# Patient Record
Sex: FEMALE | Race: BLACK OR AFRICAN AMERICAN | Employment: FULL TIME | URBAN - METROPOLITAN AREA
[De-identification: names, ages, dates, MRNs, and addresses within clinical notes are randomized per-mention and may not be internally consistent; named-entity substitution may affect disease eponyms.]

---

## 2017-03-27 ENCOUNTER — OFFICE VISIT (OUTPATIENT)
Dept: PRIMARY CARE CLINIC | Age: 57
End: 2017-03-27

## 2017-03-27 VITALS
SYSTOLIC BLOOD PRESSURE: 132 MMHG | TEMPERATURE: 97.4 F | HEIGHT: 65 IN | WEIGHT: 245 LBS | DIASTOLIC BLOOD PRESSURE: 76 MMHG | RESPIRATION RATE: 17 BRPM | HEART RATE: 78 BPM | BODY MASS INDEX: 40.82 KG/M2

## 2017-03-27 DIAGNOSIS — L30.9 DERMATITIS: ICD-10-CM

## 2017-03-27 DIAGNOSIS — Z01.818 PRE-OP EXAM: ICD-10-CM

## 2017-03-27 DIAGNOSIS — M25.561 ACUTE PAIN OF RIGHT KNEE: Primary | ICD-10-CM

## 2017-03-27 PROBLEM — S83.249A ACUTE MEDIAL MENISCAL TEAR: Status: ACTIVE | Noted: 2017-03-21

## 2017-03-27 PROCEDURE — 99244 OFF/OP CNSLTJ NEW/EST MOD 40: CPT | Performed by: FAMILY MEDICINE

## 2017-03-27 PROCEDURE — 93000 ELECTROCARDIOGRAM COMPLETE: CPT | Performed by: FAMILY MEDICINE

## 2017-03-27 RX ORDER — TRIAMCINOLONE ACETONIDE 0.25 MG/G
OINTMENT TOPICAL
Qty: 15 G | Refills: 1 | Status: SHIPPED | OUTPATIENT
Start: 2017-03-27 | End: 2017-04-03

## 2017-03-27 ASSESSMENT — PATIENT HEALTH QUESTIONNAIRE - PHQ9
2. FEELING DOWN, DEPRESSED OR HOPELESS: 0
SUM OF ALL RESPONSES TO PHQ QUESTIONS 1-9: 0
SUM OF ALL RESPONSES TO PHQ9 QUESTIONS 1 & 2: 0
1. LITTLE INTEREST OR PLEASURE IN DOING THINGS: 0

## 2017-05-04 ENCOUNTER — TELEPHONE (OUTPATIENT)
Dept: OTHER | Facility: CLINIC | Age: 57
End: 2017-05-04

## 2017-06-07 ENCOUNTER — OFFICE VISIT (OUTPATIENT)
Dept: PRIMARY CARE CLINIC | Age: 57
End: 2017-06-07

## 2017-06-07 VITALS
DIASTOLIC BLOOD PRESSURE: 75 MMHG | HEART RATE: 76 BPM | TEMPERATURE: 97.8 F | SYSTOLIC BLOOD PRESSURE: 118 MMHG | HEIGHT: 65 IN | RESPIRATION RATE: 16 BRPM | WEIGHT: 255 LBS | OXYGEN SATURATION: 97 % | BODY MASS INDEX: 42.49 KG/M2

## 2017-06-07 DIAGNOSIS — T30.0 BURN: ICD-10-CM

## 2017-06-07 DIAGNOSIS — Z79.899 ON POTASSIUM WASTING DIURETIC THERAPY: ICD-10-CM

## 2017-06-07 DIAGNOSIS — R25.2 MUSCLE CRAMPS: ICD-10-CM

## 2017-06-07 DIAGNOSIS — R25.2 MUSCLE CRAMPS: Primary | ICD-10-CM

## 2017-06-07 DIAGNOSIS — Z86.32 HISTORY OF GESTATIONAL DIABETES: ICD-10-CM

## 2017-06-07 DIAGNOSIS — E55.9 VITAMIN D DEFICIENCY: ICD-10-CM

## 2017-06-07 DIAGNOSIS — O24.419 GESTATIONAL DIABETES MELLITUS (GDM), ANTEPARTUM, GESTATIONAL DIABETES METHOD OF CONTROL UNSPECIFIED: ICD-10-CM

## 2017-06-07 PROCEDURE — 99214 OFFICE O/P EST MOD 30 MIN: CPT | Performed by: INTERNAL MEDICINE

## 2017-06-07 RX ORDER — BACLOFEN 10 MG/1
TABLET ORAL
Qty: 60 TABLET | Refills: 1 | Status: SHIPPED | OUTPATIENT
Start: 2017-06-07 | End: 2018-01-09

## 2017-06-07 ASSESSMENT — ENCOUNTER SYMPTOMS
GASTROINTESTINAL NEGATIVE: 1
RESPIRATORY NEGATIVE: 1
CHANGE IN BOWEL HABIT: 0
ABDOMINAL PAIN: 0
VOMITING: 0
SORE THROAT: 0
VISUAL CHANGE: 0
NAUSEA: 0
EYES NEGATIVE: 1

## 2017-06-08 LAB
A/G RATIO: 1.3 (ref 1.1–2.2)
ALBUMIN SERPL-MCNC: 4.4 G/DL (ref 3.4–5)
ALP BLD-CCNC: 91 U/L (ref 40–129)
ALT SERPL-CCNC: 22 U/L (ref 10–40)
ANION GAP SERPL CALCULATED.3IONS-SCNC: 19 MMOL/L (ref 3–16)
AST SERPL-CCNC: 23 U/L (ref 15–37)
BASOPHILS ABSOLUTE: 0.1 K/UL (ref 0–0.2)
BASOPHILS RELATIVE PERCENT: 0.8 %
BILIRUB SERPL-MCNC: 0.4 MG/DL (ref 0–1)
BUN BLDV-MCNC: 21 MG/DL (ref 7–20)
CALCIUM SERPL-MCNC: 9.5 MG/DL (ref 8.3–10.6)
CHLORIDE BLD-SCNC: 97 MMOL/L (ref 99–110)
CO2: 24 MMOL/L (ref 21–32)
CREAT SERPL-MCNC: 0.9 MG/DL (ref 0.6–1.1)
EOSINOPHILS ABSOLUTE: 0.2 K/UL (ref 0–0.6)
EOSINOPHILS RELATIVE PERCENT: 3.1 %
ESTIMATED AVERAGE GLUCOSE: 125.5 MG/DL
GFR AFRICAN AMERICAN: >60
GFR NON-AFRICAN AMERICAN: >60
GLOBULIN: 3.5 G/DL
GLUCOSE BLD-MCNC: 80 MG/DL (ref 70–99)
HBA1C MFR BLD: 6 %
HCT VFR BLD CALC: 42.5 % (ref 36–48)
HEMOGLOBIN: 13.6 G/DL (ref 12–16)
LYMPHOCYTES ABSOLUTE: 1.7 K/UL (ref 1–5.1)
LYMPHOCYTES RELATIVE PERCENT: 25.5 %
MAGNESIUM: 2 MG/DL (ref 1.8–2.4)
MCH RBC QN AUTO: 28.1 PG (ref 26–34)
MCHC RBC AUTO-ENTMCNC: 32 G/DL (ref 31–36)
MCV RBC AUTO: 87.8 FL (ref 80–100)
MONOCYTES ABSOLUTE: 0.7 K/UL (ref 0–1.3)
MONOCYTES RELATIVE PERCENT: 9.8 %
NEUTROPHILS ABSOLUTE: 4 K/UL (ref 1.7–7.7)
NEUTROPHILS RELATIVE PERCENT: 60.8 %
PDW BLD-RTO: 14.3 % (ref 12.4–15.4)
PLATELET # BLD: 230 K/UL (ref 135–450)
PMV BLD AUTO: 9.6 FL (ref 5–10.5)
POTASSIUM SERPL-SCNC: 4 MMOL/L (ref 3.5–5.1)
RBC # BLD: 4.84 M/UL (ref 4–5.2)
SODIUM BLD-SCNC: 140 MMOL/L (ref 136–145)
TOTAL PROTEIN: 7.9 G/DL (ref 6.4–8.2)
WBC # BLD: 6.6 K/UL (ref 4–11)

## 2017-06-10 LAB
VITAMIN D2 AND D3, TOTAL: 41.3 NG/ML (ref 30–80)
VITAMIN D2, 25 HYDROXY: <1 NG/ML
VITAMIN D3,25 HYDROXY: 41.3 NG/ML

## 2017-08-30 DIAGNOSIS — I10 ESSENTIAL HYPERTENSION: ICD-10-CM

## 2017-08-31 RX ORDER — LOSARTAN POTASSIUM AND HYDROCHLOROTHIAZIDE 25; 100 MG/1; MG/1
1 TABLET ORAL DAILY
Qty: 30 TABLET | Refills: 5 | OUTPATIENT
Start: 2017-08-31

## 2017-09-01 ENCOUNTER — OFFICE VISIT (OUTPATIENT)
Dept: PRIMARY CARE CLINIC | Age: 57
End: 2017-09-01

## 2017-09-01 VITALS
HEIGHT: 65 IN | RESPIRATION RATE: 18 BRPM | SYSTOLIC BLOOD PRESSURE: 135 MMHG | DIASTOLIC BLOOD PRESSURE: 81 MMHG | HEART RATE: 67 BPM | TEMPERATURE: 98.8 F | BODY MASS INDEX: 41.15 KG/M2 | WEIGHT: 247 LBS

## 2017-09-01 DIAGNOSIS — E55.9 VITAMIN D DEFICIENCY: ICD-10-CM

## 2017-09-01 DIAGNOSIS — Z12.11 SCREENING FOR COLON CANCER: ICD-10-CM

## 2017-09-01 DIAGNOSIS — R05.9 COUGH: ICD-10-CM

## 2017-09-01 DIAGNOSIS — K21.9 GASTROESOPHAGEAL REFLUX DISEASE WITHOUT ESOPHAGITIS: ICD-10-CM

## 2017-09-01 DIAGNOSIS — I10 ESSENTIAL HYPERTENSION: Primary | ICD-10-CM

## 2017-09-01 DIAGNOSIS — E66.01 MORBID OBESITY DUE TO EXCESS CALORIES (HCC): ICD-10-CM

## 2017-09-01 DIAGNOSIS — L85.3 XEROSIS CUTIS: ICD-10-CM

## 2017-09-01 PROCEDURE — 99214 OFFICE O/P EST MOD 30 MIN: CPT | Performed by: FAMILY MEDICINE

## 2017-09-01 RX ORDER — AMMONIUM LACTATE 12 G/100G
LOTION TOPICAL
Qty: 1 BOTTLE | Refills: 5 | Status: SHIPPED | OUTPATIENT
Start: 2017-09-01 | End: 2018-03-08 | Stop reason: SDUPTHER

## 2017-09-01 RX ORDER — LOSARTAN POTASSIUM AND HYDROCHLOROTHIAZIDE 25; 100 MG/1; MG/1
1 TABLET ORAL DAILY
Qty: 30 TABLET | Refills: 5 | Status: SHIPPED | OUTPATIENT
Start: 2017-09-01 | End: 2018-03-08 | Stop reason: SDUPTHER

## 2017-09-01 ASSESSMENT — ENCOUNTER SYMPTOMS: COUGH: 1

## 2017-10-31 ENCOUNTER — TELEPHONE (OUTPATIENT)
Dept: OTHER | Facility: CLINIC | Age: 57
End: 2017-10-31

## 2017-12-15 ENCOUNTER — TELEPHONE (OUTPATIENT)
Dept: PRIMARY CARE CLINIC | Age: 57
End: 2017-12-15

## 2017-12-18 ENCOUNTER — OFFICE VISIT (OUTPATIENT)
Dept: PRIMARY CARE CLINIC | Age: 57
End: 2017-12-18

## 2017-12-18 VITALS
HEIGHT: 63 IN | WEIGHT: 249 LBS | TEMPERATURE: 98.8 F | DIASTOLIC BLOOD PRESSURE: 94 MMHG | HEART RATE: 70 BPM | SYSTOLIC BLOOD PRESSURE: 166 MMHG | BODY MASS INDEX: 44.12 KG/M2 | OXYGEN SATURATION: 97 %

## 2017-12-18 DIAGNOSIS — Z23 NEEDS FLU SHOT: ICD-10-CM

## 2017-12-18 DIAGNOSIS — I10 ESSENTIAL HYPERTENSION: ICD-10-CM

## 2017-12-18 DIAGNOSIS — Z12.4 CERVICAL CANCER SCREENING: ICD-10-CM

## 2017-12-18 DIAGNOSIS — Z00.00 ANNUAL PHYSICAL EXAM: Primary | ICD-10-CM

## 2017-12-18 DIAGNOSIS — Z01.00 EYE EXAM, ROUTINE: ICD-10-CM

## 2017-12-18 DIAGNOSIS — Z12.39 BREAST CANCER SCREENING: ICD-10-CM

## 2017-12-18 DIAGNOSIS — Z13.220 LIPID SCREENING: ICD-10-CM

## 2017-12-18 DIAGNOSIS — Z12.11 COLON CANCER SCREENING: ICD-10-CM

## 2017-12-18 PROCEDURE — 99396 PREV VISIT EST AGE 40-64: CPT | Performed by: INTERNAL MEDICINE

## 2017-12-18 PROCEDURE — 90471 IMMUNIZATION ADMIN: CPT | Performed by: INTERNAL MEDICINE

## 2017-12-18 PROCEDURE — 90630 INFLUENZA, QUADV, 18-64 YRS, ID, PF, MICRO INJ, 0.1ML (FLUZONE QUADV, PF): CPT | Performed by: INTERNAL MEDICINE

## 2017-12-18 ASSESSMENT — ENCOUNTER SYMPTOMS
ABDOMINAL PAIN: 0
SHORTNESS OF BREATH: 0
COUGH: 0
VOMITING: 0
CONSTIPATION: 0
NAUSEA: 0
DIARRHEA: 0

## 2017-12-18 NOTE — PROGRESS NOTES
Chief Complaint:   Chief Complaint   Patient presents with    Annual Exam         HPI:  Janice Curtis is a 56 y.o. female, with a history of GERD, hypertension, and morbid obesity, who presents for a physical examination. Hypertension:  Pt's blood pressure is elevated today. Her high blood pressure appears to be inconsistent with past visits. The pt attributes her high blood pressures today to job related stress and other issues. She says she has been compliant with her medications. The pt says she avoids salty foods. She denies any current headaches or chest pain. Job Physical:  Ms. Celeste Uribe needs to have wellness paperwork completed for her job insurance plan. She needs cholesterol levels check for form completion. Preventative Health Screenings:   Colonoscopy:  Needs, has never completed   Cervical cancer screening: needs updated   Mammogram:  Needs updated   Eye examination: needs updated     Past Medical History:   Diagnosis Date    GERD (gastroesophageal reflux disease)     Gestational diabetes     Hypertension     Morbid obesity due to excess calories ([facility] [address] 2016    Vitamin D deficiency     Xerosis cutis        Past Surgical History:   Procedure Laterality Date     SECTION            No family history on file. Allergies   Allergen Reactions    Chloroquine     Lisinopril      Coughing and choking    Mefloquine     Quinolones        Social History     Social History    Marital status: Unknown     Spouse name: N/A    Number of children: N/A    Years of education: N/A     Occupational History    Not on file.      Social History Main Topics    Smoking status: Never Smoker    Smokeless tobacco: Never Used    Alcohol use No    Drug use: No    Sexual activity: Yes     Partners: Male     Other Topics Concern    Not on file     Social History Narrative    No narrative on file       Current Outpatient Prescriptions   Medication Sig Dispense Refill    ammonium lactate (LAC-HYDRIN) 12 % lotion Apply topically daily. 1 Bottle 5    losartan-hydrochlorothiazide (HYZAAR) 100-25 MG per tablet Take 1 tablet by mouth daily 30 tablet 5    silver sulfADIAZINE (SILVADENE) 1 % cream Apply topically daily. 100 g 1    baclofen (LIORESAL) 10 MG tablet One tablet twice a day as needed for muscle cramps. May cause drowsiness. 60 tablet 1    omeprazole 20 MG EC tablet Take 1 tablet by mouth daily 30 tablet 5    L-methylfolate-B6-B12 (METANX) 3-35-2 MG tablet Take 1 tablet by mouth daily 60 tablet 5    Cholecalciferol (MAXIMUM D3) 64905 UNITS CAPS Take  by mouth.  naproxen sodium (ANAPROX DS) 550 MG tablet Take 1 tablet by mouth 2 times daily (with meals) for 14 doses. 14 tablet 0     No current facility-administered medications for this visit. Review of Systems   Constitutional: Negative for chills, fatigue and fever. HENT: Negative for congestion. Eyes: Negative for visual disturbance. Respiratory: Negative for cough and shortness of breath. Cardiovascular: Negative for chest pain and leg swelling. Gastrointestinal: Negative for abdominal pain, constipation, diarrhea, nausea and vomiting. Musculoskeletal: Negative for gait problem. Neurological: Negative for dizziness, light-headedness and headaches. Psychiatric/Behavioral: Negative for decreased concentration and dysphoric mood. The patient is not nervous/anxious. BP (!) 166/94   Pulse 70   Temp 98.8 °F (37.1 °C)   Ht 5' 3\" (1.6 m)   Wt 249 lb (112.9 kg)   LMP 10/10/2014   SpO2 97%   BMI 44.11 kg/m²     Physical Exam   Constitutional: She is oriented to person, place, and time. She appears well-developed and well-nourished. No distress. HENT:   Head: Normocephalic and atraumatic. Nose: Nose normal.   Mouth/Throat: No oropharyngeal exudate. Eyes: Conjunctivae and EOM are normal. Pupils are equal, round, and reactive to light. Right eye exhibits no discharge.  Left eye exhibits no discharge. Neck: Normal range of motion. Neck supple. Cardiovascular: Normal rate, regular rhythm and normal heart sounds. Exam reveals no gallop and no friction rub. No murmur heard. Pulmonary/Chest: Effort normal and breath sounds normal. No respiratory distress. She has no wheezes. Abdominal: Soft. Bowel sounds are normal. She exhibits no distension. There is no tenderness. There is no rebound. Musculoskeletal: Normal range of motion. She exhibits no edema or tenderness. Lymphadenopathy:     She has no cervical adenopathy. Neurological: She is alert and oriented to person, place, and time. She has normal reflexes. No cranial nerve deficit. Skin: Skin is warm and dry. No rash noted. She is not diaphoretic. No erythema. Psychiatric: She has a normal mood and affect. Her behavior is normal.   Vitals reviewed. Assessment:  Benjamin Nuno is a 56 y.o. female, with a history of GERD, hypertension, and morbid obesity, who presents for a physical examination. Plan:       1. Annual physical exam  -will complete job paperwork after pt completes lipid panel testing     2. Essential hypertension:  Goal BP less than 140/90. Pt's blood pressure is high today. She attributes this to current stressors, but has been compliant with her medication. Based on past BP measurements, this does appear to be an outlier.    -continue current regimen:  HCTZ 25 mg Qday and Losartan 100 mg Qday  -return in 1 month for blood pressure recheck to see if additional antihypertensive medications are needed. -recommend low sodium diet and exercise changes     3. Lipid screening    - Lipid Panel; Future    4. Needs flu shot    -completed at today's visit:  Ramonita Rodriguez, 18-64 YRS, ID, PF, MICRO INJ, 0.1ML (FLUZONE QUADV, PF)    5. Eye exam, routine    - [facility] [place],  ([place])    6. Breast cancer screening    - Mammography Screening    7.  Colon cancer screening    - [facility] Karissa Mesa MD ([place])    8. Cervical cancer screening    - Payton Yost MD        Return in about 1 month (around 1/18/2018) for hypertension with Dr. Pasquale Glass .

## 2017-12-21 DIAGNOSIS — Z13.220 LIPID SCREENING: ICD-10-CM

## 2017-12-21 LAB
CHOLESTEROL, TOTAL: 180 MG/DL (ref 0–199)
HDLC SERPL-MCNC: 56 MG/DL (ref 40–60)
LDL CHOLESTEROL CALCULATED: 110 MG/DL
TRIGL SERPL-MCNC: 72 MG/DL (ref 0–150)
VLDLC SERPL CALC-MCNC: 14 MG/DL

## 2017-12-24 DIAGNOSIS — E78.00 PURE HYPERCHOLESTEROLEMIA: Primary | ICD-10-CM

## 2017-12-24 RX ORDER — ATORVASTATIN CALCIUM 20 MG/1
20 TABLET, FILM COATED ORAL DAILY
Qty: 30 TABLET | Refills: 3 | Status: SHIPPED | OUTPATIENT
Start: 2017-12-24 | End: 2018-03-08

## 2018-01-09 ENCOUNTER — OFFICE VISIT (OUTPATIENT)
Dept: PRIMARY CARE CLINIC | Age: 58
End: 2018-01-09

## 2018-01-09 VITALS
TEMPERATURE: 97.2 F | WEIGHT: 248 LBS | OXYGEN SATURATION: 98 % | BODY MASS INDEX: 43.94 KG/M2 | DIASTOLIC BLOOD PRESSURE: 82 MMHG | HEART RATE: 78 BPM | HEIGHT: 63 IN | SYSTOLIC BLOOD PRESSURE: 134 MMHG

## 2018-01-09 DIAGNOSIS — Z01.810 PREOPERATIVE CARDIOVASCULAR EXAMINATION: ICD-10-CM

## 2018-01-09 DIAGNOSIS — Z01.810 PREOPERATIVE CARDIOVASCULAR EXAMINATION: Primary | ICD-10-CM

## 2018-01-09 LAB
ALBUMIN SERPL-MCNC: 4.3 G/DL (ref 3.4–5)
ANION GAP SERPL CALCULATED.3IONS-SCNC: 15 MMOL/L (ref 3–16)
BASOPHILS ABSOLUTE: 0 K/UL (ref 0–0.2)
BASOPHILS RELATIVE PERCENT: 0.6 %
BUN BLDV-MCNC: 12 MG/DL (ref 7–20)
CALCIUM SERPL-MCNC: 9.3 MG/DL (ref 8.3–10.6)
CHLORIDE BLD-SCNC: 100 MMOL/L (ref 99–110)
CO2: 26 MMOL/L (ref 21–32)
CREAT SERPL-MCNC: 0.7 MG/DL (ref 0.6–1.1)
EOSINOPHILS ABSOLUTE: 0.1 K/UL (ref 0–0.6)
EOSINOPHILS RELATIVE PERCENT: 1.9 %
GFR AFRICAN AMERICAN: >60
GFR NON-AFRICAN AMERICAN: >60
GLUCOSE BLD-MCNC: 75 MG/DL (ref 70–99)
HCT VFR BLD CALC: 39.1 % (ref 36–48)
HEMOGLOBIN: 13 G/DL (ref 12–16)
LYMPHOCYTES ABSOLUTE: 2.1 K/UL (ref 1–5.1)
LYMPHOCYTES RELATIVE PERCENT: 32.1 %
MCH RBC QN AUTO: 28.7 PG (ref 26–34)
MCHC RBC AUTO-ENTMCNC: 33.1 G/DL (ref 31–36)
MCV RBC AUTO: 86.6 FL (ref 80–100)
MONOCYTES ABSOLUTE: 0.6 K/UL (ref 0–1.3)
MONOCYTES RELATIVE PERCENT: 9.5 %
NEUTROPHILS ABSOLUTE: 3.7 K/UL (ref 1.7–7.7)
NEUTROPHILS RELATIVE PERCENT: 55.9 %
PDW BLD-RTO: 13.6 % (ref 12.4–15.4)
PHOSPHORUS: 3.6 MG/DL (ref 2.5–4.9)
PLATELET # BLD: 261 K/UL (ref 135–450)
PMV BLD AUTO: 9.1 FL (ref 5–10.5)
POTASSIUM SERPL-SCNC: 4.2 MMOL/L (ref 3.5–5.1)
RBC # BLD: 4.52 M/UL (ref 4–5.2)
SODIUM BLD-SCNC: 141 MMOL/L (ref 136–145)
TROPONIN: <0.01 NG/ML
WBC # BLD: 6.6 K/UL (ref 4–11)

## 2018-01-09 PROCEDURE — 99243 OFF/OP CNSLTJ NEW/EST LOW 30: CPT | Performed by: INTERNAL MEDICINE

## 2018-01-09 PROCEDURE — 93000 ELECTROCARDIOGRAM COMPLETE: CPT | Performed by: INTERNAL MEDICINE

## 2018-01-09 NOTE — PROGRESS NOTES
person, place, and time. No cranial nerve deficit. Skin: Skin is warm and dry. No rash noted. She is not diaphoretic. No erythema. Psychiatric: She has a normal mood and affect. Her behavior is normal.   Vitals reviewed. EKG Interpretation:  Sinus rhythm, mild ST elevation in lateral leads I and AvL , there are no previous tracings available for comparison. Pt denies chest pain or shortness of breath. Lab Results   Component Value Date    WBC 6.6 06/07/2017    HGB 13.6 06/07/2017    HCT 42.5 06/07/2017    MCV 87.8 06/07/2017     06/07/2017     Lab Results   Component Value Date    CREATININE 0.9 06/07/2017    BUN 21 (H) 06/07/2017     06/07/2017    K 4.0 06/07/2017    CL 97 (L) 06/07/2017    CO2 24 06/07/2017     No results found for: INR, PROTIME         Assessment:       56 y.o. patient with planned surgery as above. Known risk factors for perioperative complications: None  Current medications which may produce withdrawal symptoms if withheld perioperatively: none      Plan:     1. Preoperative workup as follows: ECG, hemoglobin, hematocrit, electrolytes, creatinine, glucose  2. Change in medication regimen before surgery: Discontinue NSAIDs 5 days before surgery, hold losartan morning of surgery   3. Prophylaxis for cardiac events with perioperative beta-blockers: Not indicated  ACC/AHA indications for pre-operative beta-blocker use:    · Vascular surgery with history of postitive stress test  · Intermediate or high risk surgery with history of CAD   · Intermediate or high risk surgery with multiple clinical predictors of CAD- 2 of the following: history of compensated or prior heart failure, history of cerebrovascular disease, DM, or renal insufficiency    Routine administration of higher-dose, long-acting metoprolol in beta-blockernaïve patients on the day of surgery, and in the absence of dose titration is associated with an overall increase in mortality.   Beta-blockers should be started days to weeks prior to surgery and titrated to pulse < 70.  4. Deep vein thrombosis prophylaxis: regimen to be chosen by surgical team  5.  No contraindications to planned surgery

## 2018-01-10 ASSESSMENT — ENCOUNTER SYMPTOMS
COUGH: 0
SHORTNESS OF BREATH: 0
SORE THROAT: 0
ABDOMINAL PAIN: 0

## 2018-03-08 ENCOUNTER — OFFICE VISIT (OUTPATIENT)
Dept: PRIMARY CARE CLINIC | Age: 58
End: 2018-03-08

## 2018-03-08 VITALS
HEART RATE: 68 BPM | DIASTOLIC BLOOD PRESSURE: 85 MMHG | RESPIRATION RATE: 12 BRPM | HEIGHT: 63 IN | SYSTOLIC BLOOD PRESSURE: 130 MMHG | TEMPERATURE: 97.7 F | WEIGHT: 241 LBS | BODY MASS INDEX: 42.7 KG/M2 | OXYGEN SATURATION: 93 %

## 2018-03-08 DIAGNOSIS — I10 ESSENTIAL HYPERTENSION: Primary | ICD-10-CM

## 2018-03-08 DIAGNOSIS — E66.01 MORBID OBESITY DUE TO EXCESS CALORIES (HCC): ICD-10-CM

## 2018-03-08 DIAGNOSIS — S86.001S INJURY OF RIGHT ACHILLES TENDON, SEQUELA: ICD-10-CM

## 2018-03-08 DIAGNOSIS — L85.3 XEROSIS CUTIS: ICD-10-CM

## 2018-03-08 DIAGNOSIS — E55.9 VITAMIN D DEFICIENCY: ICD-10-CM

## 2018-03-08 DIAGNOSIS — K21.9 GASTROESOPHAGEAL REFLUX DISEASE WITHOUT ESOPHAGITIS: ICD-10-CM

## 2018-03-08 PROCEDURE — 99214 OFFICE O/P EST MOD 30 MIN: CPT | Performed by: FAMILY MEDICINE

## 2018-03-08 RX ORDER — LOSARTAN POTASSIUM AND HYDROCHLOROTHIAZIDE 25; 100 MG/1; MG/1
1 TABLET ORAL DAILY
Qty: 30 TABLET | Refills: 5 | Status: SHIPPED | OUTPATIENT
Start: 2018-03-08 | End: 2018-09-27 | Stop reason: SDUPTHER

## 2018-03-08 RX ORDER — DICLOFENAC SODIUM 75 MG/1
75 TABLET, DELAYED RELEASE ORAL
COMMUNITY
Start: 2018-02-07 | End: 2019-03-05

## 2018-03-08 RX ORDER — AMMONIUM LACTATE 12 G/100G
LOTION TOPICAL
Qty: 1 BOTTLE | Refills: 5 | Status: SHIPPED | OUTPATIENT
Start: 2018-03-08 | End: 2018-12-06

## 2018-04-19 ENCOUNTER — OFFICE VISIT (OUTPATIENT)
Dept: PRIMARY CARE CLINIC | Age: 58
End: 2018-04-19

## 2018-04-19 VITALS
OXYGEN SATURATION: 97 % | DIASTOLIC BLOOD PRESSURE: 81 MMHG | HEIGHT: 63 IN | TEMPERATURE: 98.3 F | SYSTOLIC BLOOD PRESSURE: 127 MMHG | BODY MASS INDEX: 42.1 KG/M2 | HEART RATE: 66 BPM | RESPIRATION RATE: 12 BRPM | WEIGHT: 237.6 LBS

## 2018-04-19 DIAGNOSIS — M25.561 CHRONIC PAIN OF RIGHT KNEE: Primary | ICD-10-CM

## 2018-04-19 DIAGNOSIS — G89.29 CHRONIC PAIN OF RIGHT KNEE: Primary | ICD-10-CM

## 2018-04-19 DIAGNOSIS — Z01.818 PRE-OPERATIVE GENERAL PHYSICAL EXAMINATION: ICD-10-CM

## 2018-04-19 PROCEDURE — 99243 OFF/OP CNSLTJ NEW/EST LOW 30: CPT | Performed by: FAMILY MEDICINE

## 2018-04-19 ASSESSMENT — PATIENT HEALTH QUESTIONNAIRE - PHQ9
SUM OF ALL RESPONSES TO PHQ QUESTIONS 1-9: 0
2. FEELING DOWN, DEPRESSED OR HOPELESS: 0
SUM OF ALL RESPONSES TO PHQ9 QUESTIONS 1 & 2: 0
1. LITTLE INTEREST OR PLEASURE IN DOING THINGS: 0

## 2018-09-27 ENCOUNTER — OFFICE VISIT (OUTPATIENT)
Dept: PRIMARY CARE CLINIC | Age: 58
End: 2018-09-27
Payer: COMMERCIAL

## 2018-09-27 VITALS
RESPIRATION RATE: 16 BRPM | BODY MASS INDEX: 42.56 KG/M2 | TEMPERATURE: 97.1 F | SYSTOLIC BLOOD PRESSURE: 152 MMHG | WEIGHT: 240.2 LBS | HEART RATE: 70 BPM | OXYGEN SATURATION: 100 % | DIASTOLIC BLOOD PRESSURE: 94 MMHG | HEIGHT: 63 IN

## 2018-09-27 DIAGNOSIS — K21.9 GASTROESOPHAGEAL REFLUX DISEASE WITHOUT ESOPHAGITIS: ICD-10-CM

## 2018-09-27 DIAGNOSIS — H92.02 OTALGIA OF LEFT EAR: Primary | ICD-10-CM

## 2018-09-27 DIAGNOSIS — I10 ESSENTIAL HYPERTENSION: ICD-10-CM

## 2018-09-27 PROCEDURE — 99213 OFFICE O/P EST LOW 20 MIN: CPT | Performed by: FAMILY MEDICINE

## 2018-09-27 RX ORDER — LOSARTAN POTASSIUM AND HYDROCHLOROTHIAZIDE 25; 100 MG/1; MG/1
1 TABLET ORAL DAILY
Qty: 30 TABLET | Refills: 0 | Status: SHIPPED | OUTPATIENT
Start: 2018-09-27 | End: 2018-10-25 | Stop reason: SDUPTHER

## 2018-09-27 RX ORDER — ESOMEPRAZOLE MAGNESIUM 40 MG/1
40 CAPSULE, DELAYED RELEASE ORAL DAILY
Qty: 30 CAPSULE | Refills: 2 | Status: SHIPPED | OUTPATIENT
Start: 2018-09-27 | End: 2018-10-25 | Stop reason: CLARIF

## 2018-09-27 RX ORDER — GUAIFENESIN 600 MG/1
600 TABLET, EXTENDED RELEASE ORAL 2 TIMES DAILY
Qty: 60 TABLET | Refills: 0 | Status: SHIPPED | OUTPATIENT
Start: 2018-09-27 | End: 2018-12-06 | Stop reason: SDUPTHER

## 2018-09-27 NOTE — PATIENT INSTRUCTIONS
Patient Education        Earache: Care Instructions  Your Care Instructions    Even though infection is a common cause of ear pain, not all ear pain means an infection. If you have ear pain and don't have an infection, it could be because of a jaw problem, such as temporomandibular joint (TMJ) pain. Or it could be because of a neck problem. When ear discomfort or pain is mild or comes and goes without other symptoms, home treatment may be all you need. Follow-up care is a key part of your treatment and safety. Be sure to make and go to all appointments, and call your doctor if you are having problems. It's also a good idea to know your test results and keep a list of the medicines you take. How can you care for yourself at home? · Apply heat on the ear to ease pain. To apply heat, put a warm water bottle, a heating pad set on low, or a warm cloth on your ear. Do not go to sleep with a heating pad on your skin. · Take an over-the-counter pain medicine, such as acetaminophen (Tylenol), ibuprofen (Advil, Motrin), or naproxen (Aleve). Be safe with medicines. Read and follow all instructions on the label. · Do not take two or more pain medicines at the same time unless the doctor told you to. Many pain medicines have acetaminophen, which is Tylenol. Too much acetaminophen (Tylenol) can be harmful. · Never insert anything, such as a cotton swab or a claudio pin, into the ear. When should you call for help? Call your doctor now or seek immediate medical care if:    · You have new or worse symptoms of infection, such as:  ¨ Increased pain, swelling, warmth, or redness. ¨ Red streaks leading from the area. ¨ Pus draining from the area. ¨ A fever.    Watch closely for changes in your health, and be sure to contact your doctor if:    · You have new or worse discharge coming from the ear.     · You do not get better as expected. Where can you learn more? Go to [email or web address]://dianaeb.[email or web address] org and sign in to your [employer] account. Enter T536 in the [facility] box to learn more about \"Earache: Care Instructions. \"     If you do not have an account, please click on the \"Sign Up Now\" link. Current as of: May 12, 2017  Content Version: 11.7  © 9085-6787 [employer]. Care instructions adapted under license by [facility] [place] ([facility]). If you have questions about a medical condition or this instruction, always ask your healthcare professional. [address] any warranty or liability for your use of this information.

## 2018-10-25 ENCOUNTER — OFFICE VISIT (OUTPATIENT)
Dept: PRIMARY CARE CLINIC | Age: 58
End: 2018-10-25
Payer: COMMERCIAL

## 2018-10-25 VITALS
WEIGHT: 243.8 LBS | TEMPERATURE: 97.5 F | HEART RATE: 66 BPM | RESPIRATION RATE: 12 BRPM | BODY MASS INDEX: 43.2 KG/M2 | SYSTOLIC BLOOD PRESSURE: 136 MMHG | OXYGEN SATURATION: 97 % | DIASTOLIC BLOOD PRESSURE: 88 MMHG | HEIGHT: 63 IN

## 2018-10-25 DIAGNOSIS — I10 ESSENTIAL HYPERTENSION: Primary | ICD-10-CM

## 2018-10-25 DIAGNOSIS — L85.3 XEROSIS CUTIS: ICD-10-CM

## 2018-10-25 DIAGNOSIS — E66.01 MORBID OBESITY DUE TO EXCESS CALORIES (HCC): ICD-10-CM

## 2018-10-25 DIAGNOSIS — Z12.11 COLON CANCER SCREENING: ICD-10-CM

## 2018-10-25 DIAGNOSIS — Z23 FLU VACCINE NEED: ICD-10-CM

## 2018-10-25 DIAGNOSIS — E55.9 VITAMIN D DEFICIENCY: ICD-10-CM

## 2018-10-25 PROCEDURE — 90688 IIV4 VACCINE SPLT 0.5 ML IM: CPT | Performed by: FAMILY MEDICINE

## 2018-10-25 PROCEDURE — 90471 IMMUNIZATION ADMIN: CPT | Performed by: FAMILY MEDICINE

## 2018-10-25 PROCEDURE — 99214 OFFICE O/P EST MOD 30 MIN: CPT | Performed by: FAMILY MEDICINE

## 2018-10-25 RX ORDER — LOSARTAN POTASSIUM AND HYDROCHLOROTHIAZIDE 25; 100 MG/1; MG/1
1 TABLET ORAL DAILY
Qty: 30 TABLET | Refills: 0 | Status: SHIPPED | OUTPATIENT
Start: 2018-10-25 | End: 2018-12-06 | Stop reason: SDUPTHER

## 2018-10-25 NOTE — PATIENT INSTRUCTIONS
are always changing. Each year a new flu vaccine is made to protect against three or four viruses that are likely to cause disease in the upcoming flu season. But even when the vaccine doesn't exactly match these viruses, it may still provide some protection. Flu vaccine cannot prevent:  · Flu that is caused by a virus not covered by the vaccine. · Illnesses that look like flu but are not. Some people should not get this vaccine  Tell the person who is giving you the vaccine:  · If you have any severe (life-threatening) allergies. If you ever had a life-threatening allergic reaction after a dose of flu vaccine, or have a severe allergy to any part of this vaccine, you may be advised not to get vaccinated. Most, but not all, types of flu vaccine contain a small amount of egg protein. · If you ever had Guillain-Barré syndrome (also called GBS) Some people with a history of GBS should not get this vaccine. This should be discussed with your doctor. · If you are not feeling well. It is usually okay to get flu vaccine when you have a mild illness, but you might be asked to come back when you feel better. Risks of a vaccine reaction  With any medicine, including vaccines, there is a chance of reactions. These are usually mild and go away on their own, but serious reactions are also possible. Most people who get a flu shot do not have any problems with it. Minor problems following a flu shot include:  · Soreness, redness, or swelling where the shot was given  · Hoarseness  · Sore, red or itchy eyes  · Cough  · Fever  · Aches  · Headache  · Itching  · Fatigue  If these problems occur, they usually begin soon after the shot and last 1 or 2 days. More serious problems following a flu shot can include the following:  · There may be a small increased risk of Guillain-Barré Syndrome (GBS) after inactivated flu vaccine. This risk has been estimated at 1 or 2 additional cases per million people vaccinated.  This is much Your doctor should file this report, or you can do it yourself through the VAERS website at www.vaers. [facility].gov, or by calling 8-961.770.3666. [facility] does not give medical advice. The National Vaccine Injury Compensation Program  The National Vaccine Injury Compensation Program (VICP) is a federal program that was created to compensate people who may have been injured by certain vaccines. Persons who believe they may have been injured by a vaccine can learn about the program and about filing a claim by calling 0-692.287.6037 or visiting the [employer] website at www.[facility].gov/vaccinecompensation. There is a time limit to file a claim for compensation. How can I learn more? · Ask your healthcare provider. He or she can give you the vaccine package insert or suggest other sources of information. · Call your local or state health department. · Contact the Centers for Disease Control and Prevention (CDC):  ¨ Call 8-756.215.8887 (1-800-CDC-INFO) or  ¨ Visit CDC's website at www.cdc.gov/flu  Vaccine Information Statement  Inactivated Influenza Vaccine  8/7/2015)  42 FLORENT[address]anda Bill Ray 278GU-55  Department of Health and Human Services  Centers for Disease Control and Prevention  Many Vaccine Information Statements are available in [place]ish and other languages. See www.immunize.org/vis. Muchas hojas de información sobre vacunas están disponibles en español y en otros idiomas. Visite www.immunize.org/vis. Care instructions adapted under license by [facility] ([facility]). If you have questions about a medical condition or this instruction, always ask your healthcare professional. [address] any warranty or liability for your use of this information.

## 2018-12-06 ENCOUNTER — OFFICE VISIT (OUTPATIENT)
Dept: PRIMARY CARE CLINIC | Age: 58
End: 2018-12-06
Payer: COMMERCIAL

## 2018-12-06 VITALS
OXYGEN SATURATION: 98 % | DIASTOLIC BLOOD PRESSURE: 78 MMHG | HEIGHT: 63 IN | TEMPERATURE: 97.7 F | SYSTOLIC BLOOD PRESSURE: 133 MMHG | RESPIRATION RATE: 12 BRPM | BODY MASS INDEX: 43.27 KG/M2 | HEART RATE: 64 BPM | WEIGHT: 244.2 LBS

## 2018-12-06 DIAGNOSIS — G89.29 CHRONIC PAIN OF RIGHT ANKLE: ICD-10-CM

## 2018-12-06 DIAGNOSIS — Z91.09 ENVIRONMENTAL ALLERGIES: ICD-10-CM

## 2018-12-06 DIAGNOSIS — M25.571 CHRONIC PAIN OF RIGHT ANKLE: ICD-10-CM

## 2018-12-06 DIAGNOSIS — I10 ESSENTIAL HYPERTENSION: Primary | ICD-10-CM

## 2018-12-06 DIAGNOSIS — L85.3 XEROSIS CUTIS: ICD-10-CM

## 2018-12-06 DIAGNOSIS — E55.9 VITAMIN D DEFICIENCY: ICD-10-CM

## 2018-12-06 DIAGNOSIS — E66.01 MORBID OBESITY DUE TO EXCESS CALORIES (HCC): ICD-10-CM

## 2018-12-06 DIAGNOSIS — Z01.419 GYNECOLOGIC EXAM NORMAL: ICD-10-CM

## 2018-12-06 DIAGNOSIS — K21.9 GASTROESOPHAGEAL REFLUX DISEASE WITHOUT ESOPHAGITIS: ICD-10-CM

## 2018-12-06 PROCEDURE — 99214 OFFICE O/P EST MOD 30 MIN: CPT | Performed by: FAMILY MEDICINE

## 2018-12-06 RX ORDER — LOSARTAN POTASSIUM AND HYDROCHLOROTHIAZIDE 25; 100 MG/1; MG/1
1 TABLET ORAL DAILY
Qty: 30 TABLET | Refills: 0 | Status: SHIPPED | OUTPATIENT
Start: 2018-12-06 | End: 2018-12-06 | Stop reason: SDUPTHER

## 2018-12-06 RX ORDER — GUAIFENESIN 600 MG/1
600 TABLET, EXTENDED RELEASE ORAL 2 TIMES DAILY
Qty: 60 TABLET | Refills: 3 | Status: SHIPPED | OUTPATIENT
Start: 2018-12-06 | End: 2019-03-05 | Stop reason: ALTCHOICE

## 2018-12-06 RX ORDER — GUAIFENESIN 600 MG/1
600 TABLET, EXTENDED RELEASE ORAL 2 TIMES DAILY
Qty: 60 TABLET | Refills: 0 | Status: SHIPPED | OUTPATIENT
Start: 2018-12-06 | End: 2018-12-06 | Stop reason: SDUPTHER

## 2018-12-06 RX ORDER — LOSARTAN POTASSIUM AND HYDROCHLOROTHIAZIDE 25; 100 MG/1; MG/1
1 TABLET ORAL DAILY
Qty: 30 TABLET | Refills: 3 | Status: SHIPPED | OUTPATIENT
Start: 2018-12-06 | End: 2019-03-05 | Stop reason: ALTCHOICE

## 2018-12-06 NOTE — PROGRESS NOTES
oriented to person, place, and time. Skin: Skin is warm and dry. Psychiatric: She has a normal mood and affect. Her behavior is normal.   Vitals reviewed. Assessment / Plan:      Юлия was seen today for gynecologic exam.    Diagnoses and all orders for this visit:    Essential hypertension-controlled  -     losartan-hydrochlorothiazide (HYZAAR) 100-25 MG per tablet; Take 1 tablet by mouth daily    Gynecologic exam normal  -     PAP SMEAR-pending    Vitamin D deficiency-controlled on supplement    Morbid obesity due to excess calories (HCC)-Encourage healthy diet, weight loss and exercise; discussed    Xerosis cutis-stopped lachydrin as it was not helping    Environmental allergies-controlled  -     guaiFENesin (MUCINEX) 600 MG extended release tablet;  Take 1 tablet by mouth 2 times daily    Chronic pain of right ankle  -     Chiquita Agarwal Chi, MD, Orthopedic Surgery (Foot, Ankle), [facility]

## 2018-12-06 NOTE — PATIENT INSTRUCTIONS
smoke or have diabetes to show what your risk for a heart attack or stroke is over the next 10 years. When should you call for help? Watch closely for changes in your health, and be sure to contact your doctor if you have any problems or symptoms that concern you. Where can you learn more? Go to [email or web address] and sign in to your [employer] account. Enter O938 in the [facility] box to learn more about \"Well Visit, Women 50 to 72: Care Instructions. \"     If you do not have an account, please click on the \"Sign Up Now\" link. Current as of: March 29, 2018  Content Version: 11.8  © 0697-2486 [employer]. Care instructions adapted under license by [facility] ([facility]). If you have questions about a medical condition or this instruction, always ask your healthcare professional. [place] [address] any warranty or liability for your use of this information. Patient Education        Learning About Cervical Cancer Screening  What is a cervical cancer screening test?    Cervical cancer screening tests check for cancer of the cervix. The cervix is the lower part of the uterus that opens into the vagina. The test can help your doctor find early changes in the cells that could lead to cancer. Two tests can be used to screen for cervical cancer. They may be used alone or together. A Pap test.   This test looks for changes in the cells of the cervix. Abnormal cells may be a sign of cancer. A human papillomavirus (HPV) test.   This test looks for the HPV virus. Some types of HPV can cause cancer. During either test, the doctor or nurse will insert a tool called a speculum into your vagina. The speculum gently spreads apart the vaginal walls. It allows your doctor to see inside the vagina and the cervix. He or she uses a small swab or brush to collect cell samples from your cervix. Try to schedule the test when you're not having your period.  To get ready for the test, avoid douches, tampons, vaginal medicines, sprays, and powders for at least a day before you have the test.  When should you have a screening test?  These guidelines apply to women who are at average risk of cervical cancer. If you don't know your risk, talk with your doctor. Women 21 to 34  · You can start having a Pap test at age 24. It is done every 3 years. · You can start having the primary HPV test at age 22. It is done every 3 years. Women 30 to 59  · If you had both a Pap test and an HPV test last time and both were normal, you can have a Pap plus an HPV test every 5 years. · If you had only a Pap test last time, as long as your results are normal, you can have a Pap test every 3 years. · If you had only an HPV test last time, as long as your results are normal, you can have an HPV test every 3 years. Women 72 and older  · If you are age 72 or older, talk with your doctor about what's right for you. Women ages 72 and older may no longer need to be screened for cervical cancer. When to stop having screening tests depends on your medical history, your overall health, and your risk of cervical cell changes or cervical cancer. What happens after the test?  The sample of cells taken during your test will be sent to a lab so that an expert can look at the cells. It usually takes a week or two to get the results back. Pap tests  · A normal result means that the test didn't find any abnormal cells in the sample. · An abnormal result can mean many things. Most of these aren't cancer. The results of your test may be abnormal because:  ? You have an infection of the vagina or cervix, such as a yeast infection. ? You have an IUD (intrauterine device for birth control). ? You have low estrogen levels after menopause that are causing the cells to change. ? You have cell changes that may be a sign of precancer or cancer. The results are ranked based on how serious the changes might be.   HPV tests  · A

## 2018-12-10 ENCOUNTER — TELEPHONE (OUTPATIENT)
Dept: PRIMARY CARE CLINIC | Age: 58
End: 2018-12-10

## 2018-12-11 ENCOUNTER — TELEPHONE (OUTPATIENT)
Dept: PRIMARY CARE CLINIC | Age: 58
End: 2018-12-11

## 2019-01-03 ENCOUNTER — TELEPHONE (OUTPATIENT)
Dept: PRIMARY CARE CLINIC | Age: 59
End: 2019-01-03

## 2019-01-04 DIAGNOSIS — I10 ESSENTIAL HYPERTENSION: Primary | ICD-10-CM

## 2019-01-04 RX ORDER — LOSARTAN POTASSIUM 100 MG/1
100 TABLET ORAL DAILY
Qty: 90 TABLET | Refills: 0 | Status: SHIPPED | OUTPATIENT
Start: 2019-01-04 | End: 2019-02-13

## 2019-01-04 RX ORDER — HYDROCHLOROTHIAZIDE 25 MG/1
25 TABLET ORAL EVERY MORNING
Qty: 90 TABLET | Refills: 0 | Status: SHIPPED | OUTPATIENT
Start: 2019-01-04 | End: 2019-03-05 | Stop reason: SDUPTHER

## 2019-01-18 ENCOUNTER — OFFICE VISIT (OUTPATIENT)
Dept: ORTHOPEDIC SURGERY | Age: 59
End: 2019-01-18
Payer: COMMERCIAL

## 2019-01-18 VITALS
HEIGHT: 63 IN | BODY MASS INDEX: 43.05 KG/M2 | HEART RATE: 75 BPM | SYSTOLIC BLOOD PRESSURE: 130 MMHG | WEIGHT: 243 LBS | DIASTOLIC BLOOD PRESSURE: 73 MMHG | RESPIRATION RATE: 16 BRPM

## 2019-01-18 DIAGNOSIS — M92.61 HAGLUND'S DEFORMITY OF RIGHT HEEL: ICD-10-CM

## 2019-01-18 DIAGNOSIS — M25.571 RIGHT ANKLE PAIN, UNSPECIFIED CHRONICITY: ICD-10-CM

## 2019-01-18 DIAGNOSIS — M65.28 CALCIFIC ACHILLES TENDINITIS OF RIGHT LOWER EXTREMITY: Primary | ICD-10-CM

## 2019-01-18 PROCEDURE — 99243 OFF/OP CNSLTJ NEW/EST LOW 30: CPT | Performed by: ORTHOPAEDIC SURGERY

## 2019-01-18 RX ORDER — DEXAMETHASONE SODIUM PHOSPHATE 4 MG/ML
INJECTION, SOLUTION INTRA-ARTICULAR; INTRALESIONAL; INTRAMUSCULAR; INTRAVENOUS; SOFT TISSUE
Qty: 30 ML | Refills: 0 | Status: SHIPPED | OUTPATIENT
Start: 2019-01-18 | End: 2019-03-05

## 2019-01-18 RX ORDER — NAPROXEN 500 MG/1
500 TABLET ORAL 2 TIMES DAILY WITH MEALS
Qty: 60 TABLET | Refills: 0 | Status: SHIPPED | OUTPATIENT
Start: 2019-01-18 | End: 2019-02-13 | Stop reason: SDUPTHER

## 2019-01-19 PROBLEM — M76.61 CALCIFIC ACHILLES TENDINITIS OF RIGHT LOWER EXTREMITY: Status: ACTIVE | Noted: 2019-01-19

## 2019-01-19 PROBLEM — M65.28 CALCIFIC ACHILLES TENDINITIS OF RIGHT LOWER EXTREMITY: Status: ACTIVE | Noted: 2019-01-19

## 2019-01-19 PROBLEM — M92.61 HAGLUND'S DEFORMITY OF RIGHT HEEL: Status: ACTIVE | Noted: 2019-01-19

## 2019-01-23 ENCOUNTER — HOSPITAL ENCOUNTER (EMERGENCY)
Age: 59
Discharge: HOME | End: 2019-01-23
Attending: EMERGENCY MEDICINE
Payer: COMMERCIAL

## 2019-01-23 ENCOUNTER — APPOINTMENT (OUTPATIENT)
Dept: GENERAL RADIOLOGY | Age: 59
End: 2019-01-23
Payer: COMMERCIAL

## 2019-01-23 VITALS
DIASTOLIC BLOOD PRESSURE: 60 MMHG | HEART RATE: 72 BPM | TEMPERATURE: 98.4 F | RESPIRATION RATE: 18 BRPM | WEIGHT: 242 LBS | OXYGEN SATURATION: 97 % | BODY MASS INDEX: 41.32 KG/M2 | SYSTOLIC BLOOD PRESSURE: 148 MMHG | HEIGHT: 64 IN

## 2019-01-23 DIAGNOSIS — S90.111A CONTUSION OF RIGHT GREAT TOE WITHOUT DAMAGE TO NAIL, INITIAL ENCOUNTER: Primary | ICD-10-CM

## 2019-01-23 DIAGNOSIS — S90.31XA CONTUSION OF RIGHT FOOT, INITIAL ENCOUNTER: ICD-10-CM

## 2019-01-23 PROCEDURE — 6370000000 HC RX 637 (ALT 250 FOR IP): Performed by: EMERGENCY MEDICINE

## 2019-01-23 PROCEDURE — 73630 X-RAY EXAM OF FOOT: CPT

## 2019-01-23 PROCEDURE — L4387 NON-PNEUM WALK BOOT PRE OTS: HCPCS

## 2019-01-23 PROCEDURE — 99283 EMERGENCY DEPT VISIT LOW MDM: CPT

## 2019-01-23 RX ORDER — IBUPROFEN 600 MG/1
600 TABLET ORAL EVERY 6 HOURS PRN
Qty: 30 TABLET | Refills: 0 | Status: SHIPPED | OUTPATIENT
Start: 2019-01-23 | End: 2019-03-05 | Stop reason: ALTCHOICE

## 2019-01-23 RX ORDER — IBUPROFEN 400 MG/1
800 TABLET ORAL ONCE
Status: COMPLETED | OUTPATIENT
Start: 2019-01-23 | End: 2019-01-23

## 2019-01-23 RX ADMIN — IBUPROFEN 800 MG: 400 TABLET ORAL at 17:47

## 2019-01-23 ASSESSMENT — PAIN DESCRIPTION - LOCATION
LOCATION: FOOT
LOCATION: FOOT

## 2019-01-23 ASSESSMENT — PAIN DESCRIPTION - ORIENTATION
ORIENTATION: RIGHT
ORIENTATION: RIGHT

## 2019-01-23 ASSESSMENT — PAIN DESCRIPTION - DESCRIPTORS: DESCRIPTORS: ACHING

## 2019-01-23 ASSESSMENT — PAIN SCALES - GENERAL
PAINLEVEL_OUTOF10: 4
PAINLEVEL_OUTOF10: 4
PAINLEVEL_OUTOF10: 5

## 2019-01-23 ASSESSMENT — PAIN DESCRIPTION - PAIN TYPE
TYPE: ACUTE PAIN
TYPE: ACUTE PAIN

## 2019-01-23 ASSESSMENT — PAIN - FUNCTIONAL ASSESSMENT: PAIN_FUNCTIONAL_ASSESSMENT: ADVANCED DEMENTIA

## 2019-02-13 ENCOUNTER — OFFICE VISIT (OUTPATIENT)
Dept: PRIMARY CARE CLINIC | Age: 59
End: 2019-02-13
Payer: COMMERCIAL

## 2019-02-13 VITALS
SYSTOLIC BLOOD PRESSURE: 134 MMHG | HEART RATE: 86 BPM | DIASTOLIC BLOOD PRESSURE: 76 MMHG | WEIGHT: 241 LBS | OXYGEN SATURATION: 98 % | RESPIRATION RATE: 18 BRPM | BODY MASS INDEX: 41.15 KG/M2 | HEIGHT: 64 IN

## 2019-02-13 DIAGNOSIS — M79.671 FOOT PAIN, RIGHT: Primary | ICD-10-CM

## 2019-02-13 DIAGNOSIS — I10 ESSENTIAL HYPERTENSION: ICD-10-CM

## 2019-02-13 DIAGNOSIS — G57.11 MERALGIA PARESTHETICA OF RIGHT SIDE: ICD-10-CM

## 2019-02-13 DIAGNOSIS — E66.01 MORBID OBESITY DUE TO EXCESS CALORIES (HCC): ICD-10-CM

## 2019-02-13 PROCEDURE — 99214 OFFICE O/P EST MOD 30 MIN: CPT | Performed by: INTERNAL MEDICINE

## 2019-02-13 RX ORDER — CANDESARTAN 32 MG/1
32 TABLET ORAL DAILY
Qty: 90 TABLET | Refills: 1 | Status: SHIPPED | OUTPATIENT
Start: 2019-02-13 | End: 2019-09-13 | Stop reason: SDUPTHER

## 2019-02-13 ASSESSMENT — ENCOUNTER SYMPTOMS
EYE DISCHARGE: 0
RESPIRATORY NEGATIVE: 1
EYES NEGATIVE: 1

## 2019-02-21 ENCOUNTER — HOSPITAL ENCOUNTER (OUTPATIENT)
Dept: MRI IMAGING | Age: 59
Discharge: HOME | End: 2019-02-21
Payer: COMMERCIAL

## 2019-02-21 DIAGNOSIS — G57.11 MERALGIA PARESTHETICA OF RIGHT SIDE: ICD-10-CM

## 2019-02-21 PROCEDURE — 72197 MRI PELVIS W/O & W/DYE: CPT

## 2019-02-21 PROCEDURE — 6360000004 HC RX CONTRAST MEDICATION: Performed by: INTERNAL MEDICINE

## 2019-02-21 PROCEDURE — A9579 GAD-BASE MR CONTRAST NOS,1ML: HCPCS | Performed by: INTERNAL MEDICINE

## 2019-02-21 RX ADMIN — GADOTERIDOL 20 ML: 279.3 INJECTION, SOLUTION INTRAVENOUS at 16:31

## 2019-03-05 ENCOUNTER — OFFICE VISIT (OUTPATIENT)
Dept: PRIMARY CARE CLINIC | Age: 59
End: 2019-03-05
Payer: COMMERCIAL

## 2019-03-05 VITALS
OXYGEN SATURATION: 97 % | DIASTOLIC BLOOD PRESSURE: 83 MMHG | HEART RATE: 69 BPM | HEIGHT: 64 IN | BODY MASS INDEX: 42.44 KG/M2 | SYSTOLIC BLOOD PRESSURE: 138 MMHG | WEIGHT: 248.6 LBS | RESPIRATION RATE: 18 BRPM

## 2019-03-05 DIAGNOSIS — M79.651 RIGHT THIGH PAIN: ICD-10-CM

## 2019-03-05 DIAGNOSIS — Z13.220 SCREENING, LIPID: ICD-10-CM

## 2019-03-05 DIAGNOSIS — I10 ESSENTIAL HYPERTENSION: ICD-10-CM

## 2019-03-05 DIAGNOSIS — M79.674 PAIN OF TOE OF RIGHT FOOT: ICD-10-CM

## 2019-03-05 DIAGNOSIS — E66.01 MORBID OBESITY DUE TO EXCESS CALORIES (HCC): ICD-10-CM

## 2019-03-05 DIAGNOSIS — L85.3 XEROSIS CUTIS: ICD-10-CM

## 2019-03-05 DIAGNOSIS — D64.9 ANEMIA, UNSPECIFIED TYPE: ICD-10-CM

## 2019-03-05 DIAGNOSIS — Z86.32 HISTORY OF GESTATIONAL DIABETES: ICD-10-CM

## 2019-03-05 DIAGNOSIS — M25.571 CHRONIC PAIN OF RIGHT ANKLE: ICD-10-CM

## 2019-03-05 DIAGNOSIS — G89.29 CHRONIC PAIN OF RIGHT ANKLE: ICD-10-CM

## 2019-03-05 DIAGNOSIS — Z91.09 ENVIRONMENTAL ALLERGIES: ICD-10-CM

## 2019-03-05 DIAGNOSIS — K21.9 GASTROESOPHAGEAL REFLUX DISEASE, ESOPHAGITIS PRESENCE NOT SPECIFIED: ICD-10-CM

## 2019-03-05 DIAGNOSIS — M17.11 ARTHRITIS OF KNEE, RIGHT: ICD-10-CM

## 2019-03-05 DIAGNOSIS — E55.9 VITAMIN D DEFICIENCY: Primary | ICD-10-CM

## 2019-03-05 PROCEDURE — 99215 OFFICE O/P EST HI 40 MIN: CPT | Performed by: FAMILY MEDICINE

## 2019-03-05 RX ORDER — FLUTICASONE PROPIONATE 50 MCG
2 SPRAY, SUSPENSION (ML) NASAL DAILY
Qty: 1 BOTTLE | Refills: 5 | Status: SHIPPED | OUTPATIENT
Start: 2019-03-05 | End: 2019-09-13 | Stop reason: SDUPTHER

## 2019-03-05 RX ORDER — HYDROCHLOROTHIAZIDE 25 MG/1
25 TABLET ORAL EVERY MORNING
Qty: 90 TABLET | Refills: 1 | Status: SHIPPED | OUTPATIENT
Start: 2019-03-05 | End: 2019-12-30 | Stop reason: SDUPTHER

## 2019-03-05 RX ORDER — OMEPRAZOLE 40 MG/1
40 CAPSULE, DELAYED RELEASE ORAL
Qty: 90 CAPSULE | Refills: 1 | Status: SHIPPED | OUTPATIENT
Start: 2019-03-05 | End: 2019-12-30

## 2019-03-05 RX ORDER — METHYLPREDNISOLONE 4 MG/1
TABLET ORAL
Qty: 1 KIT | Refills: 0 | Status: SHIPPED | OUTPATIENT
Start: 2019-03-05 | End: 2019-03-11

## 2019-04-02 ENCOUNTER — OFFICE VISIT (OUTPATIENT)
Dept: PRIMARY CARE CLINIC | Age: 59
End: 2019-04-02
Payer: COMMERCIAL

## 2019-04-02 VITALS
WEIGHT: 239 LBS | OXYGEN SATURATION: 96 % | SYSTOLIC BLOOD PRESSURE: 124 MMHG | HEIGHT: 64 IN | TEMPERATURE: 97.5 F | HEART RATE: 82 BPM | DIASTOLIC BLOOD PRESSURE: 81 MMHG | BODY MASS INDEX: 40.8 KG/M2

## 2019-04-02 DIAGNOSIS — T30.4 CHEMICAL BURN: ICD-10-CM

## 2019-04-02 DIAGNOSIS — H01.002 BLEPHARITIS OF RIGHT LOWER EYELID, UNSPECIFIED TYPE: ICD-10-CM

## 2019-04-02 DIAGNOSIS — M65.9 TENOSYNOVITIS OF FINGER: ICD-10-CM

## 2019-04-02 DIAGNOSIS — G89.29 CHRONIC RIGHT SHOULDER PAIN: ICD-10-CM

## 2019-04-02 DIAGNOSIS — L30.9 ECZEMA, UNSPECIFIED TYPE: Primary | ICD-10-CM

## 2019-04-02 DIAGNOSIS — M25.511 CHRONIC RIGHT SHOULDER PAIN: ICD-10-CM

## 2019-04-02 PROBLEM — M65.949 TENOSYNOVITIS OF FINGER: Status: ACTIVE | Noted: 2019-04-02

## 2019-04-02 PROCEDURE — 99214 OFFICE O/P EST MOD 30 MIN: CPT | Performed by: INTERNAL MEDICINE

## 2019-04-02 RX ORDER — CEPHALEXIN 500 MG/1
500 CAPSULE ORAL 2 TIMES DAILY
Qty: 14 CAPSULE | Refills: 0 | Status: SHIPPED | OUTPATIENT
Start: 2019-04-02 | End: 2019-04-09

## 2019-04-02 RX ORDER — ERYTHROMYCIN 5 MG/G
OINTMENT OPHTHALMIC
Qty: 1 TUBE | Refills: 0 | Status: SHIPPED | OUTPATIENT
Start: 2019-04-02 | End: 2019-04-12

## 2019-04-02 RX ORDER — BACITRACIN, NEOMYCIN, POLYMYXIN B 400; 3.5; 5 [USP'U]/G; MG/G; [USP'U]/G
OINTMENT TOPICAL
Qty: 60 G | Refills: 1 | Status: SHIPPED | OUTPATIENT
Start: 2019-04-02 | End: 2019-04-12

## 2019-04-02 ASSESSMENT — PATIENT HEALTH QUESTIONNAIRE - PHQ9
SUM OF ALL RESPONSES TO PHQ QUESTIONS 1-9: 0
SUM OF ALL RESPONSES TO PHQ9 QUESTIONS 1 & 2: 0
2. FEELING DOWN, DEPRESSED OR HOPELESS: 0
1. LITTLE INTEREST OR PLEASURE IN DOING THINGS: 0
SUM OF ALL RESPONSES TO PHQ QUESTIONS 1-9: 0

## 2019-04-02 ASSESSMENT — ENCOUNTER SYMPTOMS
RESPIRATORY NEGATIVE: 1
EYE DISCHARGE: 0
EYE PAIN: 1
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
## 2019-04-09 ENCOUNTER — TELEPHONE (OUTPATIENT)
Dept: ORTHOPEDIC SURGERY | Age: 59
End: 2019-04-09

## 2019-04-09 DIAGNOSIS — M65.28 CALCIFIC ACHILLES TENDINITIS OF RIGHT LOWER EXTREMITY: Primary | ICD-10-CM

## 2019-04-09 DIAGNOSIS — M25.571 RIGHT ANKLE PAIN, UNSPECIFIED CHRONICITY: ICD-10-CM

## 2019-04-09 DIAGNOSIS — M92.61 HAGLUND'S DEFORMITY OF RIGHT HEEL: ICD-10-CM

## 2019-04-09 NOTE — TELEPHONE ENCOUNTER
Pt was unable to do therapy for her ankle in January and is asking for a new order to be put in   pls call pt when order for therapy is done   Pt wants therapy at [facility]

## 2019-05-01 ENCOUNTER — OFFICE VISIT (OUTPATIENT)
Dept: ORTHOPEDIC SURGERY | Age: 59
End: 2019-05-01
Payer: COMMERCIAL

## 2019-05-01 VITALS
SYSTOLIC BLOOD PRESSURE: 139 MMHG | WEIGHT: 239 LBS | HEIGHT: 64 IN | BODY MASS INDEX: 40.8 KG/M2 | DIASTOLIC BLOOD PRESSURE: 82 MMHG | HEART RATE: 74 BPM

## 2019-05-01 DIAGNOSIS — M65.30 TRIGGER FINGER, ACQUIRED: Primary | ICD-10-CM

## 2019-05-01 PROCEDURE — 20550 NJX 1 TENDON SHEATH/LIGAMENT: CPT | Performed by: ORTHOPAEDIC SURGERY

## 2019-05-01 PROCEDURE — 99213 OFFICE O/P EST LOW 20 MIN: CPT | Performed by: ORTHOPAEDIC SURGERY

## 2019-05-01 NOTE — Clinical Note
Dear  Mike Atkins MD & Hussein Masterson MD Thank you very much for your referral or Ms. Юлия Barahona to me for evaluation and treatment of her Hand & Wrist condition. I appreciate your confidence in me and thank you for allowing me the opportunity to care for your patients. If I can be of any further assistance to you on this or any other patient, please do not hesitate to contact me. Sincerely,Curtis Iqbal MD

## 2019-05-01 NOTE — PATIENT INSTRUCTIONS
Information & Instructions   After Finger, Hand, Wrist, or Elbow Injection    Alta Ledezma MD    You have received an injection of local anesthetic (Bupivicaine without Epinephrine) for comfort & a steroid (Kenalog) for its strong anti-inflammatory effects. In order to give the medication a chance to reduce your inflammation and discomfort, it is recommended that you take it easy for a day or so. You may use your hand and arm as you feel comfortable, but you should avoid highly strenuous activity and heavy use for several days. Relief from the injection will often not begin for several days, and you may not feel full relief for up to one month. It is not uncommon to experience some local discomfort or pain at or around the injection site for a few days. To relieve these symptoms you may do the following if you feel necessary:       Apply ice to the affected area 20 minutes on and 20 minutes off. Do not apply ice directly to the skin. Use a thin layer (T-shirt, pillowcase, towel, etc.) to protect the skin. - If allowed by your other medical physicians, you may take -     2 Tylenol extra strength tablets every 4-6 hours       1-2 Aleve tablets twice a day     2-3 Advil tablets two to three times a day    If you are diabetic, the steroid medication may increase your blood sugar, so you are advised to monitor your sugar more closely so you can adjust it accordingly for a few days following your injection. If you need assistance with the control of you blood sugar, please contact you primary care physician for further advice. I will request that you please call the office one month after your injection at [phone] if you have not experienced relief of your symptoms (unless I have instructed you otherwise). If your injection has given you good relief of you symptoms as expected, then you only need to call the office if your symptoms return.

## 2019-05-01 NOTE — PROGRESS NOTES
Ms. Natanael Posey is a 66 y.o. right handed woman  who is seen today in Hand Surgical Consultation at the request of Jaiden Molina MD.    She presents today regarding left symptoms which have been present for approximately 2 months. A history of antecedent trauma or injury is Absent. She reports symptoms to include moderate pain and stiffness with occasional popping, catching or locking of the left Middle Finger. Finger symptoms are Frequently worse in the morning or overnight. She reports moderate pain located at the base of the symptomatic finger(s). Symptoms are worsening over time. Previous treatment has included none. She does not claim relation of her symptoms to her required work activities. She has not undergone any form of testing. The patient's , past medical history, medications, allergies,  family history, social history, and review of systems have been reviewed, and dated and are recorded in the chart. Physical Exam:  Ms. Washington Deluca most recent vitals:  Vitals  BP: 139/82  Pulse: 74  Height: 5' 4\" (162.6 cm)  Weight: 239 lb (108.4 kg)    She is well nourished, oriented to person, place & time. She demonstrates appropriate mood and affect as well as normal gait and station. Skin: Skin color, texture, turgor normal. No rashes or lesions bilaterally  Digital range of motion is limited by pain. Active triggering is noted upon flexion in the left Middle Finger. There is not an associated flexion contracture of the PIP joint. No other digit demonstrates evidence for stenosing tenosynovitis bilaterally. Wrist range of motion is equal bilaterally otherwise normal bilaterally. Sensation is normal in the Whole Hand bilaterally  Vascular examination reveals normal and good capillary refill bilaterally  Swelling is mild in the symptomatic digit, absent elsewhere bilaterally  There is no evidence of gross joint instability bilaterally.   Muscular strength is clinically appropriate conditions the flexor tendon sheath was injected with a combination of 1/2 ml of 0.25% Bupivacaine without Epinephrine and 20 mg of Triamcinolone (40 mg/ml). Good filling of the flexor sheath was noted. A dry sterile bandage was applied and the patient tolerated the injection without difficulty. I advised the patient of the expected response, possible reactions and the instructions for care of the hand. I have also discussed with Ms. Юлия Barahona the other treatment options available to her for this condition. We have today selected to proceed with treatment by injection with steroid medication. She and I have agreed that if our current course of Injection treatment does not prove to be effective over the short term future, that she will schedule a follow-up appointment to discuss and select an alternate course of therapy including possibly further conservative treatment or surgical treatment. Ms. Erlin Johnson has been given a full verbal list of instructions and precautions related to her present condition. I have asked her to followup with me in the office at the prescribed time. She is also specifically requested to call or return to the office sooner if her symptoms change or worsen prior to the next scheduled appointment.

## 2019-05-14 ENCOUNTER — TELEPHONE (OUTPATIENT)
Dept: ORTHOPEDIC SURGERY | Age: 59
End: 2019-05-14

## 2019-05-14 DIAGNOSIS — M65.28 CALCIFIC ACHILLES TENDINITIS OF RIGHT LOWER EXTREMITY: Primary | ICD-10-CM

## 2019-05-14 DIAGNOSIS — M92.61 HAGLUND'S DEFORMITY OF RIGHT HEEL: ICD-10-CM

## 2019-06-06 ENCOUNTER — HOSPITAL ENCOUNTER (OUTPATIENT)
Dept: PHYSICAL THERAPY | Age: 59
Setting detail: THERAPIES SERIES
Discharge: HOME | End: 2019-06-06
Payer: COMMERCIAL

## 2019-06-06 PROCEDURE — 97110 THERAPEUTIC EXERCISES: CPT

## 2019-06-06 PROCEDURE — 97033 APP MDLTY 1+IONTPHRSIS EA 15: CPT

## 2019-06-06 PROCEDURE — 97161 PT EVAL LOW COMPLEX 20 MIN: CPT

## 2019-06-06 NOTE — PLAN OF CARE
Outpatient Physical Therapy  Phone: [phone] Fax: [phone]    To: Referring Practitioner: Dr. 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 Thom Weber,   The following patient has been evaluated for physical therapy services and for therapy to continue, Medicare requires monthly physician review of the treatment plan. Please review the attached evaluation and/or summary of the patient's plan of care, and verify that you agree therapy should continue by signing the attached document and sending it back to our office.     Plan of Care/Treatment to date:  [x] Therapeutic Exercise (Review/Progress HEP and provide verbal/tactile cueing for activities related to strengthening, flexibility,  endurance, ROM.)       [x] Therapeutic Activity (Provide verbal/tactile cueing for dynamic activities to promote functional tasks.)          [x] Gait Training (Provide verbal/tactile/visual cueing for facilitation of normalized gait pattern without or with the least restrictive AD to decrease pain and/or risk for falling.)          [x] Neuromuscular Re-education (Review/Progress HEP and provide verbal/tactile cueing for activities related to improving balance, coordination, kinesthetic sense, posture, motor skill, proprioception.)         [x] Manual Therapy (Provide manual therapy to mobilize soft tissue/joints for the purpose of modulating pain, promoting relaxation, increasing ROM, reducing/eliminating soft tissue swelling/inflammation/tightness, improving soft tissue extensibility)   [x] Dry Needling    [] Aquatic Therapy (Facilitate muscle relaxation and increases peripheral circulation; stimulates body awareness, balance, and trunk stability.)                  [x] Modalities (For modulating pain/tenderness/paresthesias, reducing swelling/inflammation/tightness, improving soft tissue extensibility, and/or to increase muscle tone/strength):     [] Ultrasound  [] Electrical Stimulation        [] Cervical Traction [] Lumbar Traction    ? [] Cold/hotpack [] Iontophoresis   Other:      []          []      Assessment:  Conditions Requiring Skilled Therapeutic Intervention  Body structures, Functions, Activity limitations: Decreased functional mobility , Decreased ROM, Increased Pain  Assessment: Pt presents with pain, decreased ROM/flexibility limiting N gait, out of bed in AM, getting up after prolonged sitting, dancing  Treatment Diagnosis: R achilles pain  Prognosis: Good  Decision Making: Low Complexity  REQUIRES PT FOLLOW UP: Yes    Goals:  Short term goals  Time Frame for Short term goals: 2 weeks  Short term goal 1: Pt will demo good understanding and knowledge of initial HEP  Short term goal 2: Decreased pain 6/10 at worst to allow for improved tolerance to getting up after prolonged sitting  Short term goal 3: AROM R ankle DF 5 deg to allow for improved gait pattern  Long term goals  Time Frame for Long term goals : 4 weeks  Long term goal 1: Pt will demo good understanding and knowledge of HEP progressions  Long term goal 2: Decreased pain 3/10 at worst to allow for rare complaints getting up after prolonged sitting or getting up in AM  Long term goal 3: AROM R ankle WFLs, R knee 0-115, R HS 90-90 (-) 20 to facilitate N gait pattern with gait speed: 3.27 ft/sec  Long term goal 4: Decreased impairment per LEFS <5% impaired    Frequency/Duration:  # Days per week: [] 1 day # Weeks: [] 1 week [] 5 weeks     [x] 2 days?    [] 2 weeks [] 6 weeks     [] 3 days   [] 3 weeks [] 7 weeks     [] 4 days   [x] 4 weeks [] 8 weeks    Rehab Potential: [] Excellent [x] Good [] Fair  [] Poor       Electronically signed by: Sarah Cruz PT, DPT [id number]        If you have any questions or concerns, please don't hesitate to call.   Thank you for your referral.      Physician Signature:________________________________Date:__________________  By signing above, therapists plan is approved by physician
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
## 2019-06-06 NOTE — PROGRESS NOTES
Physical Therapy  Initial Assessment  Date: 2019  Patient Name: Adrienne Alexander  MRN: [id number]  : 1960     Treatment Diagnosis: R achilles pain    Restrictions  Restrictions/Precautions  Restrictions/Precautions: (HTN)    Subjective   General  Chart Reviewed: Yes  Patient assessed for rehabilitation services?: Yes  Additional Pertinent Hx: HTN  Referring Practitioner: Dr. 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  The remainder of the musculotendinous structures are normal.     Intact ligaments.  No marrow edema, occult fracture or osteochondral    lesion. PT Visit Information  Onset Date: (~ 2 years ago)  PT Insurance Information: [employer], $25 copay  Total # of Visits Approved: 8  Total # of Visits to Date: 1  Subjective  Subjective: Pt reports about 2 years ago had R distal achilles pain. Started when she was sitting in a love seat and when went to get up her R foot slipped back and hit the wood frame and since has had pain. The pain lingered. She had R knee scope May 2018 and after that the pain got more intense to the R back of heel area. Pain currently 5/10 comes and goes, at best 0/10 and at worst 10/10. Pain is described as aching without paresthesias but does report N/T to R thigh - unsure of cause, started well after R knee scope. Pain is worse with walking, running, dancing, getting up from prolonged sitting or getting out of bed and is better when sleeping. Has tried ice but was not effective. Pt works as a teacher. PLOF: no pain or limitations to R distal achilles prior to 2 years ago. Social/Functional History  Social/Functional History  Type of Home: House(bedroom upstairs)  Active : No    Objective     Observation/Palpation  Palpation: significant tenderness R distal achilles  Observation: increased swelling R distal achilles, ambulates with decreased push-off R vs L, decreased heelstrike R vs L, moderate antalgia with gait speed: 2.94 ft/sec    AROM RLE (degrees)  RLE General AROM: DF 0 deg, PF 45 deg, IV 16 deg, EV 4 deg, knee 0-108 deg  AROM LLE (degrees)  LLE General AROM: DF 10 deg, PF 40 deg, IV 18 deg, EV 4 deg, knee 0-115 deg    Strength RLE  Strength RLE: WFL  Comment: Ankle 5/5 in sitting  Strength LLE  Strength LLE: WFL  Comment: Ankle 5/5 in sitting     Additional Measures  Flexibility: HS 90-90 L (-) 20 deg, R (-) 35 limited by knee pain  Other: LEFS = 17.5% impaired    Assessment   Conditions Requiring Skilled Therapeutic Intervention  Body structures, Functions, Activity limitations: Decreased functional mobility ; Decreased ROM; Increased Pain  Assessment: Pt presents with pain, decreased ROM/flexibility limiting N gait, out of bed in AM, getting up after prolonged sitting, dancing  Treatment Diagnosis: R achilles pain  Prognosis: Good  Decision Making: Low Complexity  REQUIRES PT FOLLOW UP: Yes  Activity Tolerance  Activity Tolerance: Patient Tolerated treatment well         Plan   Plan  Times per week: 2  Plan weeks: 4  Plan Comment: Plan of care initiated    Goals  Short term goals  Time Frame for Short term goals: 2 weeks  Short term goal 1: Pt will demo good understanding and knowledge of initial HEP  Short term goal 2: Decreased pain 6/10 at worst to allow for improved tolerance to getting up after prolonged sitting  Short term goal 3: AROM R ankle DF 5 deg to allow for improved gait pattern  Long term goals  Time Frame for Long term goals : 4 weeks  Long term goal 1: Pt will demo good understanding and knowledge of HEP progressions  Long term goal 2: Decreased pain 3/10 at worst to allow for rare complaints getting up after prolonged sitting or getting up in AM  Long term goal 3: AROM R ankle WFLs, R knee 0-115, R HS 90-90 (-) 20 to facilitate N gait pattern with gait speed: 3.27 ft/sec  Long term goal 4: Decreased impairment per LEFS <5% impaired  Patient Goals   Patient goals : \"stop aches\"       Therapy Time   Individual Concurrent Group Co-treatment   Time In 0535         Time Out 0640         Minutes 65         Timed Code Treatment Minutes: [address], [place], DPT [id number]

## 2019-06-06 NOTE — PROGRESS NOTES
The Lower Extremity Functional Scale    Patient: Susie Landis  : 1960  MRN: [id number]  Date: 2019  Electronically Signed by: Jl Duran PT, DPT [id number]     We are interested in knowing whether you are having any difficulty at all with the activities listed below because of your lower limb problem for which you are currently seeking attention. Please provide an answer for each activity.          Today would you have difficulty at all with:    Activities Extreme Difficulty or Unable to Perform Activity Quite a Bit of Difficulty Moderate Difficulty A Little Bit of Difficulty No Difficulty   1 Any of your usual work, housework, or school activities []0 []1 []2  []3 [x]4   2 Your usual hobbies, recreational, or sporting activities []0 []1 [x]2 []3 []4   3 Getting into or out of the bath []0 []1 []2 []3 [x]4   4 Walking between rooms []0 []1 []2 []3 [x]4   5 Putting on your shoes or socks []0 []1 []2 []3 [x]4   6 Squatting []0 []1 []2 [x]3 []4   7 Lifting an object, like a bag of groceries from the floor []0 []1 []2 []3 [x]4   8 Performing light activities around your home []0 []1 []2 []3 [x]4   9 Performing heavy activities around your home []0 []1 []2 []3 [x]4   10 Getting into or out of a car []0 []1 []2 [x]3 []4   11 Walking 2 blocks []0 []1 []2 [x]3 []4   12 Walking a mile []0 []1 []2 [x]3 []4   13 Going up or down 10 stairs (about 1 flight of stairs) []0 []1 []2 [x]3 []4   14 Standing for 1 hour []0 []1 []2 [x]3 []4   15 Sitting for 1 hour []0 []1 []2 []3 [x]4   16 Running on even ground  []0 []1 []2 [x]3 []4   17 Running on uneven ground  []0 []1 []2 [x]3 []4   18 Making sharp turns while running fast  []0 []1 []2 [x]3 []4   19 Hopping []0 [x]1 []2 []3 []4   20 Rolling over in bed []0 []1 []2 []3 [x]4    Column Totals:          Patient Score from Above:  66/80    (Patient Score / 80) X 100:  82.5%                          Scoring Method for Lower Extremity Functional Scale    The Lower Extremity

## 2019-06-20 ENCOUNTER — APPOINTMENT (OUTPATIENT)
Dept: PHYSICAL THERAPY | Age: 59
End: 2019-06-20
Payer: COMMERCIAL

## 2019-06-22 ENCOUNTER — APPOINTMENT (OUTPATIENT)
Dept: GENERAL RADIOLOGY | Age: 59
End: 2019-06-22
Payer: COMMERCIAL

## 2019-06-22 ENCOUNTER — HOSPITAL ENCOUNTER (EMERGENCY)
Age: 59
Discharge: HOME | End: 2019-06-22
Attending: EMERGENCY MEDICINE
Payer: COMMERCIAL

## 2019-06-22 VITALS
RESPIRATION RATE: 16 BRPM | HEART RATE: 66 BPM | WEIGHT: 230.9 LBS | BODY MASS INDEX: 39.42 KG/M2 | TEMPERATURE: 98.3 F | OXYGEN SATURATION: 97 % | DIASTOLIC BLOOD PRESSURE: 75 MMHG | HEIGHT: 64 IN | SYSTOLIC BLOOD PRESSURE: 131 MMHG

## 2019-06-22 DIAGNOSIS — R03.0 ELEVATED BLOOD PRESSURE READING: ICD-10-CM

## 2019-06-22 DIAGNOSIS — R07.89 CHEST WALL PAIN: Primary | ICD-10-CM

## 2019-06-22 DIAGNOSIS — S90.32XA CONTUSION OF LEFT HEEL, INITIAL ENCOUNTER: ICD-10-CM

## 2019-06-22 LAB
ANION GAP SERPL CALCULATED.3IONS-SCNC: 10 MMOL/L (ref 3–16)
BASOPHILS ABSOLUTE: 0 K/UL (ref 0–0.2)
BASOPHILS RELATIVE PERCENT: 1 %
BUN BLDV-MCNC: 15 MG/DL (ref 7–20)
CALCIUM SERPL-MCNC: 9.7 MG/DL (ref 8.3–10.6)
CHLORIDE BLD-SCNC: 107 MMOL/L (ref 99–110)
CO2: 27 MMOL/L (ref 21–32)
CREAT SERPL-MCNC: 0.8 MG/DL (ref 0.6–1.1)
EOSINOPHILS ABSOLUTE: 0.2 K/UL (ref 0–0.6)
EOSINOPHILS RELATIVE PERCENT: 3.5 %
GFR AFRICAN AMERICAN: >60
GFR NON-AFRICAN AMERICAN: >60
GLUCOSE BLD-MCNC: 117 MG/DL (ref 70–99)
HCT VFR BLD CALC: 41.6 % (ref 36–48)
HEMOGLOBIN: 13.8 G/DL (ref 12–16)
LYMPHOCYTES ABSOLUTE: 1.4 K/UL (ref 1–5.1)
LYMPHOCYTES RELATIVE PERCENT: 27.8 %
MCH RBC QN AUTO: 28.6 PG (ref 26–34)
MCHC RBC AUTO-ENTMCNC: 33.3 G/DL (ref 31–36)
MCV RBC AUTO: 85.9 FL (ref 80–100)
MONOCYTES ABSOLUTE: 0.5 K/UL (ref 0–1.3)
MONOCYTES RELATIVE PERCENT: 8.8 %
NEUTROPHILS ABSOLUTE: 3 K/UL (ref 1.7–7.7)
NEUTROPHILS RELATIVE PERCENT: 58.9 %
PDW BLD-RTO: 14.3 % (ref 12.4–15.4)
PLATELET # BLD: 238 K/UL (ref 135–450)
PMV BLD AUTO: 8.9 FL (ref 5–10.5)
POTASSIUM REFLEX MAGNESIUM: 4.7 MMOL/L (ref 3.5–5.1)
RBC # BLD: 4.85 M/UL (ref 4–5.2)
SODIUM BLD-SCNC: 144 MMOL/L (ref 136–145)
TROPONIN: <0.01 NG/ML
WBC # BLD: 5.1 K/UL (ref 4–11)

## 2019-06-22 PROCEDURE — 73650 X-RAY EXAM OF HEEL: CPT

## 2019-06-22 PROCEDURE — 84484 ASSAY OF TROPONIN QUANT: CPT

## 2019-06-22 PROCEDURE — 80048 BASIC METABOLIC PNL TOTAL CA: CPT

## 2019-06-22 PROCEDURE — 99285 EMERGENCY DEPT VISIT HI MDM: CPT

## 2019-06-22 PROCEDURE — 85025 COMPLETE CBC W/AUTO DIFF WBC: CPT

## 2019-06-22 PROCEDURE — 93005 ELECTROCARDIOGRAM TRACING: CPT | Performed by: EMERGENCY MEDICINE

## 2019-06-22 PROCEDURE — 71046 X-RAY EXAM CHEST 2 VIEWS: CPT

## 2019-06-22 RX ORDER — MELOXICAM 15 MG/1
15 TABLET ORAL DAILY PRN
Qty: 30 TABLET | Refills: 1 | Status: SHIPPED | OUTPATIENT
Start: 2019-06-22 | End: 2019-12-17 | Stop reason: CLARIF

## 2019-06-22 ASSESSMENT — PAIN DESCRIPTION - PAIN TYPE: TYPE: ACUTE PAIN

## 2019-06-22 ASSESSMENT — PAIN SCALES - GENERAL: PAINLEVEL_OUTOF10: 1

## 2019-06-22 ASSESSMENT — PAIN DESCRIPTION - ONSET: ONSET: ON-GOING

## 2019-06-22 ASSESSMENT — PAIN DESCRIPTION - DESCRIPTORS: DESCRIPTORS: PRESSURE

## 2019-06-22 ASSESSMENT — HEART SCORE: ECG: 0

## 2019-06-22 NOTE — ED PROVIDER NOTES
TRIAGE CHIEF COMPLAINT:   Chief Complaint   Patient presents with    Chest Pain     r/t to shoulder     Foot Pain     heel        HPI: Sarabjit Rodas is a 66 y.o. female who presents to the emergency department with complaint of chest pain. 2 days ago she noted some nagging bilateral upper chest pain described as tightness. The pain has been improving but has not completely gone away. Pain is currently 1/10. It does not worsen with exertion or breathing. She states she has not actually short of breath but feels like she has to concentrate on breathing. Denies diaphoresis. She states she has had a few palpitations. She has had a recent nonproductive cough. Denies fever or chills. No leg pain or swelling. The pain does not radiate to the neck jaw or arms. She has a history of hypertension and obesity but denies any other risk factors for coronary disease. Denies any risk factors for PE. She also complains of some pain on the posterior aspect of her left heel which she states she injured 2 days ago and she started to fall on the steps and scraped the back of her heel against the edge of the step. Denies any calf pain or swelling. No knee pain or hip pain. REVIEW OF SYSTEMS:   10 systems reviewed. Pertinent positives per HPI. Otherwise noted to be negative. I have reviewed the triage/nursing documentation and agree unless otherwise noted below.     PAST MEDICAL HISTORY:   Past Medical History:   Diagnosis Date    GERD (gastroesophageal reflux disease)     Gestational diabetes     Hypertension     Morbid obesity due to excess calories ([facility] [address] 12/29/2016    Vitamin D deficiency     Xerosis cutis         CURRENT MEDICATIONS:   Patient's Medications   New Prescriptions    No medications on file   Previous Medications    CANDESARTAN (ATACAND) 32 MG TABLET    Take 1 tablet by mouth daily    FLUTICASONE (FLONASE) 50 MCG/ACT NASAL SPRAY    2 sprays by Each Nare route daily    HYDROCHLOROTHIAZIDE redness or abrasion. Mild tenderness noted on the posterior aspect of the left heel. Achilles tendon is intact. No ankle or foot tenderness. Neurologic:  Alert & oriented x 3, Speech is clear and appropriate, No upper extremity drift or lower extremity weakness,  Normal sensory function, No facial asymmetry, no truncal or extremity ataxia. Normal gait. Skin:  Warm, Dry, No erythema, No rash  Psychiatric:  Affect normal, Mood normal      EKG:    EKG interpreted by myself. Sinus rhythm at a rate of 84. Axis is 0. Possible LVH. There is no ectopy. No ischemia seen. QTC is 434. Radiology:  XR CHEST STANDARD (2 VW)   Final Result      No acute findings      Left calcaneus      2 views      No fracture      Large calcaneal spur and small plantar fascial spur      Moderate degenerative change tibiotalar joint, talonavicular joint and navicular cuneiform articulation. IMPRESSION:      Large calcaneal spur. Degenerative change. No fracture. If concern for acute injury splinting with follow-up recommended. XR CALCANEUS LEFT (MIN 2 VIEWS)   Final Result      No acute findings      Left calcaneus      2 views      No fracture      Large calcaneal spur and small plantar fascial spur      Moderate degenerative change tibiotalar joint, talonavicular joint and navicular cuneiform articulation. IMPRESSION:      Large calcaneal spur. Degenerative change. No fracture. If concern for acute injury splinting with follow-up recommended.           LAB  Labs Reviewed   BASIC METABOLIC PANEL W/ REFLEX TO MG FOR LOW K - Abnormal; Notable for the following components:       Result Value    Glucose 117 (*)     All other components within normal limits    Narrative:     Performed at:  [facility]) - [facility] [place]  [address],  [address]   Phone [phone]   CBC WITH AUTO DIFFERENTIAL    Narrative:     Performed at:  [facility]  [address]   Phone [phone]   TROPONIN    Narrative:     Performed at:  [facility]  [address]   Phone [phone]       ED COURSE & MEDICAL DECISION MAKING:  Pertinent Labs & Imaging studies reviewed. (See chart for details)  56-year-old female with complaints of chest pain that started 2 days ago. The pain is in the bilateral upper parasternal area. It does not worsen with exertion or breathing. No radiation. She does have some tenderness to palpation in this area seems to mimic the pain. EKG shows no ischemia or arrhythmia. CBC, BMP and troponin were normal.  Chest x-ray was a negative study as read by the radiologist reviewed by myself. The patient's heart score is 2. I see no clinical evidence for coronary ischemia, dissection, pneumothorax, CHF, PE or pneumonia. Likely this is musculoskeletal.  She also has some pain in the left heel secondary to contusion. X-rays are negative. No clinical evidence of DVT or arterial insufficiency. I recommended anti-inflammatory medicine for the patient's discomfort. Advised follow-up with her primary care doctor and return here for any worse symptoms. I discussed with Юлия Barahona the exam results, diagnosis, care, prognosis and the importance of follow-up. The patient is stable for discharge. The patient and/or family are in agreement with the plan and all questions have been answered. Specific discharge instructions were explained, including reasons to return to the emergency department.       (Please note that portions of this note may have been completed with a voice recognition program.  Efforts were made to edit the dictation but occasionally words are mis-transcribed)      FINAL IMPRESSION:  1 -- chest wall pain  2 -- contusion of left heel  3 -- elevated blood pressure reading                  Jeannine Singh MD  06/23/19 [id number]

## 2019-06-23 LAB
EKG ATRIAL RATE: 84 BPM
EKG DIAGNOSIS: NORMAL
EKG P AXIS: 57 DEGREES
EKG P-R INTERVAL: 136 MS
EKG Q-T INTERVAL: 368 MS
EKG QRS DURATION: 84 MS
EKG QTC CALCULATION (BAZETT): 434 MS
EKG R AXIS: 0 DEGREES
EKG T AXIS: 28 DEGREES
EKG VENTRICULAR RATE: 84 BPM

## 2019-06-23 PROCEDURE — 93010 ELECTROCARDIOGRAM REPORT: CPT | Performed by: INTERNAL MEDICINE

## 2019-06-25 ENCOUNTER — APPOINTMENT (OUTPATIENT)
Dept: PHYSICAL THERAPY | Age: 59
End: 2019-06-25
Payer: COMMERCIAL

## 2019-06-27 ENCOUNTER — APPOINTMENT (OUTPATIENT)
Dept: PHYSICAL THERAPY | Age: 59
End: 2019-06-27
Payer: COMMERCIAL

## 2019-07-14 ENCOUNTER — APPOINTMENT (OUTPATIENT)
Dept: GENERAL RADIOLOGY | Age: 59
End: 2019-07-14
Payer: COMMERCIAL

## 2019-07-14 ENCOUNTER — HOSPITAL ENCOUNTER (EMERGENCY)
Age: 59
Discharge: HOME | End: 2019-07-14
Attending: EMERGENCY MEDICINE
Payer: COMMERCIAL

## 2019-07-14 VITALS
HEART RATE: 87 BPM | TEMPERATURE: 98.7 F | WEIGHT: 224.3 LBS | RESPIRATION RATE: 18 BRPM | OXYGEN SATURATION: 100 % | HEIGHT: 64 IN | BODY MASS INDEX: 38.29 KG/M2 | DIASTOLIC BLOOD PRESSURE: 94 MMHG | SYSTOLIC BLOOD PRESSURE: 158 MMHG

## 2019-07-14 DIAGNOSIS — J06.9 ACUTE UPPER RESPIRATORY INFECTION: Primary | ICD-10-CM

## 2019-07-14 PROCEDURE — 71046 X-RAY EXAM CHEST 2 VIEWS: CPT

## 2019-07-14 PROCEDURE — 70360 X-RAY EXAM OF NECK: CPT

## 2019-07-14 PROCEDURE — 99283 EMERGENCY DEPT VISIT LOW MDM: CPT

## 2019-07-14 RX ORDER — DOXYCYCLINE 100 MG/1
100 TABLET ORAL 2 TIMES DAILY
Qty: 20 TABLET | Refills: 0 | Status: SHIPPED | OUTPATIENT
Start: 2019-07-14 | End: 2019-07-24

## 2019-07-14 RX ORDER — BENZONATATE 100 MG/1
100 CAPSULE ORAL 3 TIMES DAILY PRN
Qty: 30 CAPSULE | Refills: 0 | Status: SHIPPED | OUTPATIENT
Start: 2019-07-14 | End: 2019-07-21

## 2019-07-14 ASSESSMENT — PAIN DESCRIPTION - LOCATION: LOCATION: CHEST

## 2019-07-14 ASSESSMENT — PAIN SCALES - GENERAL: PAINLEVEL_OUTOF10: 4

## 2019-07-14 ASSESSMENT — PAIN DESCRIPTION - PAIN TYPE: TYPE: ACUTE PAIN

## 2019-07-22 ENCOUNTER — HOSPITAL ENCOUNTER (OUTPATIENT)
Dept: PHYSICAL THERAPY | Age: 59
Setting detail: THERAPIES SERIES
Discharge: HOME | End: 2019-07-22
Payer: COMMERCIAL

## 2019-08-02 ENCOUNTER — OFFICE VISIT (OUTPATIENT)
Dept: PRIMARY CARE CLINIC | Age: 59
End: 2019-08-02
Payer: COMMERCIAL

## 2019-08-02 VITALS
TEMPERATURE: 97.9 F | DIASTOLIC BLOOD PRESSURE: 84 MMHG | HEART RATE: 73 BPM | HEIGHT: 64 IN | OXYGEN SATURATION: 97 % | SYSTOLIC BLOOD PRESSURE: 142 MMHG | BODY MASS INDEX: 39.78 KG/M2 | WEIGHT: 233 LBS

## 2019-08-02 DIAGNOSIS — J45.50 SEVERE PERSISTENT ASTHMA, UNSPECIFIED WHETHER COMPLICATED: Primary | ICD-10-CM

## 2019-08-02 DIAGNOSIS — J45.909 ENVIRONMENTAL ASTHMA: ICD-10-CM

## 2019-08-02 DIAGNOSIS — R05.9 COUGH: ICD-10-CM

## 2019-08-02 DIAGNOSIS — I10 ESSENTIAL HYPERTENSION: ICD-10-CM

## 2019-08-02 PROCEDURE — 99214 OFFICE O/P EST MOD 30 MIN: CPT | Performed by: INTERNAL MEDICINE

## 2019-08-02 RX ORDER — PREDNISONE 10 MG/1
TABLET ORAL
Qty: 53 TABLET | Refills: 0 | Status: SHIPPED | OUTPATIENT
Start: 2019-08-02 | End: 2019-08-12

## 2019-08-02 RX ORDER — ALBUTEROL SULFATE 90 UG/1
2 AEROSOL, METERED RESPIRATORY (INHALATION) EVERY 6 HOURS PRN
Qty: 1 INHALER | Refills: 6 | Status: SHIPPED | OUTPATIENT
Start: 2019-08-02 | End: 2019-12-30 | Stop reason: SDUPTHER

## 2019-08-02 RX ORDER — FLUTICASONE PROPIONATE 50 MCG
1 SPRAY, SUSPENSION (ML) NASAL DAILY
Qty: 1 BOTTLE | Refills: 10 | Status: SHIPPED | OUTPATIENT
Start: 2019-08-02 | End: 2019-12-30

## 2019-08-02 ASSESSMENT — ENCOUNTER SYMPTOMS
EYE DISCHARGE: 0
EYES NEGATIVE: 1
COUGH: 1
HEMOPTYSIS: 0
SHORTNESS OF BREATH: 0

## 2019-08-06 DIAGNOSIS — J45.909 UNCOMPLICATED ASTHMA, UNSPECIFIED ASTHMA SEVERITY, UNSPECIFIED WHETHER PERSISTENT: Primary | ICD-10-CM

## 2019-09-01 PROBLEM — R05.9 COUGH: Status: RESOLVED | Noted: 2019-08-02 | Resolved: 2019-09-01

## 2019-09-11 ENCOUNTER — HOSPITAL ENCOUNTER (EMERGENCY)
Age: 59
Discharge: HOME | End: 2019-09-11
Attending: EMERGENCY MEDICINE
Payer: COMMERCIAL

## 2019-09-11 ENCOUNTER — TELEPHONE (OUTPATIENT)
Dept: PRIMARY CARE CLINIC | Age: 59
End: 2019-09-11

## 2019-09-11 VITALS
HEART RATE: 61 BPM | OXYGEN SATURATION: 98 % | SYSTOLIC BLOOD PRESSURE: 157 MMHG | WEIGHT: 237.5 LBS | BODY MASS INDEX: 40.77 KG/M2 | TEMPERATURE: 98.5 F | DIASTOLIC BLOOD PRESSURE: 89 MMHG

## 2019-09-11 DIAGNOSIS — H57.89 EYE IRRITATION: Primary | ICD-10-CM

## 2019-09-11 PROCEDURE — 6370000000 HC RX 637 (ALT 250 FOR IP): Performed by: EMERGENCY MEDICINE

## 2019-09-11 PROCEDURE — 99283 EMERGENCY DEPT VISIT LOW MDM: CPT

## 2019-09-11 RX ORDER — TETRACAINE HYDROCHLORIDE 5 MG/ML
1 SOLUTION OPHTHALMIC ONCE
Status: COMPLETED | OUTPATIENT
Start: 2019-09-11 | End: 2019-09-11

## 2019-09-11 RX ORDER — TOBRAMYCIN 3 MG/ML
1 SOLUTION/ DROPS OPHTHALMIC EVERY 4 HOURS
Qty: 1 BOTTLE | Refills: 0 | Status: SHIPPED | OUTPATIENT
Start: 2019-09-11 | End: 2019-09-21

## 2019-09-11 RX ADMIN — TETRACAINE HYDROCHLORIDE 1 DROP: 5 SOLUTION OPHTHALMIC at 19:34

## 2019-09-11 RX ADMIN — FLUORESCEIN SODIUM 1 MG: 1 STRIP OPHTHALMIC at 19:35

## 2019-09-11 ASSESSMENT — PAIN DESCRIPTION - LOCATION: LOCATION: EYE

## 2019-09-11 ASSESSMENT — PAIN DESCRIPTION - PAIN TYPE: TYPE: ACUTE PAIN

## 2019-09-11 ASSESSMENT — PAIN DESCRIPTION - ORIENTATION: ORIENTATION: LEFT;RIGHT

## 2019-09-11 ASSESSMENT — PAIN SCALES - GENERAL: PAINLEVEL_OUTOF10: 4

## 2019-09-11 ASSESSMENT — PAIN DESCRIPTION - DESCRIPTORS: DESCRIPTORS: BURNING

## 2019-09-11 ASSESSMENT — PAIN DESCRIPTION - FREQUENCY: FREQUENCY: CONTINUOUS

## 2019-09-11 NOTE — ED PROVIDER NOTES
[address]   eMERGENCY dEPARTMENT eNCOUnter      Pt Name: Nicholas Stevenson  MRN: [id number]  [place] 1960  Date of evaluation: 2019  Provider: Janet Rodriguez MD  PCP: Kena Brothers MD      [address] [place] [address]       Chief Complaint   Patient presents with    Eye Problem     spray perfume in rt eye       HISTORY OFPRESENT ILLNESS   (Location/Symptom, Timing/Onset, Context/Setting, Quality, Duration, Modifying Factors,Severity)  Note limiting factors. Nicholas Stevenson is a 64 y.o. female presents with complaints that this morning she sprayed some perfume in her right eye she has no blurry vision she has a little bit of eye pain she rates it at a 4 out of 10    Nursing Notes were all reviewed and agreed with or any disagreements were addressed  in the HPI. REVIEW OF SYSTEMS    (2-9 systems for level 4, 10 or more for level 5)     Review of Systems    Positives and Pertinent negatives as per HPI. Except as noted above in the ROS, all other systems were reviewed andnegative.        PASTMEDICAL HISTORY     Past Medical History:   Diagnosis Date    GERD (gastroesophageal reflux disease)     Gestational diabetes     Hypertension     Morbid obesity due to excess calories ([facility] [address] 2016    Severe persistent asthma 2019    Vitamin D deficiency     Xerosis cutis          SURGICAL HISTORY       Past Surgical History:   Procedure Laterality Date     SECTION           CURRENT MEDICATIONS       Previous Medications    ALBUTEROL SULFATE  (90 BASE) MCG/ACT INHALER    Inhale 2 puffs into the lungs every 6 hours as needed for Wheezing    CANDESARTAN (ATACAND) 32 MG TABLET    Take 1 tablet by mouth daily    FLUTICASONE (FLONASE) 50 MCG/ACT NASAL SPRAY    2 sprays by Each Nare route daily    FLUTICASONE (FLONASE) 50 MCG/ACT NASAL SPRAY    1 spray by Nasal route daily    FLUTICASONE-SALMETEROL (ADVAIR DISKUS) 500-50 MCG/DOSE DISKUS INHALER    Inhale 1 puff into the lungs every 12 hours    HYDROCHLOROTHIAZIDE (HYDRODIURIL) 25 MG TABLET    Take 1 tablet by mouth every morning    HYDROCORTISONE 2.5 % CREAM    Apply to the back of the ears and to the edge of the scalp anteriorly 2-3 times a day. MELOXICAM (MOBIC) 15 MG TABLET    Take 1 tablet by mouth daily as needed for Pain With food    NAPROXEN (NAPROSYN) 500 MG TABLET    Take 1 tablet by mouth 2 times daily (with meals) DO NOT FILL UNTIL 02/18/2019    OMEPRAZOLE (PRILOSEC) 40 MG DELAYED RELEASE CAPSULE    Take 1 capsule by mouth every morning (before breakfast)       ALLERGIES     Chloroquine; Lisinopril; Mefloquine; and Quinolones    FAMILY HISTORY     History reviewed. No pertinent family history.        SOCIAL HISTORY       Social History     Socioeconomic History    Marital status:      Spouse name: None    Number of children: None    Years of education: None    Highest education level: None   Occupational History    None   Social Needs    Financial resource strain: None    Food insecurity:     Worry: None     Inability: None    Transportation needs:     Medical: None     Non-medical: None   Tobacco Use    Smoking status: Never Smoker    Smokeless tobacco: Never Used   Substance and Sexual Activity    Alcohol use: No     Alcohol/week: 0.0 standard drinks    Drug use: No    Sexual activity: Yes     Partners: Male   Lifestyle    Physical activity:     Days per week: None     Minutes per session: None    Stress: None   Relationships    Social connections:     Talks on phone: None     Gets together: None     Attends [employer] service: None     Active member of club or organization: None     Attends meetings of clubs or organizations: None     Relationship status: None    Intimate partner violence:     Fear of current or ex partner: None     Emotionally abused: None     Physically abused: None     Forced sexual activity: None   Other Topics Concern    None   Social History Narrative    None

## 2019-09-13 ENCOUNTER — OFFICE VISIT (OUTPATIENT)
Dept: PRIMARY CARE CLINIC | Age: 59
End: 2019-09-13
Payer: COMMERCIAL

## 2019-09-13 VITALS
DIASTOLIC BLOOD PRESSURE: 83 MMHG | SYSTOLIC BLOOD PRESSURE: 139 MMHG | HEART RATE: 76 BPM | WEIGHT: 238 LBS | BODY MASS INDEX: 40.63 KG/M2 | TEMPERATURE: 98.3 F | HEIGHT: 64 IN | OXYGEN SATURATION: 97 %

## 2019-09-13 DIAGNOSIS — R25.2 BILATERAL LEG CRAMPS: ICD-10-CM

## 2019-09-13 DIAGNOSIS — R05.9 COUGH: ICD-10-CM

## 2019-09-13 DIAGNOSIS — I10 ESSENTIAL HYPERTENSION: ICD-10-CM

## 2019-09-13 DIAGNOSIS — J45.50 SEVERE PERSISTENT ASTHMA, UNSPECIFIED WHETHER COMPLICATED: ICD-10-CM

## 2019-09-13 DIAGNOSIS — Z91.09 ENVIRONMENTAL ALLERGIES: ICD-10-CM

## 2019-09-13 DIAGNOSIS — R25.2 BILATERAL LEG CRAMPS: Primary | ICD-10-CM

## 2019-09-13 LAB
A/G RATIO: 1.4 (ref 1.1–2.2)
ALBUMIN SERPL-MCNC: 4.3 G/DL (ref 3.4–5)
ALP BLD-CCNC: 109 U/L (ref 40–129)
ALT SERPL-CCNC: 18 U/L (ref 10–40)
ANION GAP SERPL CALCULATED.3IONS-SCNC: 17 MMOL/L (ref 3–16)
AST SERPL-CCNC: 22 U/L (ref 15–37)
BILIRUB SERPL-MCNC: 0.3 MG/DL (ref 0–1)
BUN BLDV-MCNC: 16 MG/DL (ref 7–20)
CALCIUM SERPL-MCNC: 9.6 MG/DL (ref 8.3–10.6)
CHLORIDE BLD-SCNC: 101 MMOL/L (ref 99–110)
CO2: 23 MMOL/L (ref 21–32)
CREAT SERPL-MCNC: 0.8 MG/DL (ref 0.6–1.1)
GFR AFRICAN AMERICAN: >60
GFR NON-AFRICAN AMERICAN: >60
GLOBULIN: 3 G/DL
GLUCOSE BLD-MCNC: 125 MG/DL (ref 70–99)
MAGNESIUM: 1.9 MG/DL (ref 1.8–2.4)
POTASSIUM SERPL-SCNC: 4 MMOL/L (ref 3.5–5.1)
SODIUM BLD-SCNC: 141 MMOL/L (ref 136–145)
TOTAL PROTEIN: 7.3 G/DL (ref 6.4–8.2)
VITAMIN D 25-HYDROXY: 40 NG/ML

## 2019-09-13 PROCEDURE — 99213 OFFICE O/P EST LOW 20 MIN: CPT | Performed by: INTERNAL MEDICINE

## 2019-09-13 RX ORDER — FLUTICASONE PROPIONATE 50 MCG
SPRAY, SUSPENSION (ML) NASAL
Qty: 1 BOTTLE | Refills: 5 | Status: SHIPPED | OUTPATIENT
Start: 2019-09-13 | End: 2019-12-30

## 2019-09-13 RX ORDER — CANDESARTAN 32 MG/1
32 TABLET ORAL DAILY
Qty: 90 TABLET | Refills: 1 | Status: SHIPPED | OUTPATIENT
Start: 2019-09-13 | End: 2019-12-30 | Stop reason: SDUPTHER

## 2019-09-13 ASSESSMENT — ENCOUNTER SYMPTOMS: COUGH: 1

## 2019-09-14 NOTE — PROGRESS NOTES
Юлия Barahona  YOB: 1960    Date of Service:  9/13/2019    Chief Complaint   Patient presents with    Follow-Up from Hospital    Cough    Palpitations     Cough returned after resolving on the prednisone taper. Now has rhinitis and post nasal drip that may be precipitating the cough. Due to see pulmonary next week. Needs to follow up with Dr. Art Esqueda. Cough   This is a recurrent problem. The current episode started in the past 7 days. The problem has been waxing and waning. The problem occurs every few minutes. The cough is productive of sputum. Nothing aggravates the symptoms. She has tried oral steroids for the symptoms. The treatment provided significant relief. Her past medical history is significant for bronchitis. Palpitations    Chronicity: did not mention during the interview. Associated symptoms include coughing. Allergies   Allergen Reactions    Chloroquine     Lisinopril      Coughing and choking    Mefloquine     Quinolones      Outpatient Medications Marked as Taking for the 9/13/19 encounter (Office Visit) with Sharon Dumas MD   Medication Sig Dispense Refill    candesartan (ATACAND) 32 MG tablet Take 1 tablet by mouth daily 90 tablet 1    fluticasone (FLONASE) 50 MCG/ACT nasal spray 2 sprays in each nostril twice a day. 1 Bottle 5    tobramycin (TOBREX) 0.3 % ophthalmic solution Place 1 drop into the right eye every 4 hours for 10 days 1 Bottle 0    albuterol sulfate  (90 Base) MCG/ACT inhaler Inhale 2 puffs into the lungs every 6 hours as needed for Wheezing 1 Inhaler 6    fluticasone-salmeterol (ADVAIR DISKUS) 500-50 MCG/DOSE diskus inhaler Inhale 1 puff into the lungs every 12 hours 60 each 11    fluticasone (FLONASE) 50 MCG/ACT nasal spray 1 spray by Nasal route daily 1 Bottle 10    hydrocortisone 2.5 % cream Apply to the back of the ears and to the edge of the scalp anteriorly 2-3 times a day.  28 g 1    hydrochlorothiazide

## 2019-09-16 ENCOUNTER — HOSPITAL ENCOUNTER (OUTPATIENT)
Dept: PULMONOLOGY | Age: 59
Discharge: HOME | End: 2019-09-16
Payer: COMMERCIAL

## 2019-09-16 VITALS — OXYGEN SATURATION: 97 %

## 2019-09-16 PROCEDURE — 94070 EVALUATION OF WHEEZING: CPT

## 2019-09-16 PROCEDURE — 94726 PLETHYSMOGRAPHY LUNG VOLUMES: CPT

## 2019-09-16 PROCEDURE — 6360000002 HC RX W HCPCS: Performed by: INTERNAL MEDICINE

## 2019-09-16 PROCEDURE — 94664 DEMO&/EVAL PT USE INHALER: CPT

## 2019-09-16 PROCEDURE — 94729 DIFFUSING CAPACITY: CPT

## 2019-09-16 PROCEDURE — 94640 AIRWAY INHALATION TREATMENT: CPT

## 2019-09-16 PROCEDURE — 94010 BREATHING CAPACITY TEST: CPT

## 2019-09-16 PROCEDURE — 94760 N-INVAS EAR/PLS OXIMETRY 1: CPT

## 2019-09-16 RX ORDER — ALBUTEROL SULFATE 2.5 MG/3ML
2.5 SOLUTION RESPIRATORY (INHALATION) ONCE
Status: COMPLETED | OUTPATIENT
Start: 2019-09-16 | End: 2019-09-16

## 2019-09-16 RX ADMIN — ALBUTEROL SULFATE 2.5 MG: 2.5 SOLUTION RESPIRATORY (INHALATION) at 15:22

## 2019-09-16 RX ADMIN — METHACHOLINE CHLORIDE 100 MG: 100 POWDER, FOR SOLUTION RESPIRATORY (INHALATION) at 15:22

## 2019-09-17 ENCOUNTER — OFFICE VISIT (OUTPATIENT)
Dept: PULMONOLOGY | Age: 59
End: 2019-09-17
Payer: COMMERCIAL

## 2019-09-17 VITALS
HEIGHT: 64 IN | HEART RATE: 73 BPM | SYSTOLIC BLOOD PRESSURE: 128 MMHG | OXYGEN SATURATION: 98 % | BODY MASS INDEX: 40.8 KG/M2 | DIASTOLIC BLOOD PRESSURE: 80 MMHG | RESPIRATION RATE: 17 BRPM | WEIGHT: 239 LBS

## 2019-09-17 DIAGNOSIS — R05.9 COUGH: Primary | ICD-10-CM

## 2019-09-17 PROCEDURE — 99204 OFFICE O/P NEW MOD 45 MIN: CPT | Performed by: INTERNAL MEDICINE

## 2019-09-17 RX ORDER — MONTELUKAST SODIUM 10 MG/1
10 TABLET ORAL NIGHTLY
Qty: 30 TABLET | Refills: 3 | Status: SHIPPED | OUTPATIENT
Start: 2019-09-17 | End: 2019-12-30 | Stop reason: SDUPTHER

## 2019-09-17 NOTE — PROGRESS NOTES
obvious mass. No visible thyroid enlargement    Respiratory: Clear to auscultation bilaterally, no accessory muscle use  Cardiovascular: Regular rate and rhythm, no appreciable murmurs. No edema. Gastrointestinal: Soft, non-tender. No hernia  Skin: Warm and dry. No rashes or ulcers on visible areas. Normal texture and turgor  Lymphatic: No cervical LAD. No supraclavicular LAD. Musculoskeletal: No cyanosis, clubbing or joint deformity. Psychiatric: Normal mood and affect; exhibits normal insight and judgement     Data reviewed:  Pulmonary Function Testing (9/16/19)  FVC 1.98 L at 76% predicted ---> 1.9L at 74% predicted  FEV1 1.74 L at 85% predicted ----> 1.72L at 84% predicted  FEV1/FVC ratio at 88% ---->91% predicted  TLC 4.27 L at 88% predicted  DLCO 25.12 at 104% predicted    Methacholine challenge: Positive at 16mg/ml    Overall: Normal flow measurements without significant reversal; normal diffusing capacity; methacholine challenge shows borderline bronchial hyperreactivity    Images and reports of chest imaging were reviewed by me. My interpretation is:  CXR (7/14/19): Clear lung fields       Lab Results   Component Value Date    WBC 5.1 06/22/2019    HGB 13.8 06/22/2019    HCT 41.6 06/22/2019    MCV 85.9 06/22/2019     06/22/2019       No results found for: BNP    Lab Results   Component Value Date    CREATININE 0.8 09/13/2019    BUN 16 09/13/2019     09/13/2019    K 4.0 09/13/2019     09/13/2019    CO2 23 09/13/2019         Assessment/Plan:64y.o. year old female presents with chief complaint of chronic cough. Cough- PFTs are consistent with asthma. She will continue Advair 500/50mcg twice daily. We discussed that she may use albuterol inhaler as needed. Will begin Singulair nightly. She will return for follow-up in 2 months.       Anna Marie Raygoza MD  [facility] Pulmonology, Critical Care and Sleep

## 2019-10-13 PROBLEM — R05.9 COUGH: Status: RESOLVED | Noted: 2019-08-02 | Resolved: 2019-10-13

## 2019-10-18 ENCOUNTER — HOSPITAL ENCOUNTER (EMERGENCY)
Age: 59
Discharge: HOME | End: 2019-10-18
Attending: EMERGENCY MEDICINE
Payer: COMMERCIAL

## 2019-10-18 ENCOUNTER — APPOINTMENT (OUTPATIENT)
Dept: GENERAL RADIOLOGY | Age: 59
End: 2019-10-18
Payer: COMMERCIAL

## 2019-10-18 VITALS
OXYGEN SATURATION: 97 % | SYSTOLIC BLOOD PRESSURE: 141 MMHG | DIASTOLIC BLOOD PRESSURE: 78 MMHG | HEART RATE: 66 BPM | RESPIRATION RATE: 18 BRPM | BODY MASS INDEX: 40.9 KG/M2 | WEIGHT: 238.25 LBS | TEMPERATURE: 98.2 F

## 2019-10-18 DIAGNOSIS — R07.89 NON-CARDIAC CHEST PAIN: Primary | ICD-10-CM

## 2019-10-18 LAB
ANION GAP SERPL CALCULATED.3IONS-SCNC: 12 MMOL/L (ref 3–16)
BASOPHILS ABSOLUTE: 0.1 K/UL (ref 0–0.2)
BASOPHILS RELATIVE PERCENT: 1.2 %
BUN BLDV-MCNC: 18 MG/DL (ref 7–20)
CALCIUM SERPL-MCNC: 9 MG/DL (ref 8.3–10.6)
CHLORIDE BLD-SCNC: 106 MMOL/L (ref 99–110)
CO2: 24 MMOL/L (ref 21–32)
CREAT SERPL-MCNC: 0.7 MG/DL (ref 0.6–1.1)
EOSINOPHILS ABSOLUTE: 0.3 K/UL (ref 0–0.6)
EOSINOPHILS RELATIVE PERCENT: 5.8 %
GFR AFRICAN AMERICAN: >60
GFR NON-AFRICAN AMERICAN: >60
GLUCOSE BLD-MCNC: 106 MG/DL (ref 70–99)
HCT VFR BLD CALC: 39.9 % (ref 36–48)
HEMOGLOBIN: 13 G/DL (ref 12–16)
LYMPHOCYTES ABSOLUTE: 1.7 K/UL (ref 1–5.1)
LYMPHOCYTES RELATIVE PERCENT: 30 %
MCH RBC QN AUTO: 28.1 PG (ref 26–34)
MCHC RBC AUTO-ENTMCNC: 32.6 G/DL (ref 31–36)
MCV RBC AUTO: 86.1 FL (ref 80–100)
MONOCYTES ABSOLUTE: 0.6 K/UL (ref 0–1.3)
MONOCYTES RELATIVE PERCENT: 10.1 %
NEUTROPHILS ABSOLUTE: 3 K/UL (ref 1.7–7.7)
NEUTROPHILS RELATIVE PERCENT: 52.9 %
PDW BLD-RTO: 14.5 % (ref 12.4–15.4)
PLATELET # BLD: 217 K/UL (ref 135–450)
PMV BLD AUTO: 9.2 FL (ref 5–10.5)
POTASSIUM REFLEX MAGNESIUM: 3.9 MMOL/L (ref 3.5–5.1)
RBC # BLD: 4.64 M/UL (ref 4–5.2)
SODIUM BLD-SCNC: 142 MMOL/L (ref 136–145)
TROPONIN: <0.01 NG/ML
WBC # BLD: 5.7 K/UL (ref 4–11)

## 2019-10-18 PROCEDURE — 84484 ASSAY OF TROPONIN QUANT: CPT

## 2019-10-18 PROCEDURE — 99285 EMERGENCY DEPT VISIT HI MDM: CPT

## 2019-10-18 PROCEDURE — 71045 X-RAY EXAM CHEST 1 VIEW: CPT

## 2019-10-18 PROCEDURE — 80048 BASIC METABOLIC PNL TOTAL CA: CPT

## 2019-10-18 PROCEDURE — 85025 COMPLETE CBC W/AUTO DIFF WBC: CPT

## 2019-10-18 PROCEDURE — 93005 ELECTROCARDIOGRAM TRACING: CPT | Performed by: EMERGENCY MEDICINE

## 2019-10-18 RX ORDER — MELOXICAM 15 MG/1
15 TABLET ORAL DAILY PRN
Qty: 15 TABLET | Refills: 1 | Status: SHIPPED | OUTPATIENT
Start: 2019-10-18 | End: 2019-12-17 | Stop reason: CLARIF

## 2019-10-18 ASSESSMENT — PAIN DESCRIPTION - FREQUENCY: FREQUENCY: CONTINUOUS

## 2019-10-18 ASSESSMENT — PAIN DESCRIPTION - DESCRIPTORS: DESCRIPTORS: DISCOMFORT

## 2019-10-18 ASSESSMENT — PAIN SCALES - GENERAL
PAINLEVEL_OUTOF10: 1
PAINLEVEL_OUTOF10: 0

## 2019-10-18 ASSESSMENT — PAIN DESCRIPTION - ORIENTATION: ORIENTATION: RIGHT;LEFT;ANTERIOR

## 2019-10-18 ASSESSMENT — PAIN DESCRIPTION - LOCATION: LOCATION: CHEST

## 2019-10-18 ASSESSMENT — PAIN DESCRIPTION - ONSET: ONSET: ON-GOING

## 2019-10-21 LAB
EKG ATRIAL RATE: 78 BPM
EKG DIAGNOSIS: NORMAL
EKG P AXIS: 52 DEGREES
EKG P-R INTERVAL: 140 MS
EKG Q-T INTERVAL: 378 MS
EKG QRS DURATION: 80 MS
EKG QTC CALCULATION (BAZETT): 430 MS
EKG R AXIS: 8 DEGREES
EKG T AXIS: 18 DEGREES
EKG VENTRICULAR RATE: 78 BPM

## 2019-10-21 PROCEDURE — 93010 ELECTROCARDIOGRAM REPORT: CPT | Performed by: INTERNAL MEDICINE

## 2019-11-06 ENCOUNTER — APPOINTMENT (OUTPATIENT)
Dept: GENERAL RADIOLOGY | Age: 59
End: 2019-11-06
Payer: COMMERCIAL

## 2019-11-06 ENCOUNTER — HOSPITAL ENCOUNTER (EMERGENCY)
Age: 59
Discharge: HOME | End: 2019-11-06
Attending: EMERGENCY MEDICINE
Payer: COMMERCIAL

## 2019-11-06 VITALS
DIASTOLIC BLOOD PRESSURE: 93 MMHG | SYSTOLIC BLOOD PRESSURE: 159 MMHG | HEIGHT: 65 IN | BODY MASS INDEX: 39.65 KG/M2 | OXYGEN SATURATION: 99 % | RESPIRATION RATE: 18 BRPM | TEMPERATURE: 98.9 F | HEART RATE: 65 BPM | WEIGHT: 238 LBS

## 2019-11-06 DIAGNOSIS — S46.912A LEFT SHOULDER STRAIN, INITIAL ENCOUNTER: Primary | ICD-10-CM

## 2019-11-06 PROCEDURE — 6370000000 HC RX 637 (ALT 250 FOR IP): Performed by: EMERGENCY MEDICINE

## 2019-11-06 PROCEDURE — 73030 X-RAY EXAM OF SHOULDER: CPT

## 2019-11-06 PROCEDURE — 99283 EMERGENCY DEPT VISIT LOW MDM: CPT

## 2019-11-06 RX ORDER — CYCLOBENZAPRINE HCL 10 MG
10 TABLET ORAL ONCE
Status: COMPLETED | OUTPATIENT
Start: 2019-11-06 | End: 2019-11-06

## 2019-11-06 RX ORDER — CYCLOBENZAPRINE HCL 10 MG
10 TABLET ORAL 3 TIMES DAILY PRN
Qty: 15 TABLET | Refills: 0 | Status: SHIPPED | OUTPATIENT
Start: 2019-11-06 | End: 2019-11-11

## 2019-11-06 RX ORDER — LIDOCAINE 4 G/G
1 PATCH TOPICAL ONCE
Status: DISCONTINUED | OUTPATIENT
Start: 2019-11-06 | End: 2019-11-06 | Stop reason: HOSPADM

## 2019-11-06 RX ADMIN — CYCLOBENZAPRINE HYDROCHLORIDE 10 MG: 10 TABLET, FILM COATED ORAL at 20:21

## 2019-11-06 ASSESSMENT — PAIN SCALES - GENERAL: PAINLEVEL_OUTOF10: 8

## 2019-11-06 ASSESSMENT — PAIN DESCRIPTION - ORIENTATION: ORIENTATION: LEFT

## 2019-11-06 ASSESSMENT — PAIN DESCRIPTION - FREQUENCY: FREQUENCY: CONTINUOUS

## 2019-11-06 ASSESSMENT — PAIN DESCRIPTION - PAIN TYPE: TYPE: ACUTE PAIN

## 2019-11-06 ASSESSMENT — PAIN DESCRIPTION - LOCATION: LOCATION: SHOULDER

## 2019-11-06 ASSESSMENT — PAIN DESCRIPTION - DESCRIPTORS: DESCRIPTORS: CONSTANT

## 2019-12-17 ENCOUNTER — OFFICE VISIT (OUTPATIENT)
Dept: ORTHOPEDIC SURGERY | Age: 59
End: 2019-12-17
Payer: COMMERCIAL

## 2019-12-17 VITALS
DIASTOLIC BLOOD PRESSURE: 81 MMHG | SYSTOLIC BLOOD PRESSURE: 113 MMHG | HEIGHT: 65 IN | WEIGHT: 238.1 LBS | BODY MASS INDEX: 39.67 KG/M2 | HEART RATE: 69 BPM

## 2019-12-17 DIAGNOSIS — M25.561 RIGHT KNEE PAIN, UNSPECIFIED CHRONICITY: ICD-10-CM

## 2019-12-17 DIAGNOSIS — M79.672 HEEL PAIN, BILATERAL: ICD-10-CM

## 2019-12-17 DIAGNOSIS — M79.671 HEEL PAIN, BILATERAL: ICD-10-CM

## 2019-12-17 DIAGNOSIS — M17.11 PRIMARY OSTEOARTHRITIS OF RIGHT KNEE: Primary | ICD-10-CM

## 2019-12-17 PROCEDURE — 20610 DRAIN/INJ JOINT/BURSA W/O US: CPT | Performed by: ORTHOPAEDIC SURGERY

## 2019-12-17 PROCEDURE — 99203 OFFICE O/P NEW LOW 30 MIN: CPT | Performed by: ORTHOPAEDIC SURGERY

## 2019-12-17 RX ORDER — METHYLPREDNISOLONE ACETATE 40 MG/ML
40 INJECTION, SUSPENSION INTRA-ARTICULAR; INTRALESIONAL; INTRAMUSCULAR; SOFT TISSUE ONCE
Status: COMPLETED | OUTPATIENT
Start: 2019-12-17 | End: 2019-12-17

## 2019-12-17 RX ADMIN — METHYLPREDNISOLONE ACETATE 40 MG: 40 INJECTION, SUSPENSION INTRA-ARTICULAR; INTRALESIONAL; INTRAMUSCULAR; SOFT TISSUE at 18:01

## 2019-12-17 ASSESSMENT — ENCOUNTER SYMPTOMS
GASTROINTESTINAL NEGATIVE: 1
EYES NEGATIVE: 1
ALLERGIC/IMMUNOLOGIC NEGATIVE: 1

## 2019-12-30 ENCOUNTER — OFFICE VISIT (OUTPATIENT)
Dept: PRIMARY CARE CLINIC | Age: 59
End: 2019-12-30
Payer: COMMERCIAL

## 2019-12-30 VITALS
SYSTOLIC BLOOD PRESSURE: 160 MMHG | OXYGEN SATURATION: 98 % | TEMPERATURE: 98.7 F | HEART RATE: 78 BPM | DIASTOLIC BLOOD PRESSURE: 90 MMHG | HEIGHT: 65 IN | WEIGHT: 248 LBS | BODY MASS INDEX: 41.32 KG/M2

## 2019-12-30 DIAGNOSIS — Z23 NEED FOR PNEUMOCOCCAL VACCINATION: ICD-10-CM

## 2019-12-30 DIAGNOSIS — R63.8 WEIGHT DISORDER: ICD-10-CM

## 2019-12-30 DIAGNOSIS — Z23 NEED FOR SHINGLES VACCINE: ICD-10-CM

## 2019-12-30 DIAGNOSIS — Z00.00 PHYSICAL EXAM: ICD-10-CM

## 2019-12-30 DIAGNOSIS — J45.30 MILD PERSISTENT ASTHMA WITHOUT COMPLICATION: ICD-10-CM

## 2019-12-30 DIAGNOSIS — Z23 FLU VACCINE NEED: ICD-10-CM

## 2019-12-30 DIAGNOSIS — I10 ESSENTIAL HYPERTENSION: ICD-10-CM

## 2019-12-30 LAB
A/G RATIO: 1.4 (ref 1.1–2.2)
ALBUMIN SERPL-MCNC: 4.2 G/DL (ref 3.4–5)
ALP BLD-CCNC: 105 U/L (ref 40–129)
ALT SERPL-CCNC: 21 U/L (ref 10–40)
ANION GAP SERPL CALCULATED.3IONS-SCNC: 17 MMOL/L (ref 3–16)
AST SERPL-CCNC: 29 U/L (ref 15–37)
BASOPHILS ABSOLUTE: 0 K/UL (ref 0–0.2)
BASOPHILS RELATIVE PERCENT: 0.6 %
BILIRUB SERPL-MCNC: 0.3 MG/DL (ref 0–1)
BUN BLDV-MCNC: 22 MG/DL (ref 7–20)
CALCIUM SERPL-MCNC: 9.5 MG/DL (ref 8.3–10.6)
CHLORIDE BLD-SCNC: 102 MMOL/L (ref 99–110)
CHOLESTEROL, TOTAL: 201 MG/DL (ref 0–199)
CO2: 25 MMOL/L (ref 21–32)
CREAT SERPL-MCNC: 0.8 MG/DL (ref 0.6–1.1)
EOSINOPHILS ABSOLUTE: 0.1 K/UL (ref 0–0.6)
EOSINOPHILS RELATIVE PERCENT: 2.8 %
GFR AFRICAN AMERICAN: >60
GFR NON-AFRICAN AMERICAN: >60
GLOBULIN: 3.1 G/DL
GLUCOSE BLD-MCNC: 101 MG/DL (ref 70–99)
HCT VFR BLD CALC: 40.8 % (ref 36–48)
HDLC SERPL-MCNC: 64 MG/DL (ref 40–60)
HEMOGLOBIN: 13.6 G/DL (ref 12–16)
LDL CHOLESTEROL CALCULATED: 123 MG/DL
LYMPHOCYTES ABSOLUTE: 1.4 K/UL (ref 1–5.1)
LYMPHOCYTES RELATIVE PERCENT: 29.5 %
MCH RBC QN AUTO: 28.6 PG (ref 26–34)
MCHC RBC AUTO-ENTMCNC: 33.3 G/DL (ref 31–36)
MCV RBC AUTO: 85.8 FL (ref 80–100)
MONOCYTES ABSOLUTE: 0.4 K/UL (ref 0–1.3)
MONOCYTES RELATIVE PERCENT: 9 %
NEUTROPHILS ABSOLUTE: 2.8 K/UL (ref 1.7–7.7)
NEUTROPHILS RELATIVE PERCENT: 58.1 %
PDW BLD-RTO: 14.2 % (ref 12.4–15.4)
PLATELET # BLD: 192 K/UL (ref 135–450)
PMV BLD AUTO: 9.7 FL (ref 5–10.5)
POTASSIUM SERPL-SCNC: 4.4 MMOL/L (ref 3.5–5.1)
RBC # BLD: 4.76 M/UL (ref 4–5.2)
SODIUM BLD-SCNC: 144 MMOL/L (ref 136–145)
TOTAL PROTEIN: 7.3 G/DL (ref 6.4–8.2)
TRIGL SERPL-MCNC: 72 MG/DL (ref 0–150)
TSH SERPL DL<=0.05 MIU/L-ACNC: 1.52 UIU/ML (ref 0.27–4.2)
VLDLC SERPL CALC-MCNC: 14 MG/DL
WBC # BLD: 4.9 K/UL (ref 4–11)

## 2019-12-30 PROCEDURE — 90472 IMMUNIZATION ADMIN EACH ADD: CPT | Performed by: INTERNAL MEDICINE

## 2019-12-30 PROCEDURE — 99396 PREV VISIT EST AGE 40-64: CPT | Performed by: INTERNAL MEDICINE

## 2019-12-30 PROCEDURE — 90471 IMMUNIZATION ADMIN: CPT | Performed by: INTERNAL MEDICINE

## 2019-12-30 PROCEDURE — 90732 PPSV23 VACC 2 YRS+ SUBQ/IM: CPT | Performed by: INTERNAL MEDICINE

## 2019-12-30 PROCEDURE — 90686 IIV4 VACC NO PRSV 0.5 ML IM: CPT | Performed by: INTERNAL MEDICINE

## 2019-12-30 RX ORDER — MONTELUKAST SODIUM 10 MG/1
10 TABLET ORAL NIGHTLY
Qty: 90 TABLET | Refills: 3 | Status: SHIPPED | OUTPATIENT
Start: 2019-12-30 | End: 2020-12-17 | Stop reason: SDUPTHER

## 2019-12-30 RX ORDER — HYDROCHLOROTHIAZIDE 25 MG/1
25 TABLET ORAL EVERY MORNING
Qty: 90 TABLET | Refills: 5 | Status: SHIPPED | OUTPATIENT
Start: 2019-12-30 | End: 2020-11-10

## 2019-12-30 RX ORDER — CANDESARTAN 32 MG/1
32 TABLET ORAL DAILY
Qty: 90 TABLET | Refills: 5 | Status: SHIPPED | OUTPATIENT
Start: 2019-12-30 | End: 2020-12-31

## 2019-12-30 RX ORDER — ALBUTEROL SULFATE 90 UG/1
2 AEROSOL, METERED RESPIRATORY (INHALATION) EVERY 4 HOURS PRN
Qty: 1 INHALER | Refills: 6 | Status: SHIPPED | OUTPATIENT
Start: 2019-12-30 | End: 2020-12-31 | Stop reason: SDUPTHER

## 2019-12-30 ASSESSMENT — ENCOUNTER SYMPTOMS
SHORTNESS OF BREATH: 0
ABDOMINAL PAIN: 0
WHEEZING: 0
CONSTIPATION: 0
CHEST TIGHTNESS: 0
DIARRHEA: 0
COUGH: 0
ABDOMINAL DISTENTION: 0
BLOOD IN STOOL: 0
GASTROINTESTINAL NEGATIVE: 1
EYES NEGATIVE: 1

## 2019-12-31 LAB
ESTIMATED AVERAGE GLUCOSE: 111.2 MG/DL
HBA1C MFR BLD: 5.5 %

## 2020-01-14 ENCOUNTER — APPOINTMENT (OUTPATIENT)
Dept: GENERAL RADIOLOGY | Age: 60
End: 2020-01-14
Payer: COMMERCIAL

## 2020-01-14 ENCOUNTER — HOSPITAL ENCOUNTER (EMERGENCY)
Age: 60
Discharge: HOME | End: 2020-01-14
Attending: EMERGENCY MEDICINE
Payer: COMMERCIAL

## 2020-01-14 VITALS
RESPIRATION RATE: 16 BRPM | TEMPERATURE: 98.7 F | DIASTOLIC BLOOD PRESSURE: 71 MMHG | BODY MASS INDEX: 40.37 KG/M2 | WEIGHT: 242.6 LBS | OXYGEN SATURATION: 97 % | SYSTOLIC BLOOD PRESSURE: 128 MMHG | HEART RATE: 80 BPM

## 2020-01-14 PROCEDURE — 73600 X-RAY EXAM OF ANKLE: CPT

## 2020-01-14 PROCEDURE — 99283 EMERGENCY DEPT VISIT LOW MDM: CPT

## 2020-01-14 PROCEDURE — 73650 X-RAY EXAM OF HEEL: CPT

## 2020-01-14 PROCEDURE — 6370000000 HC RX 637 (ALT 250 FOR IP): Performed by: EMERGENCY MEDICINE

## 2020-01-14 RX ORDER — ACETAMINOPHEN 325 MG/1
650 TABLET ORAL ONCE
Status: COMPLETED | OUTPATIENT
Start: 2020-01-14 | End: 2020-01-14

## 2020-01-14 RX ADMIN — ACETAMINOPHEN 650 MG: 325 TABLET ORAL at 19:14

## 2020-01-14 ASSESSMENT — PAIN DESCRIPTION - PAIN TYPE: TYPE: ACUTE PAIN

## 2020-01-14 ASSESSMENT — PAIN SCALES - GENERAL
PAINLEVEL_OUTOF10: 8
PAINLEVEL_OUTOF10: 8
PAINLEVEL_OUTOF10: 6

## 2020-01-14 ASSESSMENT — PAIN DESCRIPTION - ORIENTATION: ORIENTATION: LEFT

## 2020-01-15 NOTE — ED PROVIDER NOTES
[id number]  [phone]    Schedule an appointment as soon as possible for a visit         DISCHARGE MEDICATIONS:  New Prescriptions    No medications on file     Controlled Substances Monitoring:     No flowsheet data found.     (Please note that portions of this note were completed with a voice recognition program.  Efforts were made to edit the dictations but occasionally words are mis-transcribed.)    Brandon Blackwood MD (electronically signed)  Attending Emergency Physician            Emory Pearce MD  01/14/20 [id number]

## 2020-02-05 ENCOUNTER — HOSPITAL ENCOUNTER (OUTPATIENT)
Dept: MAMMOGRAPHY | Age: 60
Discharge: HOME | End: 2020-02-05
Payer: COMMERCIAL

## 2020-02-05 ENCOUNTER — OFFICE VISIT (OUTPATIENT)
Dept: PULMONOLOGY | Age: 60
End: 2020-02-05
Payer: COMMERCIAL

## 2020-02-05 VITALS
OXYGEN SATURATION: 97 % | WEIGHT: 247 LBS | HEART RATE: 88 BPM | DIASTOLIC BLOOD PRESSURE: 82 MMHG | TEMPERATURE: 98.7 F | RESPIRATION RATE: 14 BRPM | HEIGHT: 65 IN | SYSTOLIC BLOOD PRESSURE: 120 MMHG | BODY MASS INDEX: 41.15 KG/M2

## 2020-02-05 DIAGNOSIS — J45.20 MILD INTERMITTENT ASTHMA WITHOUT COMPLICATION: ICD-10-CM

## 2020-02-05 PROCEDURE — 99214 OFFICE O/P EST MOD 30 MIN: CPT | Performed by: INTERNAL MEDICINE

## 2020-02-05 PROCEDURE — 77063 BREAST TOMOSYNTHESIS BI: CPT

## 2020-02-05 RX ORDER — IPRATROPIUM BROMIDE 21 UG/1
2 SPRAY, METERED NASAL 2 TIMES DAILY
Qty: 1 BOTTLE | Refills: 3 | Status: SHIPPED | OUTPATIENT
Start: 2020-02-05 | End: 2020-07-01

## 2020-02-05 RX ORDER — AZELASTINE HYDROCHLORIDE, FLUTICASONE PROPIONATE 137; 50 UG/1; UG/1
1 SPRAY, METERED NASAL 2 TIMES DAILY
Qty: 1 BOTTLE | Refills: 2 | Status: SHIPPED | OUTPATIENT
Start: 2020-02-05 | End: 2020-04-27 | Stop reason: SDUPTHER

## 2020-02-05 NOTE — PATIENT INSTRUCTIONS
Please obtain blood test  Please use Dymista twice daily instead of Flonase.  If not please use Ipratropium and Flonase together     Please come for a follow-up visit in 6 months

## 2020-02-05 NOTE — PROGRESS NOTES
BUN 22 (H) 12/30/2019     12/30/2019    K 4.4 12/30/2019     12/30/2019    CO2 25 12/30/2019         Assessment/Plan:64y.o. year old female presents for follow up. Asthma- Mild intermittent. We discussed that she may continue off Advair. Continue albuterol inhaler as needed. Will obtain allergy panel and IgE. Cough- Due to asthma. Continue Singulair nightly. Post-nasal drainage- Will try to get Dymista. If this is not covered by her insurance I have recommended Flonase and Ipratropium nasal sprays. She will return for follow-up in 6 months.       Mela Rios MD  [facility] Pulmonology, Critical Care and Sleep

## 2020-02-06 ENCOUNTER — OFFICE VISIT (OUTPATIENT)
Dept: ORTHOPEDIC SURGERY | Age: 60
End: 2020-02-06
Payer: COMMERCIAL

## 2020-02-06 VITALS
DIASTOLIC BLOOD PRESSURE: 86 MMHG | BODY MASS INDEX: 41.14 KG/M2 | HEIGHT: 65 IN | SYSTOLIC BLOOD PRESSURE: 126 MMHG | WEIGHT: 246.91 LBS | HEART RATE: 84 BPM

## 2020-02-06 PROCEDURE — 99213 OFFICE O/P EST LOW 20 MIN: CPT | Performed by: ORTHOPAEDIC SURGERY

## 2020-02-07 NOTE — PROGRESS NOTES
12 [facility]  History and Physical  Knee Pain    Date:  2020    Name:  Lawrence Watson  Address:  [address] [address]    :  1960      Age:   64 y.o.    SSN:  [id number]      Medical Record Number:  <[id number]>      Chief Complaint    Follow-up (right knee )      History of Present Illness:  Lawrence Watson is a 64 y.o. female who presents for evaluation of right knee pain. This patient is known to [employer] sports medicine and is being treated with conservative therapy for the osteoarthritis in her right knee. She was last seen on 2019 and had a corticosteroid injection administered in the right knee. Patient states this injection gave her approximately a month and 1/2 to 2 months of relief. She does notice that over the past 4 weeks she has had an insidious onset of pain within the popliteal fossa of her right knee. She states it has become swollen and gets worse with activity. She denies any injury that she can associate with the worsening of her right knee pain and does note that she had a similar incident in the past and had a negative DVT ultrasound but it was positive for a Baker's cyst.  She has not attempted any treatment for this condition other than a topical cooling spray. The patient notes clicking, popping, catching denies any giving way, joint locking, numbness, paresthesias, or weakness. Pain Assessment  Location of Pain: Knee  Location Modifiers: Right  Severity of Pain: 10  Quality of Pain: Sharp, Aching  Duration of Pain: A few days  Frequency of Pain: Constant  Aggravating Factors:  Other (Comment)(sitting)  Relieving Factors: Nsaids  Result of Injury: No  Work-Related Injury: No  Are there other pain locations you wish to document?: No    Past Medical History:   Diagnosis Date    GERD (gastroesophageal reflux disease)     Gestational diabetes     Hypertension     Morbid obesity due to excess calories ([address] 2016    Severe persistent asthma 2019    Vitamin D deficiency     Xerosis cutis         Past Surgical History:   Procedure Laterality Date     SECTION  2004       No family history on file.     Social History     Socioeconomic History    Marital status:      Spouse name: None    Number of children: None    Years of education: None    Highest education level: None   Occupational History    None   Social Needs    Financial resource strain: None    Food insecurity:     Worry: None     Inability: None    Transportation needs:     Medical: None     Non-medical: None   Tobacco Use    Smoking status: Never Smoker    Smokeless tobacco: Never Used   Substance and Sexual Activity    Alcohol use: No     Alcohol/week: 0.0 standard drinks    Drug use: No    Sexual activity: Yes     Partners: Male   Lifestyle    Physical activity:     Days per week: None     Minutes per session: None    Stress: None   Relationships    Social connections:     Talks on phone: None     Gets together: None     Attends [employer] service: None     Active member of club or organization: None     Attends meetings of clubs or organizations: None     Relationship status: None    Intimate partner violence:     Fear of current or ex partner: None     Emotionally abused: None     Physically abused: None     Forced sexual activity: None   Other Topics Concern    None   Social History Narrative    None       Current Outpatient Medications   Medication Sig Dispense Refill    Azelastine-Fluticasone (DYMISTA) 137-50 MCG/ACT SUSP 1 spray by Nasal route 2 times daily 1 Bottle 2    ipratropium (ATROVENT) 0.03 % nasal spray 2 sprays by Nasal route 2 times daily 1 Bottle 3    candesartan (ATACAND) 32 MG tablet Take 1 tablet by mouth daily 90 tablet 5    hydrochlorothiazide (HYDRODIURIL) 25 MG tablet Take 1 tablet by mouth every morning 90 tablet 5    montelukast (SINGULAIR) 10 MG tablet Take 1 tablet by mouth nightly 90 tablet 3    albuterol sulfate  (90 Base) MCG/ACT inhaler Inhale 2 puffs into the lungs every 4 hours as needed for Wheezing 1 Inhaler 6    fluticasone-salmeterol (ADVAIR DISKUS) 500-50 MCG/DOSE diskus inhaler Inhale 1 puff into the lungs every 12 hours 60 each 11     No current facility-administered medications for this visit. Allergies   Allergen Reactions    Chloroquine     Lisinopril      Coughing and choking    Mefloquine     Quinolones          Review of Systems:  A 14 point review of systems was completed by the patient and is available in the media section of the scanned medical record and was reviewed on 2/7/2020. The review is negative with the exception of those things mentioned in the HPI and Past Medical History   Review of Systems   Musculoskeletal: Positive for arthralgias and myalgias. Neurological: Negative for weakness and numbness. Vital Signs:   /86   Pulse 84   Ht 5' 5\" (1.651 m)   Wt 246 lb 14.6 oz (112 kg)   LMP 10/10/2014   BMI 41.09 kg/m²     General Exam:    General: Clemente Maldonado is a healthy and well appearing 64y.o. year old female who is sitting comfortably in our office in no acute distress. Neuro: Alert & Oriented x 3,  normal,  no focal deficits noted. Normal mood & affect. Eyes: Sclera clear  Ears: Normal external ear  Mouth:  No perioral lesions  Pulm: Respirations unlabored and regular   Skin: Warm, well perfused      Knee Examination: Right    Inspection:   No effusion, ecchymosis, discoloration, erythema, excessive warmth. no gross deformities, no signs of infection. Right calf measures 47 cm in diameter. Palpation: There is mild patellofemoral crepitus, there is medial joint line tenderness. Tenderness of the popliteal fossa and medial gastroc insertion. No other osseous or soft tissue tenderness around the knee. Range of Motion:  0-130 degrees.   No increased calf tenderness with dorsiflexion    Special Tests:  No ligament instability,  Negative Homans sign    Gait:  Steady, Non antalgic, without the use of assistive devices    Neuro: Sensation equal bilateral lower extremities    Vascular: 2+ posterior tibialis pulse      Contralateral Knee Comparison Examination: Left    Inspection:  No effusion, ecchymosis, discoloration, erythema, excessive warmth. no gross deformities, no signs of infection. Left calf measures 47 cm in diameter    Palpation: There is mild patellofemoral crepitus, there is no joint line tenderness. No other osseous or soft tissue tenderness around the knee. Negative calf tenderness    Range of Motion:   0-130 degrees    Special Tests: No ligament instability, Negative Homans sign    Gait:  Steady, Non antalgic, without the use of assistive devices    Neuro: Sensation equal bilateral lower extremities    Vascular: 2+ posterior tibialis pulse        Assessment: Patient is a 64 y.o. female presenting to the office for evaluation of right knee pain    Visit Diagnoses       Codes    Primary osteoarthritis of right knee    -  Primary M17.11    Right knee pain, unspecified chronicity     M25.561            Medical decision making:  Patient's workup and evaluation were reviewed with the patient in the office today. Given the patient's history and physical exam there is some concern for DVT versus Baker's cyst.  Patient denies past medical history of a DVT and has had similar symptoms in the past with a negative DVT ultrasound. She does note that she had a Baker's cyst in the past but exhibited similar symptoms. For this reason we will ordering a DVT ultrasound of the right lower extremity for DVT versus Baker's cyst.  In addition, the patient is exhibiting worsening symptoms in the medial compartment therefore we believe the patient warrants an MRI of the right knee to evaluate for chondral damage as well as a right medial meniscus tear. All the patient's questions were answered while in the clinic. and treatment options and have  reviewed and agree with the past medical, family and social history unless otherwise noted. All of the patient's questions were answered.       Board Certified Orthopaedic Surgeon  [facility] and [address] and [address] and [employer]  Professor of [address]

## 2020-02-10 ENCOUNTER — TELEPHONE (OUTPATIENT)
Dept: ORTHOPEDIC SURGERY | Age: 60
End: 2020-02-10

## 2020-02-10 LAB
ALLERGEN ASPERGILLUS FUMIGATUS IGE: <0.1 KU/L
ALLERGEN BERMUDA GRASS IGE: <0.1 KU/L
ALLERGEN BIRCH IGE: <0.1 KU/L
ALLERGEN CAT DANDER IGE: <0.1 KU/L
ALLERGEN COMMON SHORT RAGWEED IGE: <0.1 KU/L
ALLERGEN COTTONWOOD: <0.1 KU/L
ALLERGEN DOG DANDER IGE: <0.1 KU/L
ALLERGEN ELM IGE: <0.1 KU/L
ALLERGEN FUNGI/MOLD M.RACEMOSUS IGE: <0.1 KU/L
ALLERGEN GERMAN COCKROACH IGE: <0.1 KU/L
ALLERGEN HORMODENDRUM HORDEI IGE: <0.1 KU/L
ALLERGEN MAPLE/BOX ELDER IGE: <0.1 KU/L
ALLERGEN MITE DUST FARINAE IGE: <0.1 KU/L
ALLERGEN MITE DUST PTERONYSSINUS IGE: <0.1 KU/L
ALLERGEN MOUNTAIN CEDAR: <0.1 KU/L
ALLERGEN MOUSE EPITHELIA IGE: <0.1 KU/L
ALLERGEN OAK TREE IGE: <0.1 KU/L
ALLERGEN PECAN TREE IGE: <0.1 KU/L
ALLERGEN PENICILLIUM NOTATUM: <0.1 KU/L
ALLERGEN ROUGH PIGWEED (W14) IGE: <0.1 KU/L
ALLERGEN RUSSIAN THISTLE IGE: <0.1 KU/L
ALLERGEN SEE NOTE: NORMAL
ALLERGEN SHEEP SORREL (W18) IGE: <0.1 KU/L
ALLERGEN TIMOTHY GRASS: <0.1 KU/L
ALLERGEN TREE SYCAMORE: <0.1 KU/L
ALLERGEN WALNUT TREE IGE: <0.1 KU/L
ALLERGEN WHITE MULBERRY TREE, IGE: <0.1 KU/L
ALLERGEN, TREE, WHITE ASH IGE: <0.1 KU/L
IGE: 31 KU/L

## 2020-02-11 ENCOUNTER — TELEPHONE (OUTPATIENT)
Dept: ORTHOPEDIC SURGERY | Age: 60
End: 2020-02-11

## 2020-02-18 ENCOUNTER — OFFICE VISIT (OUTPATIENT)
Dept: ORTHOPEDIC SURGERY | Age: 60
End: 2020-02-18
Payer: COMMERCIAL

## 2020-02-18 VITALS
WEIGHT: 246.91 LBS | HEIGHT: 65 IN | HEART RATE: 74 BPM | SYSTOLIC BLOOD PRESSURE: 126 MMHG | BODY MASS INDEX: 41.14 KG/M2 | DIASTOLIC BLOOD PRESSURE: 84 MMHG

## 2020-02-18 PROCEDURE — 99214 OFFICE O/P EST MOD 30 MIN: CPT | Performed by: ORTHOPAEDIC SURGERY

## 2020-02-18 NOTE — PROGRESS NOTES
Chief Complaint    Follow-up (right knee, MRI results )      History of Present Illness:  Юлия Barahona is a very pleasant 64 y.o. female who presents for follow up regarding right knee pain with an MRI. She has been treated conservatively for the osteoarthritis in her right knee and was last seen on 2020 when she described 4 weeks of insidious onset of pain within the popliteal fossa of her right knee which becomes swollen and gets worse with activity. She continues to have the same symptoms described two weeks ago but has not yet gone for her Doppler ultrasound for evaluation of DVT yet, despite our referral during the last appointment. Her appointment for the Doppler is tomorrow. Regarding her right knee osteoarthritis, her only management at this point has been a topical over-the-counter cooling spray which improves her pain subtly. Aside from the patient's posteriorly located pain in the popliteal fossa, her knee pain is also localized to the medial aspect at the level of her joint line. Pain Assessment:  Location: right  Level: Moderate  Duration: Months  Description: Sharp and aching  Result of an injury: No  Work related injury: No  Aggravating actions: walking  Relieving Factors: rest     Past Medical History:   Diagnosis Date    GERD (gastroesophageal reflux disease)     Gestational diabetes     Hypertension     Morbid obesity due to excess calories ([address] 2016    Severe persistent asthma 2019    Vitamin D deficiency     Xerosis cutis         Past Surgical History:   Procedure Laterality Date     SECTION         No family history on file.     Social History     Socioeconomic History    Marital status:      Spouse name: None    Number of children: None    Years of education: None    Highest education level: None   Occupational History    None   Social Needs    Financial resource strain: None    Food insecurity:     Worry: None     Inability: None    and Medical Director  [facility]

## 2020-02-19 ENCOUNTER — HOSPITAL ENCOUNTER (OUTPATIENT)
Dept: VASCULAR LAB | Age: 60
Discharge: HOME | End: 2020-02-19
Payer: COMMERCIAL

## 2020-02-19 PROCEDURE — 93971 EXTREMITY STUDY: CPT

## 2020-02-24 ENCOUNTER — HOSPITAL ENCOUNTER (OUTPATIENT)
Dept: PHYSICAL THERAPY | Age: 60
Setting detail: THERAPIES SERIES
Discharge: HOME | End: 2020-02-24
Payer: COMMERCIAL

## 2020-03-02 ENCOUNTER — HOSPITAL ENCOUNTER (OUTPATIENT)
Dept: PHYSICAL THERAPY | Age: 60
Setting detail: THERAPIES SERIES
Discharge: HOME | End: 2020-03-02
Payer: COMMERCIAL

## 2020-03-02 PROCEDURE — 97162 PT EVAL MOD COMPLEX 30 MIN: CPT

## 2020-03-02 PROCEDURE — 97110 THERAPEUTIC EXERCISES: CPT

## 2020-03-02 PROCEDURE — 97112 NEUROMUSCULAR REEDUCATION: CPT

## 2020-03-02 NOTE — PLAN OF CARE
macerated meniscal body with pseudo-extrusion, diffuse grade 4 medial tibiofemoral chondromalacia with multifocal penetrating osteochondral erosions and stress/insufficiency related osteoedema, Kellgren Jose 3-4 osteoarthritis. 2) Kellgren Jose 1-2 lateral compartment osteoarthritis and diffuse grade II-III chondromalacia  3) mild patellofemoral osteoarthritis  4) partial prepatellar plate delamination with low-grade prepatellar bursitis, and superficial soft tissue inflammation. She reports also having a doppler performed that was negative for superficial or deep vein thrombosis. She states that she was told to take pain medication as needed but did not receive an injection or a brace. She states that she had an injection in the past that helped a little but has not lasted.       Relevant Medical History: None  Functional Disability Index: WOMAC: (66/96) 68.75%     Pain Scale: 2-5/10  Easing factors: Resting  Provocative factors: transitions; bending knee; ascending/descending stairs    Type: [x]Constant   []Intermittent  []Radiating []Localized []other:     Numbness/Tingling: Along R lateral thigh    Occupation/School: Teacher     Living Status/Prior Level of Function: Independent with ADLs and IADLs,    OBJECTIVE:      Flexibility  3/2 L R Comment   Hamstring  Mild restriction     Gastroc  Moderate restriction    ITB      Quad               ROM  3/2 PROM AROM Overpressure Comment    L R L R L R    Flexion   117 95 + pain present      Extension   0 0                              Strength  3/2 L R Comment   Quad 5 4+    Hamstring 5 4+ pain present    Gastroc      Hip  flexion 5 3+ pain present    Hip abd 3+ 3+ pain present                    Special Test  3/2 Results/Comment   Meniscal Click    Crepitus positive   Flexion Test    Valgus Laxity    Varus Laxity    Lachmans    Drop Back    Homans negative         Reflexes/Sensation:  3/2   [x]Dermatomes/Myotomes intact    [x]Reflexes equal and normal bilaterally   []Other:    Joint mobility: 3/2   [x]Normal    []Hypo   []Hyper    Palpation: TTP along medial and lateral joint line; TTP popliteal fossa region  3/2    Functional Mobility/Transfers: Independent with increased pain; Pain with transitioning to standing after sitting for greater than 40 minutes; unable to ascend/descend stairs in a normalized reciprocal gait pattern; Pain with ambulating over an hour   3/2    Posture: Forward head and rounded shoulders  3/2    Gait: (include devices/WB status) Slight antalgic present   3/2                       [x] Patient history, allergies, meds reviewed. Medical chart reviewed. See intake form. 3/2    Review Of Systems (ROS):  [x]Performed Review of systems (Integumentary, CardioPulmonary, Neurological) by intake and observation. Intake form has been scanned into medical record. Patient has been instructed to contact their primary care physician regarding ROS issues if not already being addressed at this time.   3/2    Co-morbidities/Complexities (which will affect course of rehabilitation):   []None           Arthritic conditions   []Rheumatoid arthritis (M05.9)  []Osteoarthritis (M19.91)   Cardiovascular conditions   [x]Hypertension (I10)  []Hyperlipidemia (E78.5)  []Angina pectoris (I20)  []Atherosclerosis (I70)   Musculoskeletal conditions   []Disc pathology   []Congenital spine pathologies   []Prior surgical intervention  []Osteoporosis (M81.8)  []Osteopenia (M85.8)   Endocrine conditions   []Hypothyroid (E03.9)  []Hyperthyroid Gastrointestinal conditions   []Constipation (X27.68)   Metabolic conditions   []Morbid obesity (E66.01)  []Diabetes type 1(E10.65) or 2 (E11.65)   []Neuropathy (G60.9)     Pulmonary conditions   []Asthma (J45)  []Coughing   []COPD (J44.9)   Psychological Disorders  []Anxiety (F41.9)  []Depression (F32.9)   []Other:   []Other:          Barriers to/and or personal factors that will affect rehab potential:              [x]Age  [x]Sex [x]Motivation/Lack of Motivation                        []Co-Morbidities              []Cognitive Function, education/learning barriers              []Environmental, home barriers              []profession/work barriers  [x]past PT/medical experience  []other:  Justification:    Falls Risk Assessment (30 days):   [x] Falls Risk assessed and no intervention required. [] Falls Risk assessed and Patient requires intervention due to being higher risk   TUG score (>12s at risk):     [] Falls education provided, including       G-Codes:   WOMAC: 68.75%     ASSESSMENT:   Functional Impairments:     []Noted lumbar/proximal hip/LE hypomobility   [x]Decreased LE functional ROM   [x]Decreased core/proximal hip strength and neuromuscular control   [x]Decreased LE functional strength   [x]Reduced balance/proprioceptive control   []other:      Functional Activity Limitations (from functional questionnaire and intake)   [x]Reduced ability to tolerate prolonged functional positions   [x]Reduced ability or difficulty with changes of positions or transfers between positions   []Reduced ability to maintain good posture and demonstrate good body mechanics with sitting, bending, and lifting   []Reduced ability to sleep   [] Reduced ability or tolerance with driving and/or computer work   [x]Reduced ability to perform lifting, carrying tasks   [x]Reduced ability to squat   []Reduced ability to forward bend   [x]Reduced ability to ambulate prolonged functional periods/distances/surfaces   [x]Reduced ability to ascend/descend stairs   [x]Reduced ability to run, hop or jump   []other:     Participation Restrictions   []Reduced participation in self care activities   [x]Reduced participation in home management activities   [x]Reduced participation in work activities   []Reduced participation in social activities. []Reduced participation in sport activities.     Classification :    []Signs/symptoms consistent with post-surgical Weeks:  Interventions:  [x]  Therapeutic exercise including: strength training, ROM, for Lower extremity and core   [x]  NMR activation and proprioception for LE, Glutes and Core   [x]  Manual therapy as indicated for LE, Hip and spine to include: Dry Needling/IASTM, STM, PROM, Gr I-IV mobilizations, manipulation. [x] Modalities as needed that may include: thermal agents, E-stim, Biofeedback, US, iontophoresis as indicated  [x] Patient education on joint protection, postural re-education, activity modification, progression of HEP. HEP instruction: Provided and reviewed with patient (see scanned forms)    GOALS:   Patient stated goal: Reduce Stiffness; Be able to walk without stiffness    [] Progressing: [] Met: [] Not Met: [] Adjusted    Therapist goals for Patient:   Short Term Goals: To be achieved in: 2 weeks  1. Independent in HEP and progression per patient tolerance, in order to prevent re-injury. [] Progressing: [] Met: [] Not Met: [] Adjusted   2. Patient will have a decrease in pain to facilitate improvement in movement, function, and ADLs as indicated by Functional Deficits. [] Progressing: [] Met: [] Not Met: [] Adjusted    Long Term Goals: To be achieved in: 8 weeks  1. Disability index score of 40% or less for the [facility] to assist with reaching prior level of function. [] Progressing: [] Met: [] Not Met: [] Adjusted  2. Patient will demonstrate increased [facility]OM to [facility] to allow for proper joint functioning as indicated by patients Functional Deficits. [] Progressing: [] Met: [] Not Met: [] Adjusted  3. Patient will demonstrate an increase in Strength to good proximal hip strength and control, within 5lb HHD in LE to allow for proper functional mobility as indicated by patients Functional Deficits. [] Progressing: [] Met: [] Not Met: [] Adjusted  4. Patient will return to Independent functional activities without increased symptoms or restriction.    [] Progressing: [] Met: [] Not Met: []

## 2020-03-02 NOTE — FLOWSHEET NOTE
equal and normal bilaterally   []Other:    Joint mobility: 3/2   [x]Normal    []Hypo   []Hyper    Palpation: TTP along medial and lateral joint line; TTP popliteal fossa region  3/2    Functional Mobility/Transfers: Independent with increased pain; Pain with transitioning to standing after sitting for greater than 40 minutes; unable to ascend/descend stairs in a normalized reciprocal gait pattern; Pain with ambulating over an hour   3/2    Posture: Forward head and rounded shoulders  3/2    Gait: (include devices/WB status) Slight antalgic present   3/2                       [x] Patient history, allergies, meds reviewed. Medical chart reviewed. See intake form. 3/2    Review Of Systems (ROS):  [x]Performed Review of systems (Integumentary, CardioPulmonary, Neurological) by intake and observation. Intake form has been scanned into medical record. Patient has been instructed to contact their primary care physician regarding ROS issues if not already being addressed at this time.   3/2    RESTRICTIONS/PRECAUTIONS: R Knee OA    Exercises/Interventions:   Exercise/Equipment Resistance/Repetitions Other comments   Stretching     Hamstring (EOB) 30 sec x 5 Start 3/2   Towel Pull 30 sec x 5 Start 3/2   Inclined Calf     Hip Flexion     ITB     Groin     Quad                    SLR     Supine     Abduction     Adduction     Prone     SLR+          Isometrics     Quad sets 10 sec x 10 Start 3/2   Adduction with ball 10 sec x 10 Start 3/2   Patellar Glides     Medial     Superior     Inferior          ROM     Sheet Pulls 10 sec x 10 Start 3/2   Hang Weights     Passive     Active     Weight Shift     Ankle Pumps                    CKC     Calf raises     Wall sits     Step ups     1 leg stand     Squatting     CC TKE     Balance     bridges          PRE     Extension  RANGE:   Flexion  RANGE:        Quantum machines     Leg press      Leg extension     Leg curl          Manual interventions                     Therapeutic Exercise and NMR EXR  [x] (60156) Provided verbal/tactile cueing for activities related to strengthening, flexibility, endurance, ROM for improvements in LE, proximal hip, and core control with self care, mobility, lifting, ambulation. [x] (58467) Provided verbal/tactile cueing for activities related to improving balance, coordination, kinesthetic sense, posture, motor skill, proprioception  to assist with LE, proximal hip, and core control in self care, mobility, lifting, ambulation and eccentric single leg control.      NMR and Therapeutic Activities:    [x] (63931 or 55337) Provided verbal/tactile cueing for activities related to improving balance, coordination, kinesthetic sense, posture, motor skill, proprioception and motor activation to allow for proper function of core, proximal hip and LE with self care and ADLs  [] (76421) Gait Re-education- Provided training and instruction to the patient for proper LE, core and proximal hip recruitment and positioning and eccentric body weight control with ambulation re-education including up and down stairs     Home Exercise Program:    [x] (68729) Reviewed/Progressed HEP activities related to strengthening, flexibility, endurance, ROM of core, proximal hip and LE for functional self-care, mobility, lifting and ambulation/stair navigation   [] (16945)Reviewed/Progressed HEP activities related to improving balance, coordination, kinesthetic sense, posture, motor skill, proprioception of core, proximal hip and LE for self care, mobility, lifting, and ambulation/stair navigation      Manual Treatments:  PROM / STM / Oscillations-Mobs:  G-I, II, III, IV (PA's, Inf., Post.)  [] (80974) Provided manual therapy to mobilize LE, proximal hip and/or LS spine soft tissue/joints for the purpose of modulating pain, promoting relaxation,  increasing ROM, reducing/eliminating soft tissue swelling/inflammation/restriction, improving soft tissue extensibility and allowing for proper ROM for normal function with self care, mobility, lifting and ambulation. Modalities:  CP 15 min  3/2     Charges:  Timed Code Treatment Minutes: 30 + EVAL   Total Treatment Minutes: 70       [] EVAL (LOW) 64728 (typically 20 minutes face-to-face)  [x] EVAL (MOD) 69227 (typically 30 minutes face-to-face)  [] EVAL (HIGH) 84613 (typically 45 minutes face-to-face)  [] RE-EVAL   [x] NL(40051) x 1  [] IONTO  [x] NMR (30356) x 1    [] VASO  [] Manual (84086) x      [] Other:  [] TA x      [] Mech Traction (37080)  [] ES(attended) (73462)      [] ES (un) (12556):     GOALS:   Patient stated goal: Reduce Stiffness; Be able to walk without stiffness    [] Progressing: [] Met: [] Not Met: [] Adjusted    Therapist goals for Patient:   Short Term Goals: To be achieved in: 2 weeks  1. Independent in HEP and progression per patient tolerance, in order to prevent re-injury. [] Progressing: [] Met: [] Not Met: [] Adjusted   2. Patient will have a decrease in pain to facilitate improvement in movement, function, and ADLs as indicated by Functional Deficits. [] Progressing: [] Met: [] Not Met: [] Adjusted    Long Term Goals: To be achieved in: 8 weeks  1. Disability index score of 40% or less for the [facility] to assist with reaching prior level of function. [] Progressing: [] Met: [] Not Met: [] Adjusted  2. Patient will demonstrate increased [facility] to allow for proper joint functioning as indicated by patients Functional Deficits. [] Progressing: [] Met: [] Not Met: [] Adjusted  3. Patient will demonstrate an increase in Strength to good proximal hip strength and control, within 5lb HHD in LE to allow for proper functional mobility as indicated by patients Functional Deficits. [] Progressing: [] Met: [] Not Met: [] Adjusted  4. Patient will return to Independent functional activities without increased symptoms or restriction.    [] Progressing: [] Met: [] Not Met: [] Adjusted     Overall Progression Towards Functional

## 2020-04-21 ENCOUNTER — TELEPHONE (OUTPATIENT)
Dept: ADMINISTRATIVE | Age: 60
End: 2020-04-21

## 2020-04-21 NOTE — TELEPHONE ENCOUNTER
The pt called because she received a call to reschedule her ov appointment to a vv with Dr. Vsihnu Yip. When scheduling, the decision tree suggested the pt be sent to nurse triage for red flag symptoms. The pt refused and just wanted to be scheduled. Her appointment is tomorrow at 220pm.  She is currently having tingling,burning, stabbing pain in her legs.

## 2020-04-22 ENCOUNTER — VIRTUAL VISIT (OUTPATIENT)
Dept: PRIMARY CARE CLINIC | Age: 60
End: 2020-04-22
Payer: COMMERCIAL

## 2020-04-22 VITALS — WEIGHT: 246 LBS | SYSTOLIC BLOOD PRESSURE: 126 MMHG | DIASTOLIC BLOOD PRESSURE: 84 MMHG | BODY MASS INDEX: 40.94 KG/M2

## 2020-04-22 PROCEDURE — 99441 PR PHYS/QHP TELEPHONE EVALUATION 5-10 MIN: CPT | Performed by: INTERNAL MEDICINE

## 2020-04-22 RX ORDER — TRAMADOL HYDROCHLORIDE 50 MG/1
50 TABLET ORAL EVERY 6 HOURS PRN
Qty: 28 TABLET | Refills: 0 | Status: SHIPPED | OUTPATIENT
Start: 2020-04-22 | End: 2020-04-29

## 2020-04-22 RX ORDER — METHYLPREDNISOLONE 4 MG/1
TABLET ORAL
Qty: 21 TABLET | Refills: 0 | Status: SHIPPED | OUTPATIENT
Start: 2020-04-22 | End: 2020-04-28

## 2020-04-22 RX ORDER — CELECOXIB 200 MG/1
200 CAPSULE ORAL DAILY
Qty: 30 CAPSULE | Refills: 0 | Status: SHIPPED | OUTPATIENT
Start: 2020-04-22 | End: 2020-10-26

## 2020-04-22 ASSESSMENT — ENCOUNTER SYMPTOMS
EYES NEGATIVE: 1
CONSTIPATION: 0
ABDOMINAL DISTENTION: 0
CHEST TIGHTNESS: 0
BLOOD IN STOOL: 0
SHORTNESS OF BREATH: 0
GASTROINTESTINAL NEGATIVE: 1
DIARRHEA: 0
WHEEZING: 0
COUGH: 0
ABDOMINAL PAIN: 0

## 2020-04-27 ENCOUNTER — TELEPHONE (OUTPATIENT)
Dept: PULMONOLOGY | Age: 60
End: 2020-04-27

## 2020-04-27 ENCOUNTER — VIRTUAL VISIT (OUTPATIENT)
Dept: PULMONOLOGY | Age: 60
End: 2020-04-27
Payer: COMMERCIAL

## 2020-04-27 PROCEDURE — 99214 OFFICE O/P EST MOD 30 MIN: CPT | Performed by: INTERNAL MEDICINE

## 2020-04-27 RX ORDER — AZELASTINE HYDROCHLORIDE AND FLUTICASONE PROPIONATE 137; 50 UG/1; UG/1
1 SPRAY, METERED NASAL 2 TIMES DAILY
Qty: 1 BOTTLE | Refills: 2 | Status: SHIPPED | OUTPATIENT
Start: 2020-04-27 | End: 2020-07-01 | Stop reason: SDUPTHER

## 2020-04-30 ENCOUNTER — VIRTUAL VISIT (OUTPATIENT)
Dept: PRIMARY CARE CLINIC | Age: 60
End: 2020-04-30
Payer: COMMERCIAL

## 2020-04-30 ENCOUNTER — OFFICE VISIT (OUTPATIENT)
Dept: ORTHOPEDIC SURGERY | Age: 60
End: 2020-04-30
Payer: COMMERCIAL

## 2020-04-30 VITALS
HEART RATE: 98 BPM | HEIGHT: 65 IN | SYSTOLIC BLOOD PRESSURE: 124 MMHG | WEIGHT: 246.03 LBS | BODY MASS INDEX: 40.99 KG/M2 | TEMPERATURE: 97.7 F | DIASTOLIC BLOOD PRESSURE: 76 MMHG

## 2020-04-30 VITALS — DIASTOLIC BLOOD PRESSURE: 84 MMHG | WEIGHT: 224 LBS | BODY MASS INDEX: 37.28 KG/M2 | SYSTOLIC BLOOD PRESSURE: 126 MMHG

## 2020-04-30 PROCEDURE — 99214 OFFICE O/P EST MOD 30 MIN: CPT | Performed by: PHYSICIAN ASSISTANT

## 2020-04-30 PROCEDURE — 20610 DRAIN/INJ JOINT/BURSA W/O US: CPT | Performed by: PHYSICIAN ASSISTANT

## 2020-04-30 PROCEDURE — 99213 OFFICE O/P EST LOW 20 MIN: CPT | Performed by: INTERNAL MEDICINE

## 2020-04-30 RX ORDER — TIZANIDINE 2 MG/1
2 TABLET ORAL EVERY 8 HOURS PRN
Qty: 30 TABLET | Refills: 0 | Status: SHIPPED | OUTPATIENT
Start: 2020-04-30 | End: 2020-12-31 | Stop reason: SDUPTHER

## 2020-04-30 RX ORDER — METHYLPREDNISOLONE ACETATE 40 MG/ML
40 INJECTION, SUSPENSION INTRA-ARTICULAR; INTRALESIONAL; INTRAMUSCULAR; SOFT TISSUE ONCE
Status: COMPLETED | OUTPATIENT
Start: 2020-04-30 | End: 2020-04-30

## 2020-04-30 RX ORDER — LIDOCAINE 50 MG/G
1 PATCH TOPICAL EVERY 12 HOURS
Qty: 5 PATCH | Refills: 0 | Status: SHIPPED | OUTPATIENT
Start: 2020-04-30 | End: 2020-12-17

## 2020-04-30 RX ADMIN — METHYLPREDNISOLONE ACETATE 40 MG: 40 INJECTION, SUSPENSION INTRA-ARTICULAR; INTRALESIONAL; INTRAMUSCULAR; SOFT TISSUE at 12:12

## 2020-04-30 ASSESSMENT — ENCOUNTER SYMPTOMS
COUGH: 0
ABDOMINAL PAIN: 0
GASTROINTESTINAL NEGATIVE: 1
CONSTIPATION: 0
EYES NEGATIVE: 1
CHEST TIGHTNESS: 0
SHORTNESS OF BREATH: 0
ABDOMINAL DISTENTION: 0
DIARRHEA: 0
WHEEZING: 0
BACK PAIN: 1
BLOOD IN STOOL: 0

## 2020-04-30 NOTE — PROGRESS NOTES
pulse      Additional evaluation: Hip/low back    Inspection: No effusion, ecchymosis, discoloration, erythema, excessive warmth. no gross deformities, no signs of infection. Palpation: No tenderness to palpation of the transverse or lumbar spinous processes or transverse processes. No paraspinal muscle spasm. No step-off deformity palpable. No tenderness over the right SI joint or PSIS. Mild tenderness to palpation of the left SI joint. Tenderness to palpation of the gluteus medius with no tenderness over the greater trochanter or iliac crest.    Range of motion: Grossly intact in all movements with pain noted of left hip flexion or right lateral bending. Neurovascular: 2+ dorsal pedis and posterior tibialis pulse. Dermatomes and myotomes intact, equally bilaterally. DTRs intact and equal        Office Procedures:    After informed consent was provided, the patient was seated on the exam table with the right knee flexed to 90 degrees. The anterolateral aspect of the knee adjacent to the joint line was prepped with Chlora-prep. The skin and subcutaneous tissues were anesthetized with ethyl chloride spray. A 22-gauge needle was inserted into the right knee and 2 ml of 40 mg/ml DepoMedrol was injected. The needle was withdrawn and the puncture site sealed with a Band-Aid. The patient tolerated the procedure well. Post injection instructions and precautions given and any problems to notify us. Assessment: Patient is a 64 y.o. female presenting to the office for follow-up evaluation of her right knee pain as well as left hip pain. Patient is a working diagnosis of right knee osteoarthritis, neuralgia, SI joint sprain, gluteus medius strain, and obesity. Visit Diagnoses       Codes    Primary osteoarthritis of right knee    -  Primary M17.11    Right knee pain, unspecified chronicity     M25.561    Neuralgia     M79.2    Sprain of sacroiliac joint, initial encounter     S33. 6XXA    Strain of gluteus medius of left lower extremity, initial encounter     S76.012A          Orders Placed This Encounter   Medications    methylPREDNISolone acetate (DEPO-MEDROL) injection 40 mg    Gabapentin 10 % CREA     Sig: Apply 1 cm2 topically 2 times daily as needed (if burning or electric sensation persists) Apply thin layer of cream over affected area. Wash hands after application. Stop use if side-effects occur and call your doctor. Dispense:  2 Tube     Refill:  0    lidocaine (LIDODERM) 5 %     Sig: Place 1 patch onto the skin every 12 hours 12 hours on, 12 hours off. Dispense:  5 patch     Refill:  0     Please direct patient to OTC Lidoderm patches if her insurance doesn't cover the medication. Medical decision making:  Patient's workup and evaluation were reviewed with the patient in the office today. Patient was educated on conservative therapy for her condition as detailed in the treatment plan below. Patient was once again counseled on weight management. Patient was given a corticosteroid injection in the right knee to assist in decreasing inflammation and assisting with pain. Patient was educated on postinjection precautions. Patient was also prescribed topical gabapentin and instructed on its appropriate use for the neuralgia over the right anterior lateral thigh. Furthermore, the patient was prescribed Lidoderm patches for the SI joint sprain and gluteus medius strain. Patient is currently on Celebrex and she was educated not to utilize any other NSAIDs for her condition. She was given precautions on side effects of Celebrex for which she should call her PCP or go immediately to the emergency department. Patient was instructed to follow-up in 3 months for repeat evaluation or sooner should her condition change or worsen. All the patient's questions were answered while in the clinic. The patient is understanding of all instructions and agrees with the plan.     Patient does not

## 2020-04-30 NOTE — PROGRESS NOTES
Subjective:      Patient ID: Amy Reyes is a 64 y.o. female. 4/30/2020 Patient presents with:  Lower Back Pain: just started a few hrs ago now feels spasms and extreme pain   Knee Pain: better with Celebrex . Also got a shot from Ortho a few hrs ago               Last seen  By VV4/22/2020 Patient presents with:  Pain: rt knee and thigh > 6 mths . Getting unbearable now     Has seen Ortho . nsaids not helping . Tried PT but no relief                 Last seen by me 12/30/2019   Dr Columba Whitehead Patient presents with: Annual Exam. Needs for Work              Review of Systems   Constitutional: Negative for activity change, appetite change, fatigue, fever and unexpected weight change. Tdap 2015     Flu Vac 12/19     Pneum Vac     Shingrex/ script  12/30/19   HENT: Negative. Dental ex reg    Eyes: Negative. Negative for visual disturbance. Last Eye exam  12/19   Respiratory: Negative for cough, chest tightness, shortness of breath and wheezing. Does not smoke . Rare Etoh     Asthma  >>   Cardiovascular: Negative. HTN  2009     No known CAD . No FH either     Fairly active    Gastrointestinal: Negative. Negative for abdominal distention, abdominal pain, blood in stool, constipation and diarrhea. No FH of Ca colon     Colonoscopy 2018    Endocrine:        No FH of diabetes    Genitourinary: Negative for dysuria, frequency, menstrual problem, urgency and vaginal discharge. Natural menopause 2017     Least pelvic / pap 2017     One 12 yr old  . Gestational Diabetes     Mammogram 1/19    Musculoskeletal: Positive for arthralgias and back pain. H/O Torn Meniscus rt    Allergic/Immunologic: Positive for environmental allergies. Negative for food allergies. Neurological: Negative for dizziness, weakness and headaches. Psychiatric/Behavioral: Negative for behavioral problems, dysphoric mood and sleep disturbance. The patient is not nervous/anxious.

## 2020-05-01 ENCOUNTER — TELEPHONE (OUTPATIENT)
Dept: ORTHOPEDIC SURGERY | Age: 60
End: 2020-05-01

## 2020-05-01 NOTE — TELEPHONE ENCOUNTER
QUIRINO FROM [facility]  PHARMACY [phone] CALLING ABOUT COMPOUND MEDICATION CREAM.  PRESCRIPTION NOT LEGIBLE SHE NEEDS TO KNOW THE QUANTITY

## 2020-06-01 ENCOUNTER — TELEPHONE (OUTPATIENT)
Dept: PULMONOLOGY | Age: 60
End: 2020-06-01

## 2020-06-01 RX ORDER — PREDNISONE 10 MG/1
10 TABLET ORAL 2 TIMES DAILY
Qty: 10 TABLET | Refills: 0 | Status: SHIPPED | OUTPATIENT
Start: 2020-06-01 | End: 2020-06-06

## 2020-06-01 RX ORDER — BROMPHENIRAMINE MALEATE, PSEUDOEPHEDRINE HYDROCHLORIDE, AND DEXTROMETHORPHAN HYDROBROMIDE 2; 30; 10 MG/5ML; MG/5ML; MG/5ML
5 SYRUP ORAL 4 TIMES DAILY PRN
Qty: 473 ML | Refills: 0 | Status: SHIPPED | OUTPATIENT
Start: 2020-06-01 | End: 2020-07-01 | Stop reason: SDUPTHER

## 2020-06-01 NOTE — TELEPHONE ENCOUNTER
Complains of cough   Duration : worse for 2 weeks now   Cough with sputum production? No     Fever? No   Any other Symptoms? Head hurts and sides of body ; cough is choking   Any current treatment tried? No   Using inhalers? yes do they help? Some   Pharmacy?   [address]

## 2020-07-01 ENCOUNTER — VIRTUAL VISIT (OUTPATIENT)
Dept: PULMONOLOGY | Age: 60
End: 2020-07-01
Payer: COMMERCIAL

## 2020-07-01 ENCOUNTER — TELEPHONE (OUTPATIENT)
Dept: PULMONOLOGY | Age: 60
End: 2020-07-01

## 2020-07-01 PROCEDURE — 99213 OFFICE O/P EST LOW 20 MIN: CPT | Performed by: INTERNAL MEDICINE

## 2020-07-01 RX ORDER — AZELASTINE HYDROCHLORIDE, FLUTICASONE PROPIONATE 137; 50 UG/1; UG/1
1 SPRAY, METERED NASAL 2 TIMES DAILY
Qty: 1 BOTTLE | Refills: 2 | Status: SHIPPED | OUTPATIENT
Start: 2020-07-01 | End: 2021-02-09

## 2020-07-01 RX ORDER — BROMPHENIRAMINE MALEATE, PSEUDOEPHEDRINE HYDROCHLORIDE, AND DEXTROMETHORPHAN HYDROBROMIDE 2; 30; 10 MG/5ML; MG/5ML; MG/5ML
5 SYRUP ORAL 4 TIMES DAILY PRN
Qty: 473 ML | Refills: 0 | Status: SHIPPED | OUTPATIENT
Start: 2020-07-01 | End: 2020-11-10

## 2020-07-01 NOTE — PROGRESS NOTES
Chief complaint  This is a 64y.o. year old female  who presents with a chief complaint of   Chief Complaint   Patient presents with    Asthma    Cough     This was a telehealth visit performed during the coronavirus pandemic using video and audio. Patient provided consent for the visit. Participants were Isra Ring MD in the office and patient at home. HPI  61-year-old woman with cough, asthma and post-nasal drainage presents for follow up. About a month ago she was prescribed 5 days of prednisone and Bromfed-DM due to increased cough. She says her cough has now resolved. She completed prednisone. She is using Bromfed-DM as needed. She denies shortness of breath. She denies nighttime awakenings due to cough or shortness of breath. She is taking Advair twice daily. She has not had to use her albuterol inhaler. She takes Singulair at night. She uses Dymista as needed. She was using Flonase, but felt it did not help. Past Medical History:   Diagnosis Date    GERD (gastroesophageal reflux disease)     Gestational diabetes     Hypertension     Morbid obesity due to excess calories ([facility] [address] 2016    Severe persistent asthma 2019    Vitamin D deficiency     Xerosis cutis        Past Surgical History:   Procedure Laterality Date     SECTION         Current Outpatient Medications   Medication Sig Dispense Refill    brompheniramine-pseudoephedrine-DM 2-30-10 MG/5ML syrup Take 5 mLs by mouth 4 times daily as needed for Cough 473 mL 0    Gabapentin 10 % CREA Apply 1 cm2 topically 2 times daily as needed (if burning or electric sensation persists) Apply thin layer of cream over affected area. Wash hands after application. Stop use if side-effects occur and call your doctor.  2 Tube 0    lidocaine (LIDODERM) 5 % Place 1 patch onto the skin every 12 hours 12 hours on, 12 hours off. 5 patch 0    tiZANidine (ZANAFLEX) 2 MG tablet Take 1 tablet by mouth every 8 hours as needed (spasms) 30 tablet 0    Azelastine-Fluticasone (DYMISTA) 137-50 MCG/ACT SUSP 1 spray by Nasal route 2 times daily 1 Bottle 2    celecoxib (CELEBREX) 200 MG capsule Take 1 capsule by mouth daily 30 capsule 0    ipratropium (ATROVENT) 0.03 % nasal spray 2 sprays by Nasal route 2 times daily 1 Bottle 3    candesartan (ATACAND) 32 MG tablet Take 1 tablet by mouth daily 90 tablet 5    hydrochlorothiazide (HYDRODIURIL) 25 MG tablet Take 1 tablet by mouth every morning 90 tablet 5    montelukast (SINGULAIR) 10 MG tablet Take 1 tablet by mouth nightly 90 tablet 3    albuterol sulfate  (90 Base) MCG/ACT inhaler Inhale 2 puffs into the lungs every 4 hours as needed for Wheezing 1 Inhaler 6    fluticasone-salmeterol (ADVAIR DISKUS) 500-50 MCG/DOSE diskus inhaler Inhale 1 puff into the lungs every 12 hours 60 each 11     No current facility-administered medications for this visit.         Allergies   Allergen Reactions    Chloroquine     Lisinopril      Coughing and choking    Mefloquine     Quinolones        FAMILY HISTORY  Maternal aunts- HTN    Social History     Socioeconomic History    Marital status:      Spouse name: Not on file    Number of children: Not on file    Years of education: Not on file    Highest education level: Not on file   Occupational History    Not on file   Social Needs    Financial resource strain: Not on file    Food insecurity     Worry: Not on file     Inability: Not on file    Transportation needs     Medical: Not on file     Non-medical: Not on file   Tobacco Use    Smoking status: Never Smoker    Smokeless tobacco: Never Used   Substance and Sexual Activity    Alcohol use: No     Alcohol/week: 0.0 standard drinks    Drug use: No    Sexual activity: Yes     Partners: Male   Lifestyle    Physical activity     Days per week: Not on file     Minutes per session: Not on file    Stress: Not on file   Relationships    Social connections     Talks on phone: Not on file     Gets together: Not on file     Attends Jainism service: Not on file     Active member of club or organization: Not on file     Attends meetings of clubs or organizations: Not on file     Relationship status: Not on file    Intimate partner violence     Fear of current or ex partner: Not on file     Emotionally abused: Not on file     Physically abused: Not on file     Forced sexual activity: Not on file   Other Topics Concern    Not on file   Social History Narrative    Not on file   Never smoked, but her mother smoked    Immunization History   Administered Date(s) Administered    Influenza Vaccine, unspecified formulation 12/02/2016    Influenza, Intradermal, Quadrivalent, Preservative Free 12/18/2017    Influenza, Quadv, IM, (6 mo and older Fluzone, Flulaval, Fluarix and 3 yrs and older Afluria) 10/25/2018    Influenza, Quadv, IM, PF (6 mo and older Fluzone, Flulaval, Fluarix, and 3 yrs and older Afluria) 12/30/2019    Pneumococcal Polysaccharide ([id number]) 12/30/2019    Tdap (Boostrix, Adacel) 03/28/2013, 11/27/2015       ROS:  GENERAL:  No fevers, no chills  RESPIRATORY:  No shortness of breath, no cough    PHYSICAL EXAM:  None. Video visit     Data reviewed:  Pulmonary Function Testing (9/16/19)  FVC 1.98 L at 76% predicted ---> 1.9L at 74% predicted  FEV1 1.74 L at 85% predicted ----> 1.72L at 84% predicted  FEV1/FVC ratio at 88% ---->91% predicted  TLC 4.27 L at 88% predicted  DLCO 25.12 at 104% predicted    Methacholine challenge: Positive at 16mg/ml    Overall: Normal flow measurements without significant reversal; normal diffusing capacity; methacholine challenge shows borderline bronchial hyperreactivity    Images and reports of chest imaging were reviewed by me.  My interpretation is:  CXR (10/18/19): Clear lung fields      Lab Results   Component Value Date    WBC 4.9 12/30/2019    HGB 13.6 12/30/2019    HCT 40.8 12/30/2019    MCV 85.8 12/30/2019     12/30/2019       No results found for: BNP    Lab Results   Component Value Date    CREATININE 0.8 12/30/2019    BUN 22 (H) 12/30/2019     12/30/2019    K 4.4 12/30/2019     12/30/2019    CO2 25 12/30/2019   Allergy panel: Negative  IgE- 32      Assessment/Plan:64y.o. year old female presents for follow up. Cough- Likely due to combination of asthma and upper airway cough syndrome. Continue Singulair nightly. Bromfed-DM as needed for symptomatic management. Asthma- She will continue Advair 500/50 mcg twice daily with albuterol inhaler as needed. Post-nasal drainage- Continue Dymista as needed. She will return for follow-up in 3 months.       Criss Erickson MD  [facility]

## 2020-08-03 ENCOUNTER — TELEPHONE (OUTPATIENT)
Dept: PULMONOLOGY | Age: 60
End: 2020-08-03

## 2020-08-03 RX ORDER — BROMPHENIRAMINE MALEATE, PSEUDOEPHEDRINE HYDROCHLORIDE, AND DEXTROMETHORPHAN HYDROBROMIDE 2; 30; 10 MG/5ML; MG/5ML; MG/5ML
5 SYRUP ORAL 4 TIMES DAILY PRN
Qty: 473 ML | Refills: 0 | Status: SHIPPED | OUTPATIENT
Start: 2020-08-03 | End: 2020-11-10

## 2020-08-03 NOTE — TELEPHONE ENCOUNTER
Patient has chronic cough is requesting medication she has not mucous or fever. She cancelled her 8/19/20 appt back in April due to the virus and there are no openings until mid September.   Please advise

## 2020-08-06 ENCOUNTER — APPOINTMENT (OUTPATIENT)
Dept: GENERAL RADIOLOGY | Age: 60
End: 2020-08-06
Payer: COMMERCIAL

## 2020-08-06 ENCOUNTER — HOSPITAL ENCOUNTER (EMERGENCY)
Age: 60
Discharge: HOME | End: 2020-08-06
Attending: EMERGENCY MEDICINE
Payer: COMMERCIAL

## 2020-08-06 VITALS
HEART RATE: 89 BPM | TEMPERATURE: 98.4 F | OXYGEN SATURATION: 100 % | BODY MASS INDEX: 37.32 KG/M2 | WEIGHT: 224 LBS | SYSTOLIC BLOOD PRESSURE: 101 MMHG | RESPIRATION RATE: 16 BRPM | DIASTOLIC BLOOD PRESSURE: 84 MMHG | HEIGHT: 65 IN

## 2020-08-06 PROCEDURE — 73030 X-RAY EXAM OF SHOULDER: CPT

## 2020-08-06 PROCEDURE — 99283 EMERGENCY DEPT VISIT LOW MDM: CPT

## 2020-08-06 ASSESSMENT — ENCOUNTER SYMPTOMS
EYES NEGATIVE: 1
GASTROINTESTINAL NEGATIVE: 1
RESPIRATORY NEGATIVE: 1

## 2020-08-06 ASSESSMENT — PAIN SCALES - GENERAL: PAINLEVEL_OUTOF10: 8

## 2020-08-06 ASSESSMENT — PAIN DESCRIPTION - PAIN TYPE: TYPE: ACUTE PAIN

## 2020-08-06 ASSESSMENT — PAIN DESCRIPTION - ORIENTATION: ORIENTATION: LEFT

## 2020-08-06 ASSESSMENT — PAIN DESCRIPTION - LOCATION: LOCATION: SHOULDER

## 2020-08-06 NOTE — ED PROVIDER NOTES
[facility]  EMERGENCY DEPARTMENT ENCOUNTER          [address] RESIDENT NOTE       Date of evaluation: 2020    Chief Complaint     Shoulder Pain      History of Present Illness     Юлия Barahona is a 64 y.o. female who presents chief complaint of left shoulder pain. Patient reports bumping into her ironing board at home on Tuesday and has had constant pain ever since. She reports the pain as an 8 out of 10 radiating down her arms and down to each finger. The pain is relieved by lying down and is exacerbated by sitting up and movement. She does report being able to complete her daily activities throughout the day. She is also been sleeping on the couch on and off for the past 2 days and she believes that this is making her pain worse. She has good motor function throughout her left upper extremity as well as good sensory function. She also reports some mild associated clavicular pain. She does have a history of osteoarthritis in the right knee which she receives treatment for. She has never had an incident happened like this to her in the past.    Review of Systems     Review of Systems   Constitutional: Negative. HENT: Negative. Eyes: Negative. Respiratory: Negative. Cardiovascular: Negative. Gastrointestinal: Negative. Endocrine: Negative. Genitourinary: Negative. Musculoskeletal:        Left shoulder pain   Skin: Negative. Neurological: Negative. Hematological: Negative. Psychiatric/Behavioral: Negative. Past Medical, Surgical, Family, and Social History     She has a past medical history of GERD (gastroesophageal reflux disease), Gestational diabetes, Hypertension, Morbid obesity due to excess calories ([address] Severe persistent asthma, Vitamin D deficiency, and Xerosis cutis. She has a past surgical history that includes  section (). Her family history is not on file. She reports that she has never smoked.  She has never used smokeless Abdominal:      General: Abdomen is flat. Bowel sounds are normal.      Palpations: Abdomen is soft. Musculoskeletal: Normal range of motion. Skin:     General: Skin is warm and dry. Capillary Refill: Capillary refill takes less than 2 seconds. Neurological:      General: No focal deficit present. Mental Status: She is alert. Psychiatric:         Mood and Affect: Mood normal.         Diagnostic Results       RADIOLOGY:  XR SHOULDER LEFT (MIN 2 VIEWS)   Final Result   Impression: No acute osseous abnormality. Mild degenerative change, as outlined above. LABS:   No results found for this visit on 08/06/20. ED BEDSIDE ULTRASOUND:      RECENT VITALS:  BP: (!) 148/95, Temp: 98.4 °F (36.9 °C),Pulse: 89, Resp: 16, SpO2: 98 %     Procedures         ED Course     Nursing Notes, Past Medical Hx, Past Surgical Hx, Social Hx, Allergies, and FamilyHx were reviewed. The patient was giventhe following medications:  No orders of the defined types were placed in this encounter. CONSULTS:  None    MEDICAL DECISION MAKING / ASSESSMENT / Ed Mitch is a 64 y.o. female with left shoulder pain. Based on her physical exam do not suspect any acute abnormality at this time. However will evaluate with a left shoulder x-ray. Shoulder x-rays at this time showed no acute abnormalities. Will be told to rest, ice, alternate Tylenol and Advil at this time. She is given information on shoulder pain. Told to follow-up with her PCP if condition worsen. This patient was also evaluated by the attending physician. All care plans were discussed and agreed upon. Clinical Impression     1.  Acute pain of left shoulder        Disposition     PATIENT REFERRED TO:  Isaak Benítez MD  [id number] [address]  [phone]    In 1 week  If symptoms worsen      DISCHARGE MEDICATIONS:  New Prescriptions    No medications on file       DISPOSITION Decision To Discharge 08/06/2020 04:56:53 Bertram Douglass MD  Resident  08/06/20 [id number]

## 2020-08-06 NOTE — ED PROVIDER NOTES
ED Attending Attestation Note     Date of evaluation: 8/6/2020    This patient was seen by the resident. I have seen and examined the patient, agree with the workup, evaluation, management and diagnosis. The care plan has been discussed. I was present for any procedures performed in the resident's  note and have made edits to the note where appropriate. My assessment reveals 64 y.o. female presenting for minor left shoulder trauma multiple days ago. Has good active and passive range of motion of the shoulder. Neurovascularly intact distally. Will obtain x-ray.        Nicholas Hernandez MD  08/06/20 [id number]

## 2020-08-13 ENCOUNTER — OFFICE VISIT (OUTPATIENT)
Dept: ORTHOPEDIC SURGERY | Age: 60
End: 2020-08-13
Payer: COMMERCIAL

## 2020-08-13 VITALS — TEMPERATURE: 98.1 F | BODY MASS INDEX: 37.32 KG/M2 | WEIGHT: 223.99 LBS | HEIGHT: 65 IN

## 2020-08-13 PROCEDURE — 99213 OFFICE O/P EST LOW 20 MIN: CPT | Performed by: PHYSICIAN ASSISTANT

## 2020-08-13 PROCEDURE — 20610 DRAIN/INJ JOINT/BURSA W/O US: CPT | Performed by: PHYSICIAN ASSISTANT

## 2020-08-13 RX ORDER — METHYLPREDNISOLONE ACETATE 40 MG/ML
40 INJECTION, SUSPENSION INTRA-ARTICULAR; INTRALESIONAL; INTRAMUSCULAR; SOFT TISSUE ONCE
Status: COMPLETED | OUTPATIENT
Start: 2020-08-13 | End: 2020-08-13

## 2020-08-13 RX ADMIN — METHYLPREDNISOLONE ACETATE 40 MG: 40 INJECTION, SUSPENSION INTRA-ARTICULAR; INTRALESIONAL; INTRAMUSCULAR; SOFT TISSUE at 16:13

## 2020-08-13 NOTE — PROGRESS NOTES
2002       No family history on file. Social History     Socioeconomic History    Marital status:      Spouse name: None    Number of children: None    Years of education: None    Highest education level: None   Occupational History    None   Social Needs    Financial resource strain: None    Food insecurity     Worry: None     Inability: None    Transportation needs     Medical: None     Non-medical: None   Tobacco Use    Smoking status: Never Smoker    Smokeless tobacco: Never Used   Substance and Sexual Activity    Alcohol use: No     Alcohol/week: 0.0 standard drinks    Drug use: No    Sexual activity: Yes     Partners: Male   Lifestyle    Physical activity     Days per week: None     Minutes per session: None    Stress: None   Relationships    Social connections     Talks on phone: None     Gets together: None     Attends Congregation service: None     Active member of club or organization: None     Attends meetings of clubs or organizations: None     Relationship status: None    Intimate partner violence     Fear of current or ex partner: None     Emotionally abused: None     Physically abused: None     Forced sexual activity: None   Other Topics Concern    None   Social History Narrative    None       Current Outpatient Medications   Medication Sig Dispense Refill    Gabapentin 10 % CREA Apply 1 cm2 topically 2 times daily as needed (if burning or electric sensation persists) Apply thin layer of cream over affected area. Wash hands after application. Stop use if side-effects occur and call your doctor.  2 Tube 1    brompheniramine-pseudoephedrine-DM (BROMFED DM) 2-30-10 MG/5ML syrup Take 5 mLs by mouth 4 times daily as needed for Cough 473 mL 0    brompheniramine-pseudoephedrine-DM 2-30-10 MG/5ML syrup Take 5 mLs by mouth 4 times daily as needed for Cough 473 mL 0    Azelastine-Fluticasone (DYMISTA) 137-50 MCG/ACT SUSP 1 spray by Nasal route 2 times daily 1 Bottle 2    lidocaine (LIDODERM) 5 % Place 1 patch onto the skin every 12 hours 12 hours on, 12 hours off. 5 patch 0    tiZANidine (ZANAFLEX) 2 MG tablet Take 1 tablet by mouth every 8 hours as needed (spasms) 30 tablet 0    celecoxib (CELEBREX) 200 MG capsule Take 1 capsule by mouth daily 30 capsule 0    candesartan (ATACAND) 32 MG tablet Take 1 tablet by mouth daily 90 tablet 5    hydrochlorothiazide (HYDRODIURIL) 25 MG tablet Take 1 tablet by mouth every morning 90 tablet 5    montelukast (SINGULAIR) 10 MG tablet Take 1 tablet by mouth nightly 90 tablet 3    albuterol sulfate  (90 Base) MCG/ACT inhaler Inhale 2 puffs into the lungs every 4 hours as needed for Wheezing 1 Inhaler 6    fluticasone-salmeterol (ADVAIR DISKUS) 500-50 MCG/DOSE diskus inhaler Inhale 1 puff into the lungs every 12 hours 60 each 11     No current facility-administered medications for this visit. Allergies   Allergen Reactions    Chloroquine     Lisinopril      Coughing and choking    Mefloquine     Quinolones          Review of Systems:  A 14 point review of systems was completed by the patient and is available in the media section of the scanned medical record and was reviewed on 8/14/2020. The review is negative with the exception of those things mentioned in the HPI and Past Medical History   Review of Systems   Musculoskeletal: Positive for arthralgias and myalgias. Neurological: Positive for weakness (2/2 to muscle weakness) and numbness. Vital Signs:   Temp 98.1 °F (36.7 °C) (Infrared)   Ht 5' 5\" (1.651 m)   Wt 223 lb 15.8 oz (101.6 kg)   LMP 10/10/2014   BMI 37.27 kg/m²     General Exam:    General: Clara Sanchez is a extremely well-nourished and well appearing 64y.o. year old female who is sitting comfortably in our office in no acute distress. Neuro: Alert & Oriented x 3,  normal,  no focal deficits noted. Normal mood & affect.   Eyes: Sclera clear  Ears: Normal external ear  Mouth: Patient wearing a the patient was seated on the exam table with the right knee flexed to 90 degrees. The anterolateral aspect of the knee adjacent to the joint line was prepped with Chlora-prep. The skin and subcutaneous tissues were anesthetized with ethyl chloride spray. A 22-gauge needle was inserted into the right knee and 2 ml of 40 mg/ml DepoMedrol was injected. The needle was withdrawn and the puncture site sealed with a Band-Aid. The patient tolerated the procedure well. Post injection instructions and precautions given and any problems to notify us. Assessment: Patient is a 64 y.o. female presenting to the office for follow-up evaluation of her right knee pain and a corticosteroid injection. Patient has a diagnosis of osteoarthritis in both knees being treated with conservative therapy. Visit Diagnoses       Codes    Primary osteoarthritis of right knee    -  Primary M17.11    Neuralgia     M79.2    Right knee pain, unspecified chronicity     M25.561          Orders Placed This Encounter   Medications    Gabapentin 10 % CREA     Sig: Apply 1 cm2 topically 2 times daily as needed (if burning or electric sensation persists) Apply thin layer of cream over affected area. Wash hands after application. Stop use if side-effects occur and call your doctor. Dispense:  2 Tube     Refill:  1    methylPREDNISolone acetate (DEPO-MEDROL) injection 40 mg       Medical decision making:  Patient's workup and evaluation were reviewed with the patient in the office today. Patient was given a corticosteroid injection in her knee to help reduce pain and inflammation.   Patient is experiencing less relief with corticosteroid injections and I believe, with a reasonable degree of medical certainty, that is within the patient's best interest to utilize viscosupplementation injections to further decrease her osteoarthritic pain as she continues to decrease her weight and conduct other efforts to optimize herself for a future total knee replacement. In addition, the patient states the gabapentin cream has been working well to control the neuralgia on the lateral aspect of her right thigh. She was written for a refill for this medication. Patient was educated to continue conservative therapy as detailed in the treatment plan below. All the patient's questions were answered while in the clinic. The patient is understanding of all instructions and agrees with the plan. Patient does not smoke and did not require smoking cessation patient education. Treatment Plan:    1. Observe postinjection precautions  2. Activity modification  3. Ice 20 minutes ever 1-2 hours PRN  4. NSAIDs as needed  5. Rest   6. Elevation at least 2 inches above the heart with pillows supporting the joint underneath  7. Home exercises as instructed  8. Appropriate diet/weight management      Follow up: 3 months or PRN              Yahir Kidd PA-C    Physician Assistant - Certified  [facility] and [address] [place] [address]    08/14/20 3:48 PM      This dictation was performed with a verbal recognition program (DRAGON) and it was checked for errors. It is possible that there are still dictated errors within this office note. If so, please bring any errors to my attention for an addendum. All efforts were made to ensure that this office note is accurate.

## 2020-08-17 ENCOUNTER — TELEPHONE (OUTPATIENT)
Dept: PULMONOLOGY | Age: 60
End: 2020-08-17

## 2020-08-17 NOTE — TELEPHONE ENCOUNTER
Patient calling stating she is taking the cough medication as prescribed but still coughing day and night. She states it does not keep her up at night and she is not coughing up anything.   She mentioned she has taken Prednisone in the past.  Please advise

## 2020-08-18 RX ORDER — PREDNISONE 20 MG/1
20 TABLET ORAL DAILY
Qty: 7 TABLET | Refills: 0 | Status: SHIPPED | OUTPATIENT
Start: 2020-08-18 | End: 2020-08-25

## 2020-08-24 ENCOUNTER — HOSPITAL ENCOUNTER (EMERGENCY)
Age: 60
Discharge: HOME | End: 2020-08-24
Attending: EMERGENCY MEDICINE
Payer: COMMERCIAL

## 2020-08-24 ENCOUNTER — NURSE TRIAGE (OUTPATIENT)
Dept: OTHER | Facility: CLINIC | Age: 60
End: 2020-08-24

## 2020-08-24 VITALS
HEART RATE: 102 BPM | HEIGHT: 65 IN | SYSTOLIC BLOOD PRESSURE: 134 MMHG | RESPIRATION RATE: 20 BRPM | OXYGEN SATURATION: 98 % | BODY MASS INDEX: 40.82 KG/M2 | WEIGHT: 245 LBS | TEMPERATURE: 97.9 F | DIASTOLIC BLOOD PRESSURE: 82 MMHG

## 2020-08-24 PROCEDURE — 99283 EMERGENCY DEPT VISIT LOW MDM: CPT

## 2020-08-24 RX ORDER — CYCLOBENZAPRINE HCL 10 MG
10 TABLET ORAL ONCE
Status: DISCONTINUED | OUTPATIENT
Start: 2020-08-24 | End: 2020-08-24 | Stop reason: HOSPADM

## 2020-08-24 RX ORDER — LIDOCAINE 4 G/G
1 PATCH TOPICAL DAILY
Qty: 15 PATCH | Refills: 0 | Status: SHIPPED | OUTPATIENT
Start: 2020-08-24 | End: 2020-09-08

## 2020-08-24 RX ORDER — LIDOCAINE 4 G/G
1 PATCH TOPICAL DAILY
Status: DISCONTINUED | OUTPATIENT
Start: 2020-08-24 | End: 2020-08-24 | Stop reason: HOSPADM

## 2020-08-24 RX ORDER — CYCLOBENZAPRINE HCL 10 MG
10 TABLET ORAL NIGHTLY PRN
Qty: 30 TABLET | Refills: 0 | Status: SHIPPED | OUTPATIENT
Start: 2020-08-24 | End: 2020-09-23

## 2020-08-24 ASSESSMENT — PAIN DESCRIPTION - ORIENTATION: ORIENTATION: LEFT

## 2020-08-24 ASSESSMENT — PAIN DESCRIPTION - PAIN TYPE: TYPE: ACUTE PAIN

## 2020-08-24 ASSESSMENT — PAIN SCALES - GENERAL: PAINLEVEL_OUTOF10: 10

## 2020-08-24 ASSESSMENT — PAIN DESCRIPTION - LOCATION: LOCATION: HIP;BUTTOCKS

## 2020-08-24 ASSESSMENT — PAIN DESCRIPTION - DESCRIPTORS: DESCRIPTORS: ACHING

## 2020-08-24 ASSESSMENT — ENCOUNTER SYMPTOMS
GASTROINTESTINAL NEGATIVE: 1
EYES NEGATIVE: 1
RESPIRATORY NEGATIVE: 1

## 2020-08-24 NOTE — ED PROVIDER NOTES
[facility]  EMERGENCY DEPARTMENT ENCOUNTER          [facility] RESIDENT NOTE       Date of evaluation: 2020    Chief Complaint     Hip Pain      History of Present Illness     Юлия Barahona is a 64 y.o. female who presents chief complaint of left hip pain. Patient reports falling on Saturday. She reports slipping and falling down  1 stair. Her fall was braced by grabbing onto the railing. This was a mechanical fall. She reports a nagging pain in her left lateral hip since Saturday. It does not radiate. She currently rates the pain at a 10. It is relieved with rest.  It is exacerbated with movement. She has tried taking ice and Tylenol for the pain, however it is not provided adequate relief. She has been able to ambulate well. Been able to maintain bladder or bowel control. She has no change in motor function, sensory and denies numbness and tingling in her lower extremities. Review of Systems     Review of Systems   Constitutional: Negative. HENT: Negative. Eyes: Negative. Respiratory: Negative. Cardiovascular: Negative. Gastrointestinal: Negative. Genitourinary: Negative. Musculoskeletal:        Left hip/thigh pain   Skin: Negative. Neurological: Negative. Hematological: Negative. Psychiatric/Behavioral: Negative. Past Medical, Surgical, Family, and Social History     She has a past medical history of GERD (gastroesophageal reflux disease), Gestational diabetes, Hypertension, Morbid obesity due to excess calories ([address] Severe persistent asthma, Vitamin D deficiency, and Xerosis cutis. She has a past surgical history that includes  section (). Her family history is not on file. She reports that she has never smoked. She has never used smokeless tobacco. She reports that she does not drink alcohol or use drugs.     Medications     Previous Medications    ALBUTEROL SULFATE  (90 BASE) MCG/ACT INHALER    Inhale 2 puffs into the lungs are normal.      Palpations: Abdomen is soft. Musculoskeletal: Normal range of motion. General: Tenderness present. No deformity or signs of injury. Comments: Tender to palpation lateral left hip. There is no obvious deformity upon inspection. Skin:     General: Skin is warm. Capillary Refill: Capillary refill takes less than 2 seconds. Neurological:      General: No focal deficit present. Mental Status: She is alert. Diagnostic Results     RADIOLOGY:  No orders to display       LABS:   No results found for this visit on 08/24/20. ED BEDSIDE ULTRASOUND:      RECENT VITALS:  BP: 134/82, Temp: 97.9 °F (36.6 °C),Pulse: 102, Resp: 20, SpO2: 98 %     Procedures         ED Course     Nursing Notes, Past Medical Hx, Past Surgical Hx, Social Hx, Allergies, and FamilyHx were reviewed. The patient was giventhe following medications:  Orders Placed This Encounter   Medications    lidocaine 4 % external patch     Sig: Place 1 patch onto the skin daily for 15 days     Dispense:  15 patch     Refill:  0    cyclobenzaprine (FLEXERIL) tablet 10 mg    cyclobenzaprine (FLEXERIL) 10 MG tablet     Sig: Take 1 tablet by mouth nightly as needed for Muscle spasms     Dispense:  30 tablet     Refill:  0    lidocaine 4 % external patch 1 patch       CONSULTS:  None    MEDICAL DECISION MAKING / ASSESSMENT / Rola Jayashree is a 64 y.o. female presents the chief complaint of left hip pain after a fall. She has been able to ambulate well since the injury. At this time there is no indication for any further imaging as I do not suspect any fracture or dislocation at this time. She is educated on pain relief. She also reports muscle spasms, chronic in nature of her. She has been taken tizanidine, however is not been providing adequate relief for her. Pain is been managed here with Flexeril and lidocaine patches. She will also be discharged on this medication.   She has been told to stop

## 2020-08-24 NOTE — ED TRIAGE NOTES
[place] Para on some steps Saturday and came down on left hip and buttock,  Continues to have pain in that area

## 2020-08-24 NOTE — TELEPHONE ENCOUNTER
Reason for Disposition   Intermittent chest pain and pain has been increasing in severity or frequency    Answer Assessment - Initial Assessment Questions  1. LOCATION: \"Where does it hurt? \"        In breast area and under arms on sides pt is having squeezing spasms     2. RADIATION: \"Does the pain go anywhere else? \" (e.g., into neck, jaw, arms, back)       Denies pain in any other area     3. ONSET: \"When did the chest pain begin? \" (Minutes, hours or days)         About 4 days ago     4. PATTERN \"Does the pain come and go, or has it been constant since it started? \"  \"Does it get worse with exertion? \"         Intermittent     5. DURATION: \"How long does it last\" (e.g., seconds, minutes, hours)         Squeezing for 2 min at a time     6. SEVERITY: \"How bad is the pain? \"  (e.g., Scale 1-10; mild, moderate, or severe)     - MILD (1-3): doesn't interfere with normal activities      - MODERATE (4-7): interferes with normal activities or awakens from sleep     - SEVERE (8-10): excruciating pain, unable to do any normal activities                      8/10    7. CARDIAC RISK FACTORS: \"Do you have any history of heart problems or risk factors for heart disease? \" (e.g., prior heart attack, angina; high blood pressure, diabetes, being overweight, high cholesterol, smoking, or strong family history of heart disease)          Denies     8. PULMONARY RISK FACTORS: \"Do you have any history of lung disease? \"  (e.g., blood clots in lung, asthma, emphysema, birth control pills)          Asthma     9. CAUSE: \"What do you think is causing the chest pain? \"          10. OTHER SYMPTOMS: \"Do you have any other symptoms? \" (e.g., dizziness, nausea, vomiting, sweating, fever, difficulty breathing, cough)          Cough but is from asthma     11. PREGNANCY: \"Is there any chance you are pregnant? \" \"When was your last menstrual period? \"    Protocols used: CHEST PAIN-ADULT-[place]    [facility] to schedule appointment, with red flag complaint, transferred to RN access for triage. Caller reports symptoms as documented above. Caller informed of disposition. Soft transfer to [facility] in [facility] to schedule appt as well with PCP but [facility] unable to schedule at this time. Care advice as documented. Pt verbalized understanding and is agreeable to plan of ED. Please do not respond to the triage nurse through this encounter. Any subsequent communication should be directly with the patient.

## 2020-09-12 ENCOUNTER — HOSPITAL ENCOUNTER (EMERGENCY)
Age: 60
Discharge: HOME | End: 2020-09-12
Attending: EMERGENCY MEDICINE
Payer: COMMERCIAL

## 2020-09-12 VITALS
HEART RATE: 99 BPM | OXYGEN SATURATION: 98 % | WEIGHT: 245 LBS | RESPIRATION RATE: 16 BRPM | BODY MASS INDEX: 40.82 KG/M2 | SYSTOLIC BLOOD PRESSURE: 148 MMHG | TEMPERATURE: 97.5 F | DIASTOLIC BLOOD PRESSURE: 82 MMHG | HEIGHT: 65 IN

## 2020-09-12 PROCEDURE — 99282 EMERGENCY DEPT VISIT SF MDM: CPT

## 2020-09-12 RX ORDER — DIPHENHYDRAMINE HCL 25 MG
1 CAPSULE ORAL EVERY 4 HOURS PRN
Qty: 15 ML | Refills: 0 | Status: SHIPPED | OUTPATIENT
Start: 2020-09-12 | End: 2020-11-10

## 2020-09-12 RX ORDER — 0.9 % SODIUM CHLORIDE 0.9 %
1000 INTRAVENOUS SOLUTION INTRAVENOUS ONCE
Status: DISCONTINUED | OUTPATIENT
Start: 2020-09-12 | End: 2020-09-12

## 2020-09-12 RX ORDER — ERYTHROMYCIN 5 MG/G
OINTMENT OPHTHALMIC 3 TIMES DAILY
Qty: 3.5 G | Refills: 0 | Status: SHIPPED | OUTPATIENT
Start: 2020-09-12 | End: 2020-09-17

## 2020-09-12 RX ORDER — TETRACAINE HYDROCHLORIDE 5 MG/ML
1 SOLUTION OPHTHALMIC ONCE
Status: DISCONTINUED | OUTPATIENT
Start: 2020-09-12 | End: 2020-09-12 | Stop reason: HOSPADM

## 2020-09-12 ASSESSMENT — PAIN DESCRIPTION - PAIN TYPE: TYPE: ACUTE PAIN

## 2020-09-12 ASSESSMENT — PAIN DESCRIPTION - LOCATION: LOCATION: EYE

## 2020-09-12 ASSESSMENT — PAIN DESCRIPTION - ORIENTATION: ORIENTATION: LEFT;RIGHT

## 2020-09-12 ASSESSMENT — PAIN SCALES - GENERAL: PAINLEVEL_OUTOF10: 6

## 2020-09-13 NOTE — ED PROVIDER NOTES
[address] ENCOUNTER          PHYSICIAN ASSISTANT NOTE       Date of evaluation: 2020    Chief Complaint     Eye Pain (oil splashed in eyes, left worse than right )      History of Present Illness     Юлия Barahona is a 64 y.o. female with past medical history significant for hypertension, obesity, asthma who is not a contact lens wearer who presents with bilateral eye pain, left worse than right, secondary to cooking oil splashing into her eyes. Patient states this occurred around 1630 this evening. States she briefly splashed water in her eyes for 1 to 2 minutes and then applied ice to her eyelids. States that the pain has been progressively worsening, mainly to her left eye. Denies any vision changes. Denies any drainage from her eyes. Denies decreased range of motion of eyes or any other complaints. Patient states her tetanus is up-to-date. Review of Systems     Review of Systems   See HPI, all others negative. Past Medical, Surgical, Family, and Social History     She has a past medical history of GERD (gastroesophageal reflux disease), Gestational diabetes, Hypertension, Morbid obesity due to excess calories ([address] Severe persistent asthma, Vitamin D deficiency, and Xerosis cutis. She has a past surgical history that includes  section (). Her family history is not on file. She reports that she has never smoked. She has never used smokeless tobacco. She reports that she does not drink alcohol or use drugs.     Medications     Previous Medications    ALBUTEROL SULFATE  (90 BASE) MCG/ACT INHALER    Inhale 2 puffs into the lungs every 4 hours as needed for Wheezing    AZELASTINE-FLUTICASONE (DYMISTA) 137-50 MCG/ACT SUSP    1 spray by Nasal route 2 times daily    BROMPHENIRAMINE-PSEUDOEPHEDRINE-DM (BROMFED DM) 2-30-10 MG/5ML SYRUP    Take 5 mLs by mouth 4 times daily as needed for Cough    BROMPHENIRAMINE-PSEUDOEPHEDRINE-DM 2-30-10 MG/5ML SYRUP Take 5 mLs by mouth 4 times daily as needed for Cough    CANDESARTAN (ATACAND) 32 MG TABLET    Take 1 tablet by mouth daily    CELECOXIB (CELEBREX) 200 MG CAPSULE    Take 1 capsule by mouth daily    CYCLOBENZAPRINE (FLEXERIL) 10 MG TABLET    Take 1 tablet by mouth nightly as needed for Muscle spasms    FLUTICASONE-SALMETEROL (ADVAIR DISKUS) 500-50 MCG/DOSE DISKUS INHALER    Inhale 1 puff into the lungs every 12 hours    GABAPENTIN 10 % CREA    Apply 1 cm2 topically 2 times daily as needed (if burning or electric sensation persists) Apply thin layer of cream over affected area. Wash hands after application. Stop use if side-effects occur and call your doctor. HYDROCHLOROTHIAZIDE (HYDRODIURIL) 25 MG TABLET    Take 1 tablet by mouth every morning    LIDOCAINE (LIDODERM) 5 %    Place 1 patch onto the skin every 12 hours 12 hours on, 12 hours off. MONTELUKAST (SINGULAIR) 10 MG TABLET    Take 1 tablet by mouth nightly    TIZANIDINE (ZANAFLEX) 2 MG TABLET    Take 1 tablet by mouth every 8 hours as needed (spasms)       Allergies     She is allergic to chloroquine; lisinopril; mefloquine; and quinolones. Physical Exam     INITIAL VITALS: BP: (!) 148/82, Temp: 97.5 °F (36.4 °C), Pulse: 99, Resp: 16, SpO2: 98 %  Physical Exam  Constitutional:       Appearance: Normal appearance. Comments: Overweight -American female. HENT:      Head: Normocephalic and atraumatic. Nose: Nose normal.      Mouth/Throat:      Mouth: Mucous membranes are moist.   Eyes:      Extraocular Movements: Extraocular movements intact. Conjunctiva/sclera: Conjunctivae normal.      Pupils: Pupils are equal, round, and reactive to light. Comments: Mild diffuse floor seen uptake noted to bilateral cornea. Neck:      Musculoskeletal: Normal range of motion. Cardiovascular:      Rate and Rhythm: Normal rate. Pulmonary:      Effort: Pulmonary effort is normal.   Abdominal:      General: There is no distension. encouraged to follow-up with [facility] within the next week. Patient was given strict return precautions regarding development of vision changes or intractable pain. This patient was also evaluated by the attending physician. All care plans were discussed and agreed upon. Clinical Impression     1.  Eye pain, bilateral        Disposition     PATIENT REFERRED TO:  [address] [place] [address]  [phone]    Call   for follow-up within the next week      DISCHARGE MEDICATIONS:  New Prescriptions    DEXTRAN 70-HYPROMELLOSE (ARTIFICIAL TEARS) 0.1-0.3 % SOLN OPTHALMIC SOLUTION    Place 1 drop into both eyes every 4 hours as needed (burning eyes)    ERYTHROMYCIN (ROMYCIN) 5 MG/GM OPHTHALMIC OINTMENT    Place into both eyes 3 times daily for 5 days       DISPOSITION          Michael Juarez PA-C  09/12/20 [id number]

## 2020-09-13 NOTE — ED PROVIDER NOTES
ED Attending Attestation Note     Date of evaluation: 9/12/2020    This patient was seen by the advance practice provider. I have seen and examined the patient, agree with the workup, evaluation, management and diagnosis. The care plan has been discussed. My assessment reveals complaints of bilateral eye pain after having hot oil splashed in her eye 3 hours ago. Patient has no conjunctival injection on exam.  Will check fluorescein staining.       Tohsia Freeman MD  09/12/20 [id number]

## 2020-09-13 NOTE — ED TRIAGE NOTES
Pt states that at [facility] [address] she was cooking and the food dropped into hot oil, splashing both her eyes. Pt complains of pain to left and right eyes.

## 2020-09-26 ENCOUNTER — HOSPITAL ENCOUNTER (EMERGENCY)
Age: 60
Discharge: HOME | End: 2020-09-26
Attending: EMERGENCY MEDICINE
Payer: COMMERCIAL

## 2020-09-26 ENCOUNTER — APPOINTMENT (OUTPATIENT)
Dept: GENERAL RADIOLOGY | Age: 60
End: 2020-09-26
Payer: COMMERCIAL

## 2020-09-26 VITALS
RESPIRATION RATE: 14 BRPM | OXYGEN SATURATION: 97 % | HEART RATE: 90 BPM | TEMPERATURE: 98.3 F | SYSTOLIC BLOOD PRESSURE: 183 MMHG | DIASTOLIC BLOOD PRESSURE: 95 MMHG

## 2020-09-26 PROCEDURE — 99283 EMERGENCY DEPT VISIT LOW MDM: CPT

## 2020-09-26 PROCEDURE — 73562 X-RAY EXAM OF KNEE 3: CPT

## 2020-09-26 ASSESSMENT — PAIN DESCRIPTION - PAIN TYPE: TYPE: ACUTE PAIN

## 2020-09-26 ASSESSMENT — PAIN DESCRIPTION - LOCATION: LOCATION: LEG;KNEE;FOOT

## 2020-09-26 ASSESSMENT — PAIN DESCRIPTION - ORIENTATION: ORIENTATION: RIGHT

## 2020-09-27 NOTE — ED PROVIDER NOTES
[facility] RESIDENT NOTE       Date of evaluation: 2020    Chief Complaint     Leg Pain (c/o pain in her behind her right knee and right foot that started on thursday )      History of Present Illness     Юлия Keys is a 64 y.o. female the past medical history of osteoarthritis of the right knee being managed conservatively with every 3 month steroid injections who presents with right knee pain. She states she has had 2 days of knee pain primarily in the posterior aspect of the knee joint with some intermittent cramping in her lower leg. She denies any recent trauma, falls, twisting of the knee. She denies any worsening swelling, rash, fevers. 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 She has tried her home medications of topical gabapentin, NSAIDs, Tylenol without improvement in symptoms. Other than stated above, no additional aggravating or alleviating factors are noted. Review of Systems     Positive for knee pain, difficulty ambulating. Negative for sensation changes, weakness, falls, fevers, chills, chest pain, shortness of breath. All other systems reviewed and are negative except as mentioned in HPI. Past Medical, Surgical, Family, and Social History     She has a past medical history of GERD (gastroesophageal reflux disease), Gestational diabetes, Hypertension, Morbid obesity due to excess calories ([address] Severe persistent asthma, Vitamin D deficiency, and Xerosis cutis. She has a past surgical history that includes  section (). Her family history is not on file. She reports that she has never smoked. She has never used smokeless tobacco. She reports that she does not drink alcohol or use drugs.     Medications     Discharge Medication List as of 2020  9:54 PM      CONTINUE these medications which have NOT CHANGED    Details   dextran 70-hypromellose (ARTIFICIAL TEARS) 0.1-0.3 % SOLN opthalmic solution Place 1 drop into both eyes every 4 hours as needed (burning eyes), Disp-15 mL,R-0Print      Gabapentin 10 % CREA Apply 1 cm2 topically 2 times daily as needed (if burning or electric sensation persists) Apply thin layer of cream over affected area. Wash hands after application. Stop use if side-effects occur and call your doctor., Disp-2 Tube,R-1Print      !! brompheniramine-pseudoephedrine-DM (BROMFED DM) 2-30-10 MG/5ML syrup Take 5 mLs by mouth 4 times daily as needed for Cough, Disp-473 mL,R-0Normal      !! brompheniramine-pseudoephedrine-DM 2-30-10 MG/5ML syrup Take 5 mLs by mouth 4 times daily as needed for Cough, Disp-473 mL, R-0Normal      Azelastine-Fluticasone (DYMISTA) 137-50 MCG/ACT SUSP 1 spray by Nasal route 2 times daily, Disp-1 Bottle, R-2Normal      lidocaine (LIDODERM) 5 % Place 1 patch onto the skin every 12 hours 12 hours on, 12 hours off., Disp-5 patch, R-0Please direct patient to OTC Lidoderm patches if her insurance doesn't cover the medication. Normal      tiZANidine (ZANAFLEX) 2 MG tablet Take 1 tablet by mouth every 8 hours as needed (spasms), Disp-30 tablet, R-0Normal      celecoxib (CELEBREX) 200 MG capsule Take 1 capsule by mouth daily, Disp-30 capsule, R-0Normal      candesartan (ATACAND) 32 MG tablet Take 1 tablet by mouth daily, Disp-90 tablet, R-5Normal      hydrochlorothiazide (HYDRODIURIL) 25 MG tablet Take 1 tablet by mouth every morning, Disp-90 tablet, R-5Normal      montelukast (SINGULAIR) 10 MG tablet Take 1 tablet by mouth nightly, Disp-90 tablet, R-3Normal      albuterol sulfate  (90 Base) MCG/ACT inhaler Inhale 2 puffs into the lungs every 4 hours as needed for Wheezing, Disp-1 Inhaler, R-6Normal      fluticasone-salmeterol (ADVAIR DISKUS) 500-50 MCG/DOSE diskus inhaler Inhale 1 puff into the lungs every 12 hours, Disp-60 each, R-11Normal       !! - Potential duplicate medications found.  Please discuss with provider. Allergies     She is allergic to chloroquine; lisinopril; mefloquine; and quinolones. Physical Exam     INITIAL VITALS: BP: (!) 183/95, Temp: 98.3 °F (36.8 °C), Pulse: 90, Resp: 14, SpO2: 97 %   General:  Well appearing. No acute distress  Eyes:  PERRL. No discharge from eyes   ENT:  No discharge from nose. OP clear  Neck:  Supple, trachea midline  Pulmonary:   Non-labored breathing. Breath sounds clear bilaterally  Cardiac:  Regular rate and rhythm. No murmurs  Abdomen:  Soft. Non-distended. Non-tender. Musculoskeletal:  No long bone deformity. Tenderness to palpation greatest over the posterior aspect of the knee, full range of motion of the knee passively and actively. No overlying erythema, edema, warmth. No palpable joint effusion. Some tenderness to palpation additionally along the posterior aspect of the right knee. Vascular:  Extremities warm and perfused. Normal pulses in all 4 extremities  Skin:  Dry, no rashes  Extremities:  No peripheral edema  Neuro: Alert. Moves all four extremities to command. Sensation grossly intact to light touch. Speech and mentation normal. No focal deficit. Gait narrow and stable. Diagnostic Results     EKG   No EKG performed    RADIOLOGY:  XR KNEE RIGHT (3 VIEWS)   Final Result   1. Osteoarthritis. 2. No evidence of fracture or dislocation      VL Extremity Venous Right    (Results Pending)       LABS:   No results found for this visit on 09/26/20. ED BEDSIDE ULTRASOUND:      RECENT VITALS:  BP: (!) 183/95, Temp: 98.3 °F (36.8 °C), Pulse: 90,Resp: 14, SpO2: 97 %     Procedures         ED Course     Nursing Notes, Past Medical Hx, Past Surgical Hx, Social Hx, Allergies, and Family Hx were reviewed. The patient was given the followingmedications:  No orders of the defined types were placed in this encounter.       CONSULTS:  None    MEDICAL DECISION MAKING / ASSESSMENT / Man Ha is a 64 y.o. female with a history and presentation as described above in HPI. The patient was evaluated by myself and the ED Attending Physician, Dr. Coco Kumar. All management and disposition plans were discussed and agreed upon. Upon presentation, the patient was well appearing and had stable vitals. Differential diagnosis includes but is not limited to septic joint, fracture, DVT, joint effusion, cellulitis, worsening OA. Low concern for septic joint based on lack of fever, overriding erythema, warmth to the touch, toxic appearance to the patient, significant pain limiting range of motion. Additionally, lower suspicion for joint effusion based on the lack of palpable fluid collection on exam. Low concern for other skin or soft tissue infection and will defer antibiotics at this time. X-ray negative for acute fracture. The patient does not have significant risk factors for DVT and has a low risk Wells score. Additionally, low concern for PE at this time based on lack of shortness of breath, lack of chest pain, hypoxia, tachycardia or tachypnea. Given the patient's stability, feel that outpatient work-up is okay at this time and I will order an outpatient duplex of the right lower extremity and instruct the patient to call first thing Monday morning to schedule an appointment. I had a discussion with her regarding the signs and symptoms to look out for for worsening symptoms concerning for DVT and propagation to PE. She expressed understanding and was agreeable to this plan. I also instructed her to have close follow-up with her orthopedist for continued evaluation. Discharge: At this time, the patient was deemed appropriate for discharge. Workup, treatment and diagnosis were discussed with the patient and/or family members; the patient agrees to the plan and all questions were addressed and answered.  My customary discharge instructions, including strict return precautions for new or worsening symptoms or any concern she believes warrants acute physician evaluation, were provided. she was subsequently sent home in stable/improved condition    Medications received during this ED visit:    Medications - No data to display    Clinical Impression     1. Right leg pain        Disposition     PATIENT REFERRED TO:  No follow-up provider specified.     DISCHARGE MEDICATIONS:  Discharge Medication List as of 9/26/2020  9:54 PM          DISPOSITION Decision To Discharge 09/26/2020 09:33:31 PM        Adeola Leon MD  Resident  09/27/20 0523

## 2020-09-30 ENCOUNTER — HOSPITAL ENCOUNTER (OUTPATIENT)
Dept: VASCULAR LAB | Age: 60
Discharge: HOME | End: 2020-09-30
Payer: COMMERCIAL

## 2020-09-30 PROCEDURE — 93971 EXTREMITY STUDY: CPT

## 2020-10-12 ENCOUNTER — TELEPHONE (OUTPATIENT)
Dept: PULMONOLOGY | Age: 60
End: 2020-10-12

## 2020-10-12 RX ORDER — PREDNISONE 20 MG/1
40 TABLET ORAL DAILY
Qty: 10 TABLET | Refills: 0 | Status: SHIPPED | OUTPATIENT
Start: 2020-10-12 | End: 2020-10-17

## 2020-10-12 NOTE — TELEPHONE ENCOUNTER
Complains of cough   Duration : started over the weekend   Cough with sputum production? No     Fever? no  Any other Symptoms? None   Any current treatment tried? Singulair & Inhaler   Using inhalers? Yes  do they help? Yes   Pharmacy?  [facility]  Wants Prednisone

## 2020-10-26 ENCOUNTER — APPOINTMENT (OUTPATIENT)
Dept: GENERAL RADIOLOGY | Age: 60
End: 2020-10-26
Payer: COMMERCIAL

## 2020-10-26 ENCOUNTER — HOSPITAL ENCOUNTER (EMERGENCY)
Age: 60
Discharge: HOME | End: 2020-10-26
Attending: EMERGENCY MEDICINE
Payer: COMMERCIAL

## 2020-10-26 VITALS
WEIGHT: 240 LBS | SYSTOLIC BLOOD PRESSURE: 197 MMHG | HEART RATE: 81 BPM | TEMPERATURE: 98 F | HEIGHT: 65 IN | DIASTOLIC BLOOD PRESSURE: 107 MMHG | BODY MASS INDEX: 39.99 KG/M2 | OXYGEN SATURATION: 100 % | RESPIRATION RATE: 16 BRPM

## 2020-10-26 PROCEDURE — 73630 X-RAY EXAM OF FOOT: CPT

## 2020-10-26 PROCEDURE — 96372 THER/PROPH/DIAG INJ SC/IM: CPT

## 2020-10-26 PROCEDURE — 6360000002 HC RX W HCPCS: Performed by: EMERGENCY MEDICINE

## 2020-10-26 PROCEDURE — 99283 EMERGENCY DEPT VISIT LOW MDM: CPT

## 2020-10-26 RX ORDER — NAPROXEN 500 MG/1
500 TABLET ORAL 2 TIMES DAILY WITH MEALS
Qty: 28 TABLET | Refills: 0 | Status: SHIPPED | OUTPATIENT
Start: 2020-10-26 | End: 2020-12-31 | Stop reason: SINTOL

## 2020-10-26 RX ORDER — KETOROLAC TROMETHAMINE 30 MG/ML
15 INJECTION, SOLUTION INTRAMUSCULAR; INTRAVENOUS ONCE
Status: COMPLETED | OUTPATIENT
Start: 2020-10-26 | End: 2020-10-26

## 2020-10-26 RX ADMIN — KETOROLAC TROMETHAMINE 15 MG: 30 INJECTION, SOLUTION INTRAMUSCULAR at 17:32

## 2020-10-26 ASSESSMENT — PAIN DESCRIPTION - ORIENTATION: ORIENTATION: LEFT

## 2020-10-26 ASSESSMENT — PAIN SCALES - GENERAL
PAINLEVEL_OUTOF10: 8
PAINLEVEL_OUTOF10: 8

## 2020-10-26 ASSESSMENT — PAIN DESCRIPTION - LOCATION: LOCATION: FOOT

## 2020-10-26 ASSESSMENT — PAIN DESCRIPTION - PAIN TYPE: TYPE: ACUTE PAIN

## 2020-10-26 NOTE — ED TRIAGE NOTES
Pt here today for left heel pain. Pt states caught her fall with her left heel.  Pt states this happened yesterday pt states she did not take her BP medication this am.

## 2020-10-26 NOTE — ED PROVIDER NOTES
(2002). Her family history is not on file. She reports that she has never smoked. She has never used smokeless tobacco. She reports that she does not drink alcohol or use drugs. Medications     Discharge Medication List as of 10/26/2020  6:31 PM      CONTINUE these medications which have NOT CHANGED    Details   dextran 70-hypromellose (ARTIFICIAL TEARS) 0.1-0.3 % SOLN opthalmic solution Place 1 drop into both eyes every 4 hours as needed (burning eyes), Disp-15 mL,R-0Print      Gabapentin 10 % CREA Apply 1 cm2 topically 2 times daily as needed (if burning or electric sensation persists) Apply thin layer of cream over affected area. Wash hands after application. Stop use if side-effects occur and call your doctor., Disp-2 Tube,R-1Print      !! brompheniramine-pseudoephedrine-DM (BROMFED DM) 2-30-10 MG/5ML syrup Take 5 mLs by mouth 4 times daily as needed for Cough, Disp-473 mL,R-0Normal      !! brompheniramine-pseudoephedrine-DM 2-30-10 MG/5ML syrup Take 5 mLs by mouth 4 times daily as needed for Cough, Disp-473 mL, R-0Normal      Azelastine-Fluticasone (DYMISTA) 137-50 MCG/ACT SUSP 1 spray by Nasal route 2 times daily, Disp-1 Bottle, R-2Normal      lidocaine (LIDODERM) 5 % Place 1 patch onto the skin every 12 hours 12 hours on, 12 hours off., Disp-5 patch, R-0Please direct patient to OTC Lidoderm patches if her insurance doesn't cover the medication. Normal      tiZANidine (ZANAFLEX) 2 MG tablet Take 1 tablet by mouth every 8 hours as needed (spasms), Disp-30 tablet, R-0Normal      candesartan (ATACAND) 32 MG tablet Take 1 tablet by mouth daily, Disp-90 tablet, R-5Normal      hydrochlorothiazide (HYDRODIURIL) 25 MG tablet Take 1 tablet by mouth every morning, Disp-90 tablet, R-5Normal      montelukast (SINGULAIR) 10 MG tablet Take 1 tablet by mouth nightly, Disp-90 tablet, R-3Normal      albuterol sulfate  (90 Base) MCG/ACT inhaler Inhale 2 puffs into the lungs every 4 hours as needed for Wheezing, Disp-1 Inhaler, R-6Normal      fluticasone-salmeterol (ADVAIR DISKUS) 500-50 MCG/DOSE diskus inhaler Inhale 1 puff into the lungs every 12 hours, Disp-60 each, R-11Normal       !! - Potential duplicate medications found. Please discuss with provider. Allergies     She is allergic to chloroquine; lisinopril; mefloquine; and quinolones. Physical Exam     INITIAL VITALS: BP: (!) 197/107, Temp: 98 °F (36.7 °C), Pulse: 81, Resp: 16, SpO2: 100 %     General: Well appearing obese patient. Pleasantly conversational, and in NAD. HEENT: Pupils are equal, round, and reactive to light. Extraocular muscles are intact. Conjunctivae are clear and moist. No redness or drainage from the eyes. Mask is in place. Neck: Supple, with full range of motion. Cardiovascular: 2+ radial pulses bilaterally. 2+ DP pulses bilaterally. Respiratory: Unlabored breathing with equal chest rise and fall. Skin: Warm and dry, without rashes or ecchymoses, lacerations or abrasions. Neuro: Alert and oriented x3. No focal neurologic deficits are noted. Extremities: Warm and well-perfused. The patient moves all extremities equally. On focused examination of the left lower extremity, there is no edema or deformity noted. The patient has mild to moderate tenderness to palpation over the posterior aspect of the calcaneus, and in the area of the insertion of the Achilles tendon on the calcaneus. There is no medial or lateral malleolus tenderness to palpation, or tenderness in the mid or forefoot. Psych: The patient's mood and affect are generally within normal limits for their presentation. Diagnostic Results       RADIOLOGY:  XR FOOT LEFT (MIN 3 VIEWS)   Final Result         1. Mild osteoarthrosis is present at the first metatarsophalangeal joint. LABS:   No results found for this visit on 10/26/20.       RECENT VITALS:  BP: (!) 197/107, Temp: 98 °F (36.7 °C), Pulse: 81, Resp: 16, SpO2: 100 % Procedures       ED Course     Nursing Notes, Past Medical Hx, Past Surgical Hx, Social Hx, Allergies, and Family Hx were reviewed. The patient was given the following medications:  Orders Placed This Encounter   Medications    ketorolac (TORADOL) injection 15 mg    naproxen (NAPROSYN) 500 MG tablet     Sig: Take 1 tablet by mouth 2 times daily (with meals)     Dispense:  28 tablet     Refill:  0       CONSULTS:  None    MEDICAL DECISION MAKING / ASSESSMENT / David Paulina is a 64 y.o. female who presents with left heel pain after she slipped and hit her heel on the stairs 2 days ago. There are no abnormalities seen or palpated on examination, although she does have some tenderness over the posterior aspect of the heel and at the insertion of the Achilles tendon. And in function is intact. Plain films show no bony abnormality in the area of interest.  She is noted to have had a traumatic Achilles tendinitis under somewhat similar circumstances in January. She was given a dose of Toradol in the emergency department with moderate improvement of her discomfort, and given the recurrent injury and discomfort to that area, was given a referral to podiatry for further evaluation and management. .      Clinical Impression     1. Pain of left heel        Disposition     PATIENT REFERRED TO:  [facility]  [address] [facility]  [address]    Call in 1 day  to discuss your ER visit, and arrange a follow-up appointment      DISCHARGE MEDICATIONS:  Discharge Medication List as of 10/26/2020  6:31 PM      START taking these medications    Details   naproxen (NAPROSYN) 500 MG tablet Take 1 tablet by mouth 2 times daily (with meals), Disp-28 tablet,R-0Print             DISPOSITION Decision To Discharge    (Please note that portions of this note were completed with voice recognition software.   Efforts were made to edit the dictations but occasionally words are mis-transcribed.)     Van Parrish MD  10/26/20 5721

## 2020-10-27 ENCOUNTER — TELEPHONE (OUTPATIENT)
Dept: PULMONOLOGY | Age: 60
End: 2020-10-27

## 2020-10-27 RX ORDER — BROMPHENIRAMINE MALEATE, PSEUDOEPHEDRINE HYDROCHLORIDE, AND DEXTROMETHORPHAN HYDROBROMIDE 2; 30; 10 MG/5ML; MG/5ML; MG/5ML
5 SYRUP ORAL 4 TIMES DAILY PRN
Qty: 118 ML | Refills: 0 | Status: SHIPPED | OUTPATIENT
Start: 2020-10-27 | End: 2020-11-10

## 2020-10-27 NOTE — TELEPHONE ENCOUNTER
Does she have any of the Bromfed-DM cough syrup left? If so, is she using it? Is she taking Singulair, Advair and Dymista nasal spray?

## 2020-10-27 NOTE — TELEPHONE ENCOUNTER
Patient said that it was about 2 months ago and has finished it. She is taking Singulair, Advair, and  Dymista when her nose is stuffed up or when she is coughing.

## 2020-10-27 NOTE — TELEPHONE ENCOUNTER
I have sent a prescription for Bromfed cough syrup. I would also like her to use Dymista twice a day every day.   Please give her an appointment to see me on 11/10 at 2 PM.

## 2020-11-02 ENCOUNTER — TELEPHONE (OUTPATIENT)
Dept: PULMONOLOGY | Age: 60
End: 2020-11-02

## 2020-11-10 ENCOUNTER — HOSPITAL ENCOUNTER (OUTPATIENT)
Age: 60
Discharge: HOME | End: 2020-11-10
Payer: COMMERCIAL

## 2020-11-10 ENCOUNTER — HOSPITAL ENCOUNTER (OUTPATIENT)
Dept: GENERAL RADIOLOGY | Age: 60
Discharge: HOME | End: 2020-11-10
Payer: COMMERCIAL

## 2020-11-10 ENCOUNTER — OFFICE VISIT (OUTPATIENT)
Dept: PULMONOLOGY | Age: 60
End: 2020-11-10
Payer: COMMERCIAL

## 2020-11-10 VITALS
RESPIRATION RATE: 12 BRPM | HEART RATE: 84 BPM | DIASTOLIC BLOOD PRESSURE: 80 MMHG | HEIGHT: 65 IN | OXYGEN SATURATION: 96 % | BODY MASS INDEX: 42.55 KG/M2 | WEIGHT: 255.4 LBS | TEMPERATURE: 99 F | SYSTOLIC BLOOD PRESSURE: 150 MMHG

## 2020-11-10 PROCEDURE — 99214 OFFICE O/P EST MOD 30 MIN: CPT | Performed by: INTERNAL MEDICINE

## 2020-11-10 PROCEDURE — 71046 X-RAY EXAM CHEST 2 VIEWS: CPT

## 2020-11-10 RX ORDER — BENZONATATE 100 MG/1
100 CAPSULE ORAL 3 TIMES DAILY PRN
Qty: 90 CAPSULE | Refills: 0 | Status: SHIPPED | OUTPATIENT
Start: 2020-11-10 | End: 2020-12-21 | Stop reason: SDUPTHER

## 2020-11-10 RX ORDER — BROMPHENIRAMINE MALEATE, PSEUDOEPHEDRINE HYDROCHLORIDE, AND DEXTROMETHORPHAN HYDROBROMIDE 2; 30; 10 MG/5ML; MG/5ML; MG/5ML
5 SYRUP ORAL 4 TIMES DAILY PRN
Qty: 473 ML | Refills: 1 | Status: SHIPPED | OUTPATIENT
Start: 2020-11-10 | End: 2020-12-17 | Stop reason: SDUPTHER

## 2020-11-10 RX ORDER — OMEPRAZOLE 20 MG/1
20 CAPSULE, DELAYED RELEASE ORAL DAILY
Qty: 30 CAPSULE | Refills: 1 | Status: SHIPPED | OUTPATIENT
Start: 2020-11-10 | End: 2021-02-09

## 2020-11-10 ASSESSMENT — ASTHMA QUESTIONNAIRES
ACT_TOTALSCORE: 18
QUESTION_3 LAST FOUR WEEKS HOW OFTEN DID YOUR ASTHMA SYMPTOMS (WHEEZING, COUGHING, SHORTNESS OF BREATH, CHEST TIGHTNESS OR PAIN) WAKE YOU UP AT NIGHT OR EARLIER THAN USUAL IN THE MORNING: 4
QUESTION_5 LAST FOUR WEEKS HOW WOULD YOU RATE YOUR ASTHMA CONTROL: 2
QUESTION_4 LAST FOUR WEEKS HOW OFTEN HAVE YOU USED YOUR RESCUE INHALER OR NEBULIZER MEDICATION (SUCH AS ALBUTEROL): 3
QUESTION_1 LAST FOUR WEEKS HOW MUCH OF THE TIME DID YOUR ASTHMA KEEP YOU FROM GETTING AS MUCH DONE AT WORK, SCHOOL OR AT HOME: 5
QUESTION_2 LAST FOUR WEEKS HOW OFTEN HAVE YOU HAD SHORTNESS OF BREATH: 4

## 2020-11-10 NOTE — PROGRESS NOTES
bilaterally, no accessory muscle use  Cardiovascular: Regular rate and rhythm, no appreciable murmurs. No edema. Gastrointestinal: Soft, non-tender. No hernia  Skin: Warm and dry. No rashes or ulcers on visible areas. Normal texture and turgor  Lymphatic: No cervical LAD. No supraclavicular LAD. Musculoskeletal: No cyanosis, clubbing or joint deformity. Psychiatric: Normal mood and affect; exhibits normal insight and judgement     Data reviewed:  Pulmonary Function Testing (9/16/19)  FVC 1.98 L at 76% predicted ---> 1.9L at 74% predicted  FEV1 1.74 L at 85% predicted ----> 1.72L at 84% predicted  FEV1/FVC ratio at 88% ---->91% predicted  TLC 4.27 L at 88% predicted  DLCO 25.12 at 104% predicted    Methacholine challenge: Positive at 16mg/ml    Overall: Normal flow measurements without significant reversal; normal diffusing capacity; methacholine challenge shows borderline bronchial hyperreactivity    Images and reports of chest imaging were reviewed by me. My interpretation is:  CXR (11/10/20): Clear lung fields      Lab Results   Component Value Date    WBC 4.9 12/30/2019    HGB 13.6 12/30/2019    HCT 40.8 12/30/2019    MCV 85.8 12/30/2019     12/30/2019       No results found for: BNP    Lab Results   Component Value Date    CREATININE 0.8 12/30/2019    BUN 22 (H) 12/30/2019     12/30/2019    K 4.4 12/30/2019     12/30/2019    CO2 25 12/30/2019   Allergy panel: Negative  IgE- 32      Assessment/Plan:64y.o. year old female presents for follow up. Cough- Likely multifactorial: asthma, upper airway cough syndrome and GERD. Will begin [employer] which I have asked her to use for about a week. We discussed that if there is no improvement in her cough she should begin taking Prilosec 20 mg daily. She is to continue Singulair nightly and Bromfed-DM as needed. Asthma- Continue Advair twice daily with albuterol inhaler as needed.     Post-nasal drainage- Continue Dymista as

## 2020-11-19 ENCOUNTER — OFFICE VISIT (OUTPATIENT)
Dept: ORTHOPEDIC SURGERY | Age: 60
End: 2020-11-19
Payer: COMMERCIAL

## 2020-11-19 VITALS — BODY MASS INDEX: 42.53 KG/M2 | HEIGHT: 65 IN | TEMPERATURE: 97.2 F | WEIGHT: 255.29 LBS

## 2020-11-19 PROCEDURE — 20610 DRAIN/INJ JOINT/BURSA W/O US: CPT | Performed by: ORTHOPAEDIC SURGERY

## 2020-11-19 PROCEDURE — 99213 OFFICE O/P EST LOW 20 MIN: CPT | Performed by: ORTHOPAEDIC SURGERY

## 2020-11-19 RX ORDER — METHYLPREDNISOLONE ACETATE 40 MG/ML
40 INJECTION, SUSPENSION INTRA-ARTICULAR; INTRALESIONAL; INTRAMUSCULAR; SOFT TISSUE ONCE
Status: COMPLETED | OUTPATIENT
Start: 2020-11-19 | End: 2020-11-19

## 2020-11-19 RX ADMIN — METHYLPREDNISOLONE ACETATE 40 MG: 40 INJECTION, SUSPENSION INTRA-ARTICULAR; INTRALESIONAL; INTRAMUSCULAR; SOFT TISSUE at 17:07

## 2020-11-19 NOTE — PROGRESS NOTES
[facility]  Office Visit  New Patient  Date:  2020    Name:  Carlitos Barrett  Address:  [address] [place] [address]    :  1960      Age:   64 y.o.    SSN:  [id number]      Medical Record Number:  <[id number]>    Chief Complaint:    Right knee pain    HPI:   Carlitos Barrett is a 64 y.o. female who presents for evaluation of right knee pain. Patient does have right knee osteoarthritis which has been treated conservatively including intermittent anti-inflammatory medications, and steroid injections her last being in August of this year. She states she had good relief with the steroid injection however has begun to wear off. She would like to try a Visco injection today. She complains mostly of medial sided knee pain however she does have significant pain in the posterior aspect of her knee and that she states feels like a fullness. She has difficulty with prolonged standing and ambulation. Of note she is beginning to have left knee pain again on the medial aspect of the knee. She denies any new numbness, tingling, fevers, chills, chest pain, shortness of breath, or any other new significant symptoms.     Pain Assessment  Location of Pain: Knee  Location Modifiers: Right  Severity of Pain: 8  Quality of Pain: Aching  Duration of Pain: Persistent  Frequency of Pain: Constant  Aggravating Factors: Walking, Other (Comment)(sitting)  Relieving Factors: Rest  Result of Injury: No  Work-Related Injury: No  Are there other pain locations you wish to document?: No    Past History:  Past Medical History:   Diagnosis Date    GERD (gastroesophageal reflux disease)     Gestational diabetes     Hypertension     Morbid obesity due to excess calories ([address] 2016    Severe persistent asthma 2019    Vitamin D deficiency     Xerosis cutis        Past Surgical History:   Procedure Laterality Date     SECTION         Social History Tobacco Use    Smoking status: Never Smoker    Smokeless tobacco: Never Used   Substance Use Topics    Alcohol use: No     Alcohol/week: 0.0 standard drinks    Drug use: No        Family History:  family history is not on file. Current Outpatient Medications:     benzonatate (TESSALON) 100 MG capsule, Take 1 capsule by mouth 3 times daily as needed for Cough, Disp: 90 capsule, Rfl: 0    brompheniramine-pseudoephedrine-DM (BROMFED DM) 2-30-10 MG/5ML syrup, Take 5 mLs by mouth 4 times daily as needed for Congestion or Cough, Disp: 473 mL, Rfl: 1    omeprazole (PRILOSEC) 20 MG delayed release capsule, Take 1 capsule by mouth Daily, Disp: 30 capsule, Rfl: 1    fluticasone-salmeterol (ADVAIR) 500-50 MCG/DOSE diskus inhaler, Inhale 1 puff into the lungs every 12 hours, Disp: 1 Inhaler, Rfl: 3    naproxen (NAPROSYN) 500 MG tablet, Take 1 tablet by mouth 2 times daily (with meals) (Patient taking differently: Take 500 mg by mouth 2 times daily (with meals) As needed), Disp: 28 tablet, Rfl: 0    Gabapentin 10 % CREA, Apply 1 cm2 topically 2 times daily as needed (if burning or electric sensation persists) Apply thin layer of cream over affected area. Wash hands after application.  Stop use if side-effects occur and call your doctor., Disp: 2 Tube, Rfl: 1    Azelastine-Fluticasone (DYMISTA) 137-50 MCG/ACT SUSP, 1 spray by Nasal route 2 times daily, Disp: 1 Bottle, Rfl: 2    lidocaine (LIDODERM) 5 %, Place 1 patch onto the skin every 12 hours 12 hours on, 12 hours off., Disp: 5 patch, Rfl: 0    tiZANidine (ZANAFLEX) 2 MG tablet, Take 1 tablet by mouth every 8 hours as needed (spasms) (Patient taking differently: Take 2 mg by mouth every 8 hours as needed (spasms) As needed), Disp: 30 tablet, Rfl: 0    candesartan (ATACAND) 32 MG tablet, Take 1 tablet by mouth daily, Disp: 90 tablet, Rfl: 5    montelukast (SINGULAIR) 10 MG tablet, Take 1 tablet by mouth nightly, Disp: 90 tablet, Rfl: 3    albuterol sulfate  (90 Base) MCG/ACT inhaler, Inhale 2 puffs into the lungs every 4 hours as needed for Wheezing, Disp: 1 Inhaler, Rfl: 6      Allergies   Allergen Reactions    Chloroquine     Lisinopril      Coughing and choking    Mefloquine     Quinolones          Review of Systems: A 14 point review of systems available in the scanned medical record as documented by the patient on 11/19/20. Today's review pertinent items are noted in HPI. No notes on file    Physical Exam:  Temp 97.2 °F (36.2 °C) (Infrared)   Ht 5' 5\" (1.651 m)   Wt 255 lb 4.7 oz (115.8 kg)   LMP 10/10/2014   BMI 42.48 kg/m²     General: No acute distress, well nourished  CV: No obvious peripheral edema. Normal peripheral pulses  Neuro: Alert & oriented x 3  Psych: Normal mood and affect    Knee Examination:  Right     Inspection: No edema or effusion, tenderness to palpation posteriorly, no obvious fluid collection or cyst palpated  Alignment: Normal  Palpation: There is  mild patellofemoral crepitus, there is  medial joint line tenderness. Range of Motion:   0-110  Strength: Motor is grossly intact  Stability: Knee stable varus valgus testing at 0 and 30 degrees, negative Lachman, negative posterior drawer  Gait:  Normal  Neuro: Sensation equal bilateral lower extremities   Vascular: 2+ posterior tibialis pulse        Contralateral Knee Comparison Examination:  Left     Inspection:  No edema or effusion  Alignment: Normal  Palpation: There is  minimal patellofemoral crepitus, there is  medial joint line tenderness. Range of Motion:   0-110  Strength: grossly intact  Stability: Knee stable varus valgus testing at 0 and 30 degrees, negative Lachman, negative posterior drawer  Gait:  Normal  Neuro: Sensation equal bilateral lower extremities   Vascular: 2+ posterior tibialis pulse       Radiographic:  No new imaging today  Assessment:  Юлия Barahona is a 64 y.o. female with:  1.  Right knee osteoarthritis    Impression:  No diagnosis found. Office Procedures:  No orders of the defined types were placed in this encounter. Plan:   Pertinent imaging was reviewed. The etiology, natural history, and treatment options for the disorder were discussed. The roles of activity modifications, medications, injections, physical therapy, and surgical interventions were all described to the patient and questions were answered. Patient would like to try a viscosupplementation today however she will need insurance approval.  We will send off for authorization for this today and in the meantime the patient did request a steroid injection as she would like relief as she is experiencing significant pain today. Right knee steroid injection was provided today, see procedure note. Patient can follow-up on an as-needed basis. Procedure note-right knee intra-articular steroid injection    The right inferolateral area of the knee was sterilely prepped with chlorhexidine. Next a syringe containing 2 cc of Depo-Medrol was injected intra-articularly to the knee. Needle was withdrawn and Band-Aid applied. Patient tolerated the procedure well. [id number] Fellow    The encounter with Fanny Dorsey was supervised by Dr Tu Herrera who personally examined the patient and reviewed the plan. This dictation was performed with a verbal recognition program (DRAGON) and it was checked for errors. It is possible that there are still dictated errors within this office note. If so, please bring any errors to my attention for an addendum. All efforts were made to ensure that this office note is accurate. This dictation was performed with a verbal recognition program (DRAGON) and it was checked for errors. It is possible that there are still dictated errors within this office note. If so, please bring any errors to my attention for an addendum.   All efforts were made to ensure that this office note is accurate. Supervising Physician Attestation:  I, Dr. Alexia Hilton, personally performed the services described in this documentation as scribed above, and it is both accurate and complete and I agree with all pertinent clinical information. I personally interviewed the patient and performed a physical examination and medical decision making. I discussed the patient's condition and treatment options and have  reviewed and agree with the past medical, family and social history unless otherwise noted. All of the patient's questions were answered.       Board Certified Orthopaedic Surgeon  [facility] and [address] and [address] and [employer]  Professor of [address]

## 2020-11-20 ENCOUNTER — TELEPHONE (OUTPATIENT)
Dept: ORTHOPEDIC SURGERY | Age: 60
End: 2020-11-20

## 2020-11-20 NOTE — TELEPHONE ENCOUNTER
11/20/2020  [facility]     RIGHT  KNEE. NO COVERAGE - CASH BASED PROGRAM. PER LEADERSHIP.  NOT A COVERED BENEFIT FOR THIS SELF FUNDED BCBS OF [place], PER NOTES FOR THIS GROUP.  AP

## 2020-11-23 ENCOUNTER — TELEPHONE (OUTPATIENT)
Dept: PULMONOLOGY | Age: 60
End: 2020-11-23

## 2020-11-23 RX ORDER — DEXTROMETHORPHAN HYDROBROMIDE AND PROMETHAZINE HYDROCHLORIDE 15; 6.25 MG/5ML; MG/5ML
5 SYRUP ORAL 4 TIMES DAILY PRN
Qty: 118 ML | Refills: 0 | Status: SHIPPED | OUTPATIENT
Start: 2020-11-23 | End: 2020-11-30

## 2020-11-23 NOTE — TELEPHONE ENCOUNTER
Instructed Iris to take the Prilosec until she comes back to see Dr Aissatou Beltrán, and that Prometazine DM was sent to pharmacy in place of Bromfed and made her aware the promethazine could make her drowsy.   She did say she is taking Singulair every night

## 2020-11-23 NOTE — TELEPHONE ENCOUNTER
To know whether Prilosec works or not she has to try it for at least 4 weeks so I would like her to continue taking prilosec until she comes back to see me. I will send another cough syrup called Promethazine DM, but please let her know it can make her drowsy. She can try this instead of Bromfed-DM. Is she taking Singulair? MICU team med team

## 2020-11-23 NOTE — TELEPHONE ENCOUNTER
Notified patient that insurance will not cover injection and she will get back with me regarding paying for it.

## 2020-11-30 NOTE — TELEPHONE ENCOUNTER
Since the cause of her cough is not clear at this point and we have tried several different medications without improvement she needs additional testing. I would like her to obtain a chest CT. This will give me a better view of her lungs than the chest x-ray that she had.

## 2020-11-30 NOTE — TELEPHONE ENCOUNTER
When patient called last week I indicated that she will need to take Prilosec for 4 weeks to be able to tell whether it is working or not for her cough. Did she call in because she feels the cough is getting worse or is it the same? Does she need a refill on the Promethazine DM cough syrup?

## 2020-11-30 NOTE — TELEPHONE ENCOUNTER
She feels like the cough is worse and that the promethazine is not working. She would like something else. She states she knows she needs to take the Prilosec every day for 4 weeks.

## 2020-12-01 NOTE — TELEPHONE ENCOUNTER
I called the patient and could not leave a message on her cell, b/c her VM is full. I left a message on her home phone to return my call.

## 2020-12-10 ENCOUNTER — TELEPHONE (OUTPATIENT)
Dept: PRIMARY CARE CLINIC | Age: 60
End: 2020-12-10

## 2020-12-10 NOTE — TELEPHONE ENCOUNTER
BP is usually high in ER     Advise low salt diet     If BP still up with Dr Dann Garcia he will prescribe med

## 2020-12-10 NOTE — TELEPHONE ENCOUNTER
Pt went to [facility] for her blood pressure. They told her to check with her doctor to see about changing medication. Pt has a physical scheuduled  with Dr. Danielle Company 12/30, but needs advice on blood pressure before then.

## 2020-12-15 ENCOUNTER — HOSPITAL ENCOUNTER (OUTPATIENT)
Dept: CT IMAGING | Age: 60
Discharge: HOME | End: 2020-12-15
Payer: COMMERCIAL

## 2020-12-15 PROCEDURE — 71250 CT THORAX DX C-: CPT

## 2020-12-17 ENCOUNTER — OFFICE VISIT (OUTPATIENT)
Dept: PULMONOLOGY | Age: 60
End: 2020-12-17
Payer: COMMERCIAL

## 2020-12-17 VITALS
SYSTOLIC BLOOD PRESSURE: 134 MMHG | TEMPERATURE: 97.7 F | WEIGHT: 254.6 LBS | RESPIRATION RATE: 12 BRPM | BODY MASS INDEX: 42.42 KG/M2 | HEIGHT: 65 IN | DIASTOLIC BLOOD PRESSURE: 80 MMHG | OXYGEN SATURATION: 96 % | HEART RATE: 74 BPM

## 2020-12-17 PROCEDURE — 90686 IIV4 VACC NO PRSV 0.5 ML IM: CPT | Performed by: INTERNAL MEDICINE

## 2020-12-17 PROCEDURE — 90471 IMMUNIZATION ADMIN: CPT | Performed by: INTERNAL MEDICINE

## 2020-12-17 PROCEDURE — 99214 OFFICE O/P EST MOD 30 MIN: CPT | Performed by: INTERNAL MEDICINE

## 2020-12-17 RX ORDER — BROMPHENIRAMINE MALEATE, PSEUDOEPHEDRINE HYDROCHLORIDE, AND DEXTROMETHORPHAN HYDROBROMIDE 2; 30; 10 MG/5ML; MG/5ML; MG/5ML
5 SYRUP ORAL 4 TIMES DAILY PRN
Qty: 473 ML | Refills: 1 | Status: SHIPPED | OUTPATIENT
Start: 2020-12-17 | End: 2020-12-31

## 2020-12-17 RX ORDER — MONTELUKAST SODIUM 10 MG/1
10 TABLET ORAL NIGHTLY
Qty: 90 TABLET | Refills: 3 | Status: SHIPPED | OUTPATIENT
Start: 2020-12-17 | End: 2021-08-02

## 2020-12-17 NOTE — PROGRESS NOTES
Chief complaint  This is a 64y.o. year old female  who presents with a chief complaint of   Chief Complaint   Patient presents with    Cough       HPI  53-year-old woman with cough, asthma and post-nasal drainage presents for follow up. She recently ran out of Singulair because she had been taking it twice a day in an effort to control her cough. She feels that her cough is better, but is still persistent. She is taking omeprazole daily. She has not used Miramontes Musty recently because she feels it makes her postnasal drainage worse. She has been using Advair only as needed. She uses albuterol inhaler intermittently. She tried [employer] and Promethazine DM, but had no improvement with them. She has Bromfed-DM as needed. Recent chest CT showed cardiomegaly with an enlarged left ventricle.     Past Medical History:   Diagnosis Date    GERD (gastroesophageal reflux disease)     Gestational diabetes     Hypertension     Morbid obesity due to excess calories ([facility] [address] 2016    Severe persistent asthma 2019    Vitamin D deficiency     Xerosis cutis        Past Surgical History:   Procedure Laterality Date     SECTION         Current Outpatient Medications   Medication Sig Dispense Refill    brompheniramine-pseudoephedrine-DM (BROMFED DM) 2-30-10 MG/5ML syrup Take 5 mLs by mouth 4 times daily as needed for Congestion or Cough 473 mL 1    fluticasone-salmeterol (ADVAIR) 500-50 MCG/DOSE diskus inhaler Inhale 1 puff into the lungs every 12 hours 1 Inhaler 3    montelukast (SINGULAIR) 10 MG tablet Take 1 tablet by mouth nightly 90 tablet 3    benzonatate (TESSALON) 100 MG capsule Take 1 capsule by mouth 3 times daily as needed for Cough 90 capsule 0    omeprazole (PRILOSEC) 20 MG delayed release capsule Take 1 capsule by mouth Daily 30 capsule 1  naproxen (NAPROSYN) 500 MG tablet Take 1 tablet by mouth 2 times daily (with meals) (Patient taking differently: Take 500 mg by mouth 2 times daily (with meals) As needed) 28 tablet 0    Gabapentin 10 % CREA Apply 1 cm2 topically 2 times daily as needed (if burning or electric sensation persists) Apply thin layer of cream over affected area. Wash hands after application. Stop use if side-effects occur and call your doctor. 2 Tube 1    Azelastine-Fluticasone (DYMISTA) 137-50 MCG/ACT SUSP 1 spray by Nasal route 2 times daily 1 Bottle 2    tiZANidine (ZANAFLEX) 2 MG tablet Take 1 tablet by mouth every 8 hours as needed (spasms) (Patient taking differently: Take 2 mg by mouth every 8 hours as needed (spasms) As needed) 30 tablet 0    candesartan (ATACAND) 32 MG tablet Take 1 tablet by mouth daily 90 tablet 5    albuterol sulfate  (90 Base) MCG/ACT inhaler Inhale 2 puffs into the lungs every 4 hours as needed for Wheezing 1 Inhaler 6     No current facility-administered medications for this visit.         Allergies   Allergen Reactions    Chloroquine     Lisinopril      Coughing and choking    Mefloquine     Quinolones        FAMILY HISTORY  Maternal aunts- HTN    Social History     Socioeconomic History    Marital status:      Spouse name: Not on file    Number of children: Not on file    Years of education: Not on file    Highest education level: Not on file   Occupational History    Not on file   Social Needs    Financial resource strain: Not on file    Food insecurity     Worry: Not on file     Inability: Not on file    Transportation needs     Medical: Not on file     Non-medical: Not on file   Tobacco Use    Smoking status: Never Smoker    Smokeless tobacco: Never Used   Substance and Sexual Activity    Alcohol use: No     Alcohol/week: 0.0 standard drinks    Drug use: No    Sexual activity: Yes     Partners: Male   Lifestyle    Physical activity Days per week: Not on file     Minutes per session: Not on file    Stress: Not on file   Relationships    Social connections     Talks on phone: Not on file     Gets together: Not on file     Attends Muslim service: Not on file     Active member of club or organization: Not on file     Attends meetings of clubs or organizations: Not on file     Relationship status: Not on file    Intimate partner violence     Fear of current or ex partner: Not on file     Emotionally abused: Not on file     Physically abused: Not on file     Forced sexual activity: Not on file   Other Topics Concern    Not on file   Social History Narrative    Not on file   Never smoked, but her mother smoked    Immunization History   Administered Date(s) Administered    Influenza Vaccine, unspecified formulation 12/02/2016    Influenza, Intradermal, Quadrivalent, Preservative Free 12/18/2017    Influenza, Quadv, IM, (6 mo and older Fluzone, Flulaval, Fluarix and 3 yrs and older Afluria) 10/25/2018    Influenza, Quadv, IM, PF (6 mo and older Fluzone, Flulaval, Fluarix, and 3 yrs and older Afluria) 12/30/2019, 12/17/2020    Pneumococcal Polysaccharide ([id number]) 12/30/2019    Tdap (Boostrix, Adacel) 03/28/2013, 11/27/2015       ROS:  GENERAL:  No fevers, no chills, no weight loss or gain   RESPIRATORY:  No shortness of breath, + cough    PHYSICAL EXAM:  Vitals:    12/17/20 1514   BP: 134/80   Site: Left Lower Arm   Position: Sitting   Cuff Size: Medium Adult   Pulse: 74   Resp: 12   Temp: 97.7 °F (36.5 °C)   TempSrc: Temporal   SpO2: 96%   Weight: 254 lb 9.6 oz (115.5 kg)   Height: 5' 5\" (1.651 m)   on RA    Gen: Well developed; well nourished  Eyes: No scleral icterus. No conjunctival injection. ENT:  Oropharynx clear. External appearance of ears and nose normal.  Neck: Trachea midline. No obvious mass.  No visible thyroid enlargement    Respiratory: Clear to auscultation bilaterally, no accessory muscle use Cardiovascular: Regular rate and rhythm, no appreciable murmurs. No edema. Gastrointestinal: Soft, non-tender. No hernia  Skin: Warm and dry. No rashes or ulcers on visible areas. Normal texture and turgor  Lymphatic: No cervical LAD. No supraclavicular LAD. Musculoskeletal: No cyanosis, clubbing or joint deformity. Psychiatric: Normal mood and affect; exhibits normal insight and judgement     Data reviewed:  Pulmonary Function Testing (9/16/19)  FVC 1.98 L at 76% predicted ---> 1.9L at 74% predicted  FEV1 1.74 L at 85% predicted ----> 1.72L at 84% predicted  FEV1/FVC ratio at 88% ---->91% predicted  TLC 4.27 L at 88% predicted  DLCO 25.12 at 104% predicted    Methacholine challenge: Positive at 16mg/ml    Overall: Normal flow measurements without significant reversal; normal diffusing capacity; methacholine challenge shows borderline bronchial hyperreactivity    Images and reports of chest imaging were reviewed by me. My interpretation is:  CXR (11/10/20): Clear lung fields  Chest CT (12/15/20): No LAD; patchy areas of atelectasis bilaterally; focal areas of air trapping bilaterally; small area of lucency posterior to the trachea      Lab Results   Component Value Date    WBC 4.9 12/30/2019    HGB 13.6 12/30/2019    HCT 40.8 12/30/2019    MCV 85.8 12/30/2019     12/30/2019       No results found for: BNP    Lab Results   Component Value Date    CREATININE 0.8 12/30/2019    BUN 22 (H) 12/30/2019     12/30/2019    K 4.4 12/30/2019     12/30/2019    CO2 25 12/30/2019   Allergy panel: Negative  IgE- 32      Assessment/Plan:64y.o. year old female presents for follow up. Cough- Likely multifactorial: asthma, upper airway cough syndrome and GERD. She also has an enlarged left ventricle on chest CT. Will obtain echocardiogram for further evaluation. She will complete 8 weeks of omeprazole. We discussed that after that she should stop omeprazole and observe. If her cough worsens she should let me know. Continue Bromfed-DM as needed. Cardiomegaly- Will obtain echocardiogram for further evaluation. Asthma- I have asked her to use Advair twice daily and albuterol inhaler as needed. We discussed that she should use Singulair only once a day, but that she may use another antihistamine such as Claritin or Allegra if needed. Post-nasal drainage- Will discontinue Dymista. Need for influenza vaccine- She received the flu vaccine today. She will return for follow-up in 2 months.        Kaitlin Weber MD  [facility] Pulmonology, Critical Care and Sleep

## 2020-12-21 RX ORDER — BENZONATATE 100 MG/1
100 CAPSULE ORAL 3 TIMES DAILY PRN
Qty: 90 CAPSULE | Refills: 1 | Status: SHIPPED | OUTPATIENT
Start: 2020-12-21 | End: 2020-12-31

## 2020-12-21 NOTE — TELEPHONE ENCOUNTER
Юлия called requesting a refill on her Tessalon capsules which she said she is taking tid.   Please send refill to [facility] 12/17/20  NOV 2/11/21

## 2020-12-31 ENCOUNTER — OFFICE VISIT (OUTPATIENT)
Dept: PRIMARY CARE CLINIC | Age: 60
End: 2020-12-31
Payer: COMMERCIAL

## 2020-12-31 VITALS
TEMPERATURE: 97.1 F | DIASTOLIC BLOOD PRESSURE: 90 MMHG | HEART RATE: 76 BPM | OXYGEN SATURATION: 98 % | BODY MASS INDEX: 42.27 KG/M2 | SYSTOLIC BLOOD PRESSURE: 140 MMHG | WEIGHT: 254 LBS

## 2020-12-31 DIAGNOSIS — Z12.11 COLON CANCER SCREENING: ICD-10-CM

## 2020-12-31 DIAGNOSIS — I10 ESSENTIAL HYPERTENSION: ICD-10-CM

## 2020-12-31 DIAGNOSIS — E78.2 MIXED HYPERLIPIDEMIA: ICD-10-CM

## 2020-12-31 DIAGNOSIS — J45.30 MILD PERSISTENT ASTHMA WITHOUT COMPLICATION: ICD-10-CM

## 2020-12-31 DIAGNOSIS — Z00.00 ENCOUNTER FOR PREVENTATIVE ADULT HEALTH CARE EXAMINATION: Primary | ICD-10-CM

## 2020-12-31 DIAGNOSIS — E55.9 VITAMIN D DEFICIENCY: ICD-10-CM

## 2020-12-31 DIAGNOSIS — R25.2 MUSCLE CRAMPS: ICD-10-CM

## 2020-12-31 DIAGNOSIS — G72.9 MYOPATHY: ICD-10-CM

## 2020-12-31 DIAGNOSIS — S33.5XXA SPRAIN OF LOW BACK, INITIAL ENCOUNTER: ICD-10-CM

## 2020-12-31 PROBLEM — H16.149 SUPERFICIAL PUNCTATE KERATITIS: Status: ACTIVE | Noted: 2020-09-16

## 2020-12-31 LAB
A/G RATIO: 1.5 (ref 1.1–2.2)
ALBUMIN SERPL-MCNC: 4.3 G/DL (ref 3.4–5)
ALP BLD-CCNC: 112 U/L (ref 40–129)
ALT SERPL-CCNC: 16 U/L (ref 10–40)
ANION GAP SERPL CALCULATED.3IONS-SCNC: 12 MMOL/L (ref 3–16)
AST SERPL-CCNC: 20 U/L (ref 15–37)
BASOPHILS ABSOLUTE: 0.1 K/UL (ref 0–0.2)
BASOPHILS RELATIVE PERCENT: 1.6 %
BILIRUB SERPL-MCNC: 0.3 MG/DL (ref 0–1)
BILIRUBIN URINE: NEGATIVE
BLOOD, URINE: NEGATIVE
BUN BLDV-MCNC: 17 MG/DL (ref 7–20)
CALCIUM SERPL-MCNC: 9.8 MG/DL (ref 8.3–10.6)
CHLORIDE BLD-SCNC: 104 MMOL/L (ref 99–110)
CHOLESTEROL, TOTAL: 209 MG/DL (ref 0–199)
CLARITY: CLEAR
CO2: 27 MMOL/L (ref 21–32)
COLOR: YELLOW
CREAT SERPL-MCNC: 0.7 MG/DL (ref 0.6–1.1)
EOSINOPHILS ABSOLUTE: 0.1 K/UL (ref 0–0.6)
EOSINOPHILS RELATIVE PERCENT: 2.8 %
GFR AFRICAN AMERICAN: >60
GFR NON-AFRICAN AMERICAN: >60
GLOBULIN: 2.9 G/DL
GLUCOSE BLD-MCNC: 89 MG/DL (ref 70–99)
GLUCOSE URINE: NEGATIVE MG/DL
HCT VFR BLD CALC: 39.9 % (ref 36–48)
HDLC SERPL-MCNC: 64 MG/DL (ref 40–60)
HEMOGLOBIN: 13.3 G/DL (ref 12–16)
KETONES, URINE: NEGATIVE MG/DL
LDL CHOLESTEROL CALCULATED: 132 MG/DL
LEUKOCYTE ESTERASE, URINE: NEGATIVE
LYMPHOCYTES ABSOLUTE: 1.4 K/UL (ref 1–5.1)
LYMPHOCYTES RELATIVE PERCENT: 27.5 %
MCH RBC QN AUTO: 28.5 PG (ref 26–34)
MCHC RBC AUTO-ENTMCNC: 33.2 G/DL (ref 31–36)
MCV RBC AUTO: 85.7 FL (ref 80–100)
MICROSCOPIC EXAMINATION: NORMAL
MONOCYTES ABSOLUTE: 0.4 K/UL (ref 0–1.3)
MONOCYTES RELATIVE PERCENT: 8.7 %
NEUTROPHILS ABSOLUTE: 3 K/UL (ref 1.7–7.7)
NEUTROPHILS RELATIVE PERCENT: 59.4 %
NITRITE, URINE: NEGATIVE
PDW BLD-RTO: 13.9 % (ref 12.4–15.4)
PH UA: 6.5 (ref 5–8)
PLATELET # BLD: 243 K/UL (ref 135–450)
PMV BLD AUTO: 9.5 FL (ref 5–10.5)
POTASSIUM SERPL-SCNC: 4.3 MMOL/L (ref 3.5–5.1)
PROTEIN UA: NEGATIVE MG/DL
RBC # BLD: 4.66 M/UL (ref 4–5.2)
SODIUM BLD-SCNC: 143 MMOL/L (ref 136–145)
SPECIFIC GRAVITY UA: 1.02 (ref 1–1.03)
TOTAL CK: 491 U/L (ref 26–192)
TOTAL PROTEIN: 7.2 G/DL (ref 6.4–8.2)
TRIGL SERPL-MCNC: 66 MG/DL (ref 0–150)
TSH REFLEX FT4: 1.55 UIU/ML (ref 0.27–4.2)
URINE TYPE: NORMAL
UROBILINOGEN, URINE: 0.2 E.U./DL
VITAMIN D 25-HYDROXY: 28.4 NG/ML
VLDLC SERPL CALC-MCNC: 13 MG/DL
WBC # BLD: 5.1 K/UL (ref 4–11)

## 2020-12-31 PROCEDURE — 99396 PREV VISIT EST AGE 40-64: CPT | Performed by: INTERNAL MEDICINE

## 2020-12-31 RX ORDER — ALBUTEROL SULFATE 90 UG/1
2 AEROSOL, METERED RESPIRATORY (INHALATION) EVERY 4 HOURS PRN
Qty: 1 INHALER | Refills: 6 | Status: SHIPPED | OUTPATIENT
Start: 2020-12-31 | End: 2021-02-09 | Stop reason: SDUPTHER

## 2020-12-31 RX ORDER — ASPIRIN 81 MG/1
81 TABLET ORAL DAILY
Qty: 90 TABLET | Refills: 1 | Status: SHIPPED | OUTPATIENT
Start: 2020-12-31 | End: 2021-08-02

## 2020-12-31 RX ORDER — CANDESARTAN CILEXETIL AND HYDROCHLOROTHIAZIDE 32; 25 MG/1; MG/1
1 TABLET ORAL DAILY
Qty: 90 TABLET | Refills: 1 | Status: SHIPPED | OUTPATIENT
Start: 2020-12-31 | End: 2021-05-18

## 2020-12-31 RX ORDER — CAL/D3/MAG11/ZINC/COP/MANG/BOR 600 MG-800
1 TABLET ORAL 2 TIMES DAILY
Qty: 60 TABLET | Refills: 11 | Status: SHIPPED | OUTPATIENT
Start: 2020-12-31 | End: 2021-12-17 | Stop reason: SDUPTHER

## 2020-12-31 RX ORDER — TIZANIDINE 2 MG/1
2 TABLET ORAL EVERY 8 HOURS PRN
Qty: 30 TABLET | Refills: 3 | Status: SHIPPED | OUTPATIENT
Start: 2020-12-31 | End: 2021-03-31

## 2020-12-31 RX ORDER — ATORVASTATIN CALCIUM 20 MG/1
20 TABLET, FILM COATED ORAL DAILY
Qty: 30 TABLET | Refills: 5 | Status: SHIPPED | OUTPATIENT
Start: 2020-12-31 | End: 2021-01-01 | Stop reason: SINTOL

## 2020-12-31 RX ORDER — MELATONIN
2000 DAILY
Qty: 60 TABLET | Refills: 11 | Status: SHIPPED | OUTPATIENT
Start: 2020-12-31

## 2020-12-31 ASSESSMENT — PATIENT HEALTH QUESTIONNAIRE - PHQ9
SUM OF ALL RESPONSES TO PHQ QUESTIONS 1-9: 0
SUM OF ALL RESPONSES TO PHQ QUESTIONS 1-9: 0
2. FEELING DOWN, DEPRESSED OR HOPELESS: 0

## 2020-12-31 NOTE — PROGRESS NOTES
Musculoskeletal: Positive for arthralgias. H/O Torn Meniscus rt    Allergic/Immunologic: Positive for environmental allergies. Negative for food allergies. Neurological: Negative for dizziness, weakness and headaches. Psychiatric/Behavioral: Negative for behavioral problems, dysphoric mood and sleep disturbance. The patient is not nervous/anxious. Prior to Visit Medications    Medication Sig Taking? Authorizing Provider   candesartan cilexetil-HCTZ (ATACAND HCT) 32-25 MG TABS Take 1 tablet by mouth daily Yes Kathy Eckert MD   tiZANidine (ZANAFLEX) 2 MG tablet Take 1 tablet by mouth every 8 hours as needed (spasms) As needed Yes Kathy Eckert MD   Caltrate 600+D Plus Minerals (CALTRATE) 600-800 MG-UNIT TABS tablet Take 1 tablet by mouth 2 times daily Okay to give generic equivalent and if not covered direct patient to buy OTC.  Yes Kathy Eckert MD   albuterol sulfate  (90 Base) MCG/ACT inhaler Inhale 2 puffs into the lungs every 4 hours as needed for Wheezing Yes Kathy Eckert MD   atorvastatin (LIPITOR) 20 MG tablet Take 1 tablet by mouth daily Yes Kathy Eckert MD   aspirin EC 81 MG EC tablet Take 1 tablet by mouth daily Yes Kathy Eckert MD   vitamin D3 (CHOLECALCIFEROL) 25 MCG (1000 UT) TABS tablet Take 2 tablets by mouth daily Yes Kathy Eckert MD   fluticasone-salmeterol (ADVAIR) 500-50 MCG/DOSE diskus inhaler Inhale 1 puff into the lungs every 12 hours Yes Delano Javier MD   montelukast (SINGULAIR) 10 MG tablet Take 1 tablet by mouth nightly Yes Delano Javier MD   omeprazole (PRILOSEC) 20 MG delayed release capsule Take 1 capsule by mouth Daily Yes Delano Javier MD Attends meetings of clubs or organizations: Not on file     Relationship status: Not on file    Intimate partner violence     Fear of current or ex partner: Not on file     Emotionally abused: Not on file     Physically abused: Not on file     Forced sexual activity: Not on file   Other Topics Concern    Not on file   Social History Narrative    Not on file        History reviewed. No pertinent family history. ADVANCE DIRECTIVE: N, <no information>    Vitals:    12/31/20 0914 12/31/20 0918 12/31/20 0955   BP: (!) 160/101 (!) 158/90 (!) 140/90   Site:   Left Upper Arm   Position:   Sitting   Cuff Size:   Large Adult   Pulse: 76     Temp: 97.1 °F (36.2 °C)     TempSrc: Oral     SpO2: 98%     Weight: 254 lb (115.2 kg)       Estimated body mass index is 42.27 kg/m² as calculated from the following:    Height as of 12/17/20: 5' 5\" (1.651 m). Weight as of this encounter: 254 lb (115.2 kg). Physical Exam  Vitals signs reviewed. Constitutional:       Appearance: She is obese. HENT:      Mouth/Throat:      Mouth: Mucous membranes are moist.      Pharynx: Oropharynx is clear. Eyes:      Extraocular Movements: Extraocular movements intact. Conjunctiva/sclera: Conjunctivae normal.      Pupils: Pupils are equal, round, and reactive to light. Neck:      Musculoskeletal: Normal range of motion and neck supple. Cardiovascular:      Rate and Rhythm: Normal rate and regular rhythm. Heart sounds: Normal heart sounds. Pulmonary:      Breath sounds: Normal breath sounds. Abdominal:      General: There is distension. Palpations: Abdomen is soft. Musculoskeletal: Normal range of motion. General: No tenderness. Skin:     General: Skin is warm and dry. Neurological:      Mental Status: She is alert and oriented to person, place, and time.    Psychiatric:         Mood and Affect: Mood normal.         Behavior: Behavior normal.         Judgment: Judgment normal. No flowsheet data found.     Lab Results   Component Value Date    CHOL 209 12/31/2020    CHOL 201 12/30/2019    CHOL 180 12/21/2017    TRIG 66 12/31/2020    TRIG 72 12/30/2019    TRIG 72 12/21/2017    HDL 64 12/31/2020    HDL 64 12/30/2019    HDL 56 12/21/2017    HDL 55 12/29/2011    HDL 51 03/29/2010    LDLCALC 132 12/31/2020    LDLCALC 123 12/30/2019    LDLCALC 110 12/21/2017    GLUCOSE 89 12/31/2020    LABA1C 5.5 12/30/2019    LABA1C 6.0 06/07/2017       The 10-year ASCVD risk score (Oliva Pelayo, et al., 2013) is: 7.4%    Values used to calculate the score:      Age: 64 years      Sex: Female      Is Non- : Yes      Diabetic: No      Tobacco smoker: No      Systolic Blood Pressure: 065 mmHg      Is BP treated: Yes      HDL Cholesterol: 64 mg/dL      Total Cholesterol: 209 mg/dL    Immunization History   Administered Date(s) Administered    Influenza A ([id number]) Vaccine Nasal 01/22/2010    Influenza Vaccine, unspecified formulation 12/02/2016    Influenza Virus Vaccine 12/30/2013, 12/02/2016, 10/25/2018    Influenza, Intradermal, Quadrivalent, Preservative Free 12/18/2017    Influenza, Quadv, IM, (6 mo and older Fluzone, Flulaval, Fluarix and 3 yrs and older Afluria) 10/25/2018    Influenza, Quadv, IM, PF (6 mo and older Fluzone, Flulaval, Fluarix, and 3 yrs and older Afluria) 12/30/2019, 12/17/2020    Pneumococcal Polysaccharide ([id number]) 12/30/2019    Tdap (Boostrix, Adacel) 03/28/2013, 11/27/2015       Health Maintenance   Topic Date Due    Shingles Vaccine (1 of 2) 08/01/2012    Cervical cancer screen  12/06/2021    Lipid screen  12/31/2021    Potassium monitoring  12/31/2021    Creatinine monitoring  12/31/2021    Breast cancer screen  02/05/2022    DTaP/Tdap/Td vaccine (3 - Td) 11/27/2025    Colon cancer screen colonoscopy  12/27/2028    Flu vaccine  Completed    Pneumococcal 0-64 years Vaccine  Completed    Hepatitis C screen  Completed  Hepatitis A vaccine  Aged Out    Hepatitis B vaccine  Aged Out    Hib vaccine  Aged Out    Meningococcal (ACWY) vaccine  Aged Out    HIV screen  Discontinued       ASSESSMENT/PLAN:       Diagnosis Orders   1. Encounter for preventative adult health care examination: normal mammogram to/5/20 Please contact the office noted below to schedule an appointment. [address] [facility], [facility] [address], [facility] [address], order lipid CMP CBC urinalysis. 2. Essential hypertension taking Candesartan 32 units daily, will add HCTZ 25 mg daily. BP Readings from Last 3 Encounters:   12/31/20 (!) 140/90   12/17/20 134/80   11/10/20 (!) 150/80    candesartan cilexetil-HCTZ (ATACAND HCT) 32-25 MG TABS    Comprehensive Metabolic Panel    CBC Auto Differential    Urinalysis   3. Sprain of low back, initial encounter pain management PRN use of tizanidine, patient request refill. tiZANidine (ZANAFLEX) 2 MG tablet   4. Muscle cramps at night. Check CMP CBC and start calcium supplement. Caltrate 600+D Plus Minerals (CALTRATE) 600-800 MG-UNIT TABS tablet   5. Mild persistent asthma without complication, stable with PRN albuterol. albuterol sulfate  (90 Base) MCG/ACT inhaler   6.  Mixed hyperlipidemia   Lab Results   Component Value Date    CHOL 209 (H) 12/31/2020    CHOL 201 (H) 12/30/2019    CHOL 180 12/21/2017     Lab Results   Component Value Date    TRIG 66 12/31/2020    TRIG 72 12/30/2019    TRIG 72 12/21/2017     Lab Results   Component Value Date    HDL 64 (H) 12/31/2020    HDL 64 (H) 12/30/2019    HDL 56 12/21/2017     Lab Results   Component Value Date    LDLCALC 132 (H) 12/31/2020    LDLCALC 123 (H) 12/30/2019    LDLCALC 110 (H) 12/21/2017     Lab Results   Component Value Date    LABVLDL 13 12/31/2020    LABVLDL 14 12/30/2019    LABVLDL 14 12/21/2017     No results found for: CHOLHDLRATIO  The 10-year ASCVD risk score (Dorinda Leonard, et al., 2013) is: 7.4% Values used to calculate the score:      Age: 64 years      Sex: Female      Is Non- : Yes      Diabetic: No      Tobacco smoker: No      Systolic Blood Pressure: 765 mmHg      Is BP treated: Yes      HDL Cholesterol: 64 mg/dL      Total Cholesterol: 209 mg/dL     atorvastatin (LIPITOR) 20 MG tablet   order statin. Check baseline CPK. aspirin EC 81 MG EC tablet    TSH WITH REFLEX TO FT4    CK    Lipid Panel   7. Vitamin D deficiency in the past. She has been out of vitamin D supplement, will reorder. Vitamin D 25 Hydroxy    vitamin D3 (CHOLECALCIFEROL) 25 MCG (1000 UT) TABS tablet       An electronic signature was used to authenticate this note.     --Rian Renner MD on 1/1/2021 at 9:47 PM

## 2021-01-01 DIAGNOSIS — G72.9 MYOPATHY: ICD-10-CM

## 2021-01-01 RX ORDER — BEMPEDOIC ACID 180 MG/1
180 TABLET, FILM COATED ORAL DAILY
Qty: 30 TABLET | Refills: 5 | Status: SHIPPED | OUTPATIENT
Start: 2021-01-01 | End: 2021-08-02

## 2021-01-01 ASSESSMENT — PATIENT HEALTH QUESTIONNAIRE - PHQ9: SUM OF ALL RESPONSES TO PHQ QUESTIONS 1-9: 0

## 2021-01-05 ENCOUNTER — HOSPITAL ENCOUNTER (EMERGENCY)
Age: 61
Discharge: HOME | End: 2021-01-05
Attending: EMERGENCY MEDICINE
Payer: COMMERCIAL

## 2021-01-05 ENCOUNTER — APPOINTMENT (OUTPATIENT)
Dept: GENERAL RADIOLOGY | Age: 61
End: 2021-01-05
Payer: COMMERCIAL

## 2021-01-05 VITALS
BODY MASS INDEX: 43.65 KG/M2 | DIASTOLIC BLOOD PRESSURE: 87 MMHG | RESPIRATION RATE: 20 BRPM | TEMPERATURE: 98.8 F | SYSTOLIC BLOOD PRESSURE: 145 MMHG | WEIGHT: 262 LBS | HEIGHT: 65 IN | OXYGEN SATURATION: 95 % | HEART RATE: 73 BPM

## 2021-01-05 DIAGNOSIS — M79.601 RIGHT ARM PAIN: Primary | ICD-10-CM

## 2021-01-05 PROCEDURE — 6360000002 HC RX W HCPCS: Performed by: STUDENT IN AN ORGANIZED HEALTH CARE EDUCATION/TRAINING PROGRAM

## 2021-01-05 PROCEDURE — 73080 X-RAY EXAM OF ELBOW: CPT

## 2021-01-05 PROCEDURE — 99283 EMERGENCY DEPT VISIT LOW MDM: CPT

## 2021-01-05 PROCEDURE — 96372 THER/PROPH/DIAG INJ SC/IM: CPT

## 2021-01-05 RX ORDER — KETOROLAC TROMETHAMINE 30 MG/ML
15 INJECTION, SOLUTION INTRAMUSCULAR; INTRAVENOUS ONCE
Status: COMPLETED | OUTPATIENT
Start: 2021-01-05 | End: 2021-01-05

## 2021-01-05 RX ADMIN — KETOROLAC TROMETHAMINE 15 MG: 30 INJECTION, SOLUTION INTRAMUSCULAR at 16:56

## 2021-01-05 ASSESSMENT — PAIN DESCRIPTION - PAIN TYPE
TYPE: ACUTE PAIN
TYPE: ACUTE PAIN

## 2021-01-05 ASSESSMENT — PAIN DESCRIPTION - LOCATION
LOCATION: ARM;ELBOW
LOCATION: ELBOW

## 2021-01-05 ASSESSMENT — PAIN DESCRIPTION - ORIENTATION
ORIENTATION: RIGHT
ORIENTATION: RIGHT

## 2021-01-05 ASSESSMENT — ENCOUNTER SYMPTOMS: COLOR CHANGE: 0

## 2021-01-05 ASSESSMENT — PAIN DESCRIPTION - DESCRIPTORS: DESCRIPTORS: CONSTANT

## 2021-01-05 ASSESSMENT — PAIN DESCRIPTION - FREQUENCY: FREQUENCY: CONTINUOUS

## 2021-01-05 NOTE — ED PROVIDER NOTES
[facility]  EMERGENCY DEPARTMENT ENCOUNTER          [facility] RESIDENT NOTE       Date of evaluation: 2021    Chief Complaint     Arm Pain (Right arm pain since hitting it on door knob )      History of Present Illness     Юлия Garza is a 64 y.o. female who presents after hitting her right elbow on a door knob 2 days ago. She has been treating with ice with some relief. She had some weakness with extension when trying to place an object into her cabinet. No loss of sensation or decreased ROM. Review of Systems     Review of Systems   Musculoskeletal: Positive for arthralgias (right elbow). Skin: Negative for color change, rash and wound. Past Medical, Surgical, Family, and Social History     She has a past medical history of GERD (gastroesophageal reflux disease), Gestational diabetes, Hypertension, Morbid obesity due to excess calories ([address] Severe persistent asthma, Vitamin D deficiency, and Xerosis cutis. She has a past surgical history that includes  section (). Her family history is not on file. She reports that she has never smoked. She has never used smokeless tobacco. She reports that she does not drink alcohol or use drugs. Medications     Previous Medications    ALBUTEROL SULFATE  (90 BASE) MCG/ACT INHALER    Inhale 2 puffs into the lungs every 4 hours as needed for Wheezing    ASPIRIN EC 81 MG EC TABLET    Take 1 tablet by mouth daily    AZELASTINE-FLUTICASONE (DYMISTA) 137-50 MCG/ACT SUSP    1 spray by Nasal route 2 times daily    BEMPEDOIC ACID (NEXLETOL) 180 MG TABS    Take 180 mg by mouth daily Stop atorvastatin due to elevated CPK    CALTRATE 600+D PLUS MINERALS (CALTRATE) 600-800 MG-UNIT TABS TABLET    Take 1 tablet by mouth 2 times daily Okay to give generic equivalent and if not covered direct patient to buy OTC.     CANDESARTAN CILEXETIL-HCTZ (ATACAND HCT) 32-25 MG TABS    Take 1 tablet by mouth daily FLUTICASONE-SALMETEROL (ADVAIR) 500-50 MCG/DOSE DISKUS INHALER    Inhale 1 puff into the lungs every 12 hours    GABAPENTIN 10 % CREA    Apply 1 cm2 topically 2 times daily as needed (if burning or electric sensation persists) Apply thin layer of cream over affected area. Wash hands after application. Stop use if side-effects occur and call your doctor. MONTELUKAST (SINGULAIR) 10 MG TABLET    Take 1 tablet by mouth nightly    OMEPRAZOLE (PRILOSEC) 20 MG DELAYED RELEASE CAPSULE    Take 1 capsule by mouth Daily    TIZANIDINE (ZANAFLEX) 2 MG TABLET    Take 1 tablet by mouth every 8 hours as needed (spasms) As needed    VITAMIN D3 (CHOLECALCIFEROL) 25 MCG (1000 UT) TABS TABLET    Take 2 tablets by mouth daily       Allergies     She is allergic to chloroquine; lisinopril; mefloquine; and quinolones. Physical Exam     INITIAL VITALS: BP: (!) 156/122, Temp: 98.8 °F (37.1 °C), Pulse: 88, Resp: 18, SpO2: 95 %    Physical Exam  Musculoskeletal: Normal range of motion. General: Tenderness (right elbow) present. No swelling, deformity or signs of injury. Skin:     General: Skin is warm and dry. Findings: No bruising. Diagnostic Results       RADIOLOGY:  XR ELBOW RIGHT (MIN 3 VIEWS)    (Results Pending)       LABS:   No results found for this visit on 01/05/21. RECENT VITALS:  BP: (!) 156/122, Temp: 98.8 °F (37.1 °C),Pulse: 88, Resp: 18, SpO2: 95 %     Procedures     n/a    ED Course     Nursing Notes, Past Medical Hx, Past Surgical Hx, Social Hx, Allergies, and FamilyHx were reviewed.     The patient was giventhe following medications:  Orders Placed This Encounter   Medications    ketorolac (TORADOL) injection 15 mg       CONSULTS:  None    MEDICAL DECISION Nicholas Parnell / Joseph Cullen / Savannah Jose Anahi Coates is a 64 y.o. female presents after bumping her elbow on a door knob 2 days ago. She complains of some swelling in the elbow with tenderness. Exam shows full ROM and no loss of sensation. Will give dose of Ketoralac and check xray of elbow. Xray normal and patient medically stable for discharge with pain control with tylenol and exercises given. This patient was also evaluated by the attending physician. All care plans were discussed and agreed upon. Clinical Impression     1. Right arm pain        Disposition     PATIENT REFERRED TO:  No follow-up provider specified.     DISCHARGE MEDICATIONS:  New Prescriptions    No medications on file       DISPOSITION Discharge - Pending Orders Complete 01/05/2021 05:13:07 PM       Venus Monreal MD  Resident  01/05/21 0630

## 2021-02-01 ENCOUNTER — TELEPHONE (OUTPATIENT)
Dept: PULMONOLOGY | Age: 61
End: 2021-02-01

## 2021-02-01 RX ORDER — BROMPHENIRAMINE MALEATE, PSEUDOEPHEDRINE HYDROCHLORIDE, AND DEXTROMETHORPHAN HYDROBROMIDE 2; 30; 10 MG/5ML; MG/5ML; MG/5ML
5 SYRUP ORAL 4 TIMES DAILY PRN
Qty: 473 ML | Refills: 1 | Status: SHIPPED | OUTPATIENT
Start: 2021-02-01 | End: 2021-02-09 | Stop reason: SDUPTHER

## 2021-02-01 NOTE — TELEPHONE ENCOUNTER
Patient is wanting to know if she can get some cough medication. Her cough is back. She has a dry cough and no fever.      LOV: 12/17/20  NOV: 2/9/21

## 2021-02-09 ENCOUNTER — VIRTUAL VISIT (OUTPATIENT)
Dept: PULMONOLOGY | Age: 61
End: 2021-02-09
Payer: COMMERCIAL

## 2021-02-09 DIAGNOSIS — R09.82 POST-NASAL DRIP: ICD-10-CM

## 2021-02-09 DIAGNOSIS — R05.9 COUGH: Primary | ICD-10-CM

## 2021-02-09 DIAGNOSIS — J45.20 MILD INTERMITTENT ASTHMA WITHOUT COMPLICATION: ICD-10-CM

## 2021-02-09 PROCEDURE — 99214 OFFICE O/P EST MOD 30 MIN: CPT | Performed by: INTERNAL MEDICINE

## 2021-02-09 RX ORDER — AZELASTINE 1 MG/ML
1 SPRAY, METERED NASAL 2 TIMES DAILY
Qty: 1 BOTTLE | Refills: 3 | Status: SHIPPED | OUTPATIENT
Start: 2021-02-09 | End: 2021-08-02

## 2021-02-09 RX ORDER — ALBUTEROL SULFATE 90 UG/1
2 AEROSOL, METERED RESPIRATORY (INHALATION) EVERY 4 HOURS PRN
Qty: 1 INHALER | Refills: 5 | Status: SHIPPED | OUTPATIENT
Start: 2021-02-09 | End: 2021-08-02

## 2021-02-09 RX ORDER — BENZONATATE 200 MG/1
200 CAPSULE ORAL 3 TIMES DAILY PRN
Qty: 90 CAPSULE | Refills: 1 | Status: SHIPPED | OUTPATIENT
Start: 2021-02-09 | End: 2021-03-09 | Stop reason: SDUPTHER

## 2021-02-09 RX ORDER — BROMPHENIRAMINE MALEATE, PSEUDOEPHEDRINE HYDROCHLORIDE, AND DEXTROMETHORPHAN HYDROBROMIDE 2; 30; 10 MG/5ML; MG/5ML; MG/5ML
5 SYRUP ORAL 4 TIMES DAILY PRN
Qty: 473 ML | Refills: 1 | Status: SHIPPED | OUTPATIENT
Start: 2021-02-09 | End: 2021-08-02

## 2021-02-09 NOTE — PROGRESS NOTES
Chief complaint  This is a 64y.o. year old female  who presents with a chief complaint of   Chief Complaint   Patient presents with    Cough     This was a telehealth visit performed during the coronavirus pandemic using video and audio. Patient provided consent for the visit. Participants were Junior Green MD in the office and patient at home. HPI  62-year-old woman with cough, asthma and post-nasal drainage presents for follow up. She continues to be troubled by cough. She is using Advair, but feels it does not help. She feels albuterol helps to \"soothe\" her after a coughing spell. She has mild relief with Bromfed-DM and Tessalon Perles. She discontinued Dymista because it did not help. Flonase did not help either. She also received an 8-week trial of omeprazole without improvement. She is taking Singulair. Past Medical History:   Diagnosis Date    GERD (gastroesophageal reflux disease)     Gestational diabetes     Hypertension     Morbid obesity due to excess calories ([facility] [address] 2016    Severe persistent asthma 2019    Vitamin D deficiency     Xerosis cutis        Past Surgical History:   Procedure Laterality Date     SECTION         Current Outpatient Medications   Medication Sig Dispense Refill    brompheniramine-pseudoephedrine-DM (BROMFED DM) 2-30-10 MG/5ML syrup Take 5 mLs by mouth 4 times daily as needed for Cough 473 mL 1    Bempedoic Acid (NEXLETOL) 180 MG TABS Take 180 mg by mouth daily Stop atorvastatin due to elevated CPK 30 tablet 5    candesartan cilexetil-HCTZ (ATACAND HCT) 32-25 MG TABS Take 1 tablet by mouth daily 90 tablet 1    tiZANidine (ZANAFLEX) 2 MG tablet Take 1 tablet by mouth every 8 hours as needed (spasms) As needed 30 tablet 3    Caltrate 600+D Plus Minerals (CALTRATE) 600-800 MG-UNIT TABS tablet Take 1 tablet by mouth 2 times daily Okay to give generic equivalent and if not covered direct patient to buy OTC.  60 tablet 11    albuterol sulfate  (90 Base) MCG/ACT inhaler Inhale 2 puffs into the lungs every 4 hours as needed for Wheezing 1 Inhaler 6    aspirin EC 81 MG EC tablet Take 1 tablet by mouth daily 90 tablet 1    vitamin D3 (CHOLECALCIFEROL) 25 MCG (1000 UT) TABS tablet Take 2 tablets by mouth daily 60 tablet 11    fluticasone-salmeterol (ADVAIR) 500-50 MCG/DOSE diskus inhaler Inhale 1 puff into the lungs every 12 hours 1 Inhaler 3    montelukast (SINGULAIR) 10 MG tablet Take 1 tablet by mouth nightly 90 tablet 3    Gabapentin 10 % CREA Apply 1 cm2 topically 2 times daily as needed (if burning or electric sensation persists) Apply thin layer of cream over affected area. Wash hands after application. Stop use if side-effects occur and call your doctor. 2 Tube 1     No current facility-administered medications for this visit.         Allergies   Allergen Reactions    Chloroquine     Lisinopril      Coughing and choking    Mefloquine     Quinolones        FAMILY HISTORY  Maternal aunts- HTN    Social History     Socioeconomic History    Marital status:      Spouse name: Not on file    Number of children: Not on file    Years of education: Not on file    Highest education level: Not on file   Occupational History    Not on file   Social Needs    Financial resource strain: Not on file    Food insecurity     Worry: Not on file     Inability: Not on file    Transportation needs     Medical: Not on file     Non-medical: Not on file   Tobacco Use    Smoking status: Never Smoker    Smokeless tobacco: Never Used   Substance and Sexual Activity    Alcohol use: No     Alcohol/week: 0.0 standard drinks    Drug use: No    Sexual activity: Yes     Partners: Male   Lifestyle    Physical activity     Days per week: Not on file     Minutes per session: Not on file    Stress: Not on file   Relationships    Social connections     Talks on phone: Not on file     Gets together: Not on file     Attends Holiness service: Not on file     Active member of club or organization: Not on file     Attends meetings of clubs or organizations: Not on file     Relationship status: Not on file    Intimate partner violence     Fear of current or ex partner: Not on file     Emotionally abused: Not on file     Physically abused: Not on file     Forced sexual activity: Not on file   Other Topics Concern    Not on file   Social History Narrative    Not on file   Never smoked, but her mother smoked    Immunization History   Administered Date(s) Administered    Influenza A ([id number]) Vaccine Nasal 01/22/2010    Influenza Vaccine, unspecified formulation 12/02/2016    Influenza Virus Vaccine 12/30/2013, 12/02/2016, 10/25/2018    Influenza, Intradermal, Quadrivalent, Preservative Free 12/18/2017    Influenza, Quadv, IM, (6 mo and older Fluzone, Flulaval, Fluarix and 3 yrs and older Afluria) 10/25/2018    Influenza, Quadv, IM, PF (6 mo and older Fluzone, Flulaval, Fluarix, and 3 yrs and older Afluria) 12/30/2019, 12/17/2020    Pneumococcal Polysaccharide ([id number]) 12/30/2019    Tdap (Boostrix, Adacel) 03/28/2013, 11/27/2015       ROS:  GENERAL:  No fevers, no chills  RESPIRATORY:  No shortness of breath, + cough    PHYSICAL EXAM:  None. Virtual visit     Data reviewed:  Pulmonary Function Testing (9/16/19)  FVC 1.98 L at 76% predicted ---> 1.9L at 74% predicted  FEV1 1.74 L at 85% predicted ----> 1.72L at 84% predicted  FEV1/FVC ratio at 88% ---->91% predicted  TLC 4.27 L at 88% predicted  DLCO 25.12 at 104% predicted    Methacholine challenge: Positive at 16mg/ml    Overall: Normal flow measurements without significant reversal; normal diffusing capacity; methacholine challenge shows borderline bronchial hyperreactivity    Images and reports of chest imaging were reviewed by me. My interpretation is:  CXR (11/10/20): Clear lung fields  Chest CT (12/15/20):  No LAD; patchy areas of atelectasis bilaterally; focal areas of air trapping bilaterally; small area of lucency posterior to the trachea      Lab Results   Component Value Date    WBC 5.1 12/31/2020    HGB 13.3 12/31/2020    HCT 39.9 12/31/2020    MCV 85.7 12/31/2020     12/31/2020       No results found for: BNP    Lab Results   Component Value Date    CREATININE 0.7 12/31/2020    BUN 17 12/31/2020     12/31/2020    K 4.3 12/31/2020     12/31/2020    CO2 27 12/31/2020   Allergy panel: Negative  IgE- 32      Assessment/Plan:64y.o. year old female presents for follow up. Cough- Likely multifactorial: asthma, upper airway cough syndrome and GERD. She also has an enlarged left ventricle on chest CT. Echocardiogram is pending. Will refer to ENT for further evaluation. We discussed that if ENT evaluation is unrevealing bronchoscopy may be considered. She will continue [employer] and Bromfed-DM as needed. Asthma- She will continue Advair twice daily and albuterol inhaler as needed. Continue Singulair daily. Post-nasal drainage- Will give a trial of Astelin. She will return for follow-up in 4 weeks.        Rancho Sargent MD  [facility] Pulmonology, Critical Care and Sleep

## 2021-03-09 ENCOUNTER — TELEPHONE (OUTPATIENT)
Dept: PULMONOLOGY | Age: 61
End: 2021-03-09

## 2021-03-09 ENCOUNTER — VIRTUAL VISIT (OUTPATIENT)
Dept: PULMONOLOGY | Age: 61
End: 2021-03-09
Payer: COMMERCIAL

## 2021-03-09 DIAGNOSIS — R09.82 POST-NASAL DRIP: ICD-10-CM

## 2021-03-09 DIAGNOSIS — J45.20 MILD INTERMITTENT ASTHMA WITHOUT COMPLICATION: ICD-10-CM

## 2021-03-09 DIAGNOSIS — R05.9 COUGH: Primary | ICD-10-CM

## 2021-03-09 PROCEDURE — 99213 OFFICE O/P EST LOW 20 MIN: CPT | Performed by: INTERNAL MEDICINE

## 2021-03-09 RX ORDER — BENZONATATE 200 MG/1
200 CAPSULE ORAL 3 TIMES DAILY PRN
Qty: 90 CAPSULE | Refills: 1 | Status: SHIPPED | OUTPATIENT
Start: 2021-03-09 | End: 2021-08-02

## 2021-03-09 NOTE — PROGRESS NOTES
and reports of chest imaging were reviewed by me. My interpretation is:  CXR (11/10/20): Clear lung fields  Chest CT (12/15/20): No LAD; patchy areas of atelectasis bilaterally; focal areas of air trapping bilaterally; small area of lucency posterior to the trachea      Lab Results   Component Value Date    WBC 5.1 12/31/2020    HGB 13.3 12/31/2020    HCT 39.9 12/31/2020    MCV 85.7 12/31/2020     12/31/2020       No results found for: BNP    Lab Results   Component Value Date    CREATININE 0.7 12/31/2020    BUN 17 12/31/2020     12/31/2020    K 4.3 12/31/2020     12/31/2020    CO2 27 12/31/2020   Allergy panel: Negative  IgE- 32      Assessment/Plan:64y.o. year old female presents for follow up. Cough- Likely multifactorial: asthma, upper airway cough syndrome and GERD. Her cough has resolved with Tessalon Perles. We discussed that she may use this as needed. She is pending ENT evaluation as well as echocardiogram given enlarged left ventricle on chest CT. Asthma- We discussed that she may discontinue Advair, but should continue albuterol inhaler as needed. She will continue Singulair nightly. Post-nasal drainage- Continue Astelin. She will return for follow-up in 3 months.        Venus Ruelas MD  [facility] Pulmonology, Critical Care and Sleep

## 2021-03-11 ENCOUNTER — OFFICE VISIT (OUTPATIENT)
Dept: ORTHOPEDIC SURGERY | Age: 61
End: 2021-03-11
Payer: COMMERCIAL

## 2021-03-11 VITALS — TEMPERATURE: 97.8 F | WEIGHT: 261.91 LBS | HEIGHT: 65 IN | BODY MASS INDEX: 43.64 KG/M2

## 2021-03-11 DIAGNOSIS — M79.2 NEURALGIA: Primary | ICD-10-CM

## 2021-03-11 DIAGNOSIS — M17.11 PRIMARY OSTEOARTHRITIS OF RIGHT KNEE: ICD-10-CM

## 2021-03-11 DIAGNOSIS — M25.561 RIGHT KNEE PAIN, UNSPECIFIED CHRONICITY: ICD-10-CM

## 2021-03-11 PROCEDURE — 20610 DRAIN/INJ JOINT/BURSA W/O US: CPT | Performed by: PHYSICIAN ASSISTANT

## 2021-03-11 PROCEDURE — 99213 OFFICE O/P EST LOW 20 MIN: CPT | Performed by: PHYSICIAN ASSISTANT

## 2021-03-12 NOTE — PROGRESS NOTES
History     Socioeconomic History    Marital status:      Spouse name: Not on file    Number of children: Not on file    Years of education: Not on file    Highest education level: Not on file   Occupational History    Not on file   Social Needs    Financial resource strain: Not on file    Food insecurity     Worry: Not on file     Inability: Not on file    Transportation needs     Medical: Not on file     Non-medical: Not on file   Tobacco Use    Smoking status: Never Smoker    Smokeless tobacco: Never Used   Substance and Sexual Activity    Alcohol use: No     Alcohol/week: 0.0 standard drinks    Drug use: No    Sexual activity: Yes     Partners: Male   Lifestyle    Physical activity     Days per week: Not on file     Minutes per session: Not on file    Stress: Not on file   Relationships    Social connections     Talks on phone: Not on file     Gets together: Not on file     Attends Yazidi service: Not on file     Active member of club or organization: Not on file     Attends meetings of clubs or organizations: Not on file     Relationship status: Not on file    Intimate partner violence     Fear of current or ex partner: Not on file     Emotionally abused: Not on file     Physically abused: Not on file     Forced sexual activity: Not on file   Other Topics Concern    Not on file   Social History Narrative    Not on file       Current Outpatient Medications   Medication Sig Dispense Refill    Gabapentin 10 % CREA Apply 1 cm topically 2 times daily as needed (For burning sensation on outside of right thigh) 1 Tube 1    benzonatate (TESSALON) 200 MG capsule Take 1 capsule by mouth 3 times daily as needed for Cough 90 capsule 1    azelastine (ASTELIN) 0.1 % nasal spray 1 spray by Nasal route 2 times daily Use in each nostril as directed 1 Bottle 3    brompheniramine-pseudoephedrine-DM (BROMFED DM) 2-30-10 MG/5ML syrup Take 5 mLs by mouth 4 times daily as needed for Cough 473 mL 1    albuterol sulfate  (90 Base) MCG/ACT inhaler Inhale 2 puffs into the lungs every 4 hours as needed for Wheezing or Shortness of Breath 1 Inhaler 5    Bempedoic Acid (NEXLETOL) 180 MG TABS Take 180 mg by mouth daily Stop atorvastatin due to elevated CPK 30 tablet 5    candesartan cilexetil-HCTZ (ATACAND HCT) 32-25 MG TABS Take 1 tablet by mouth daily 90 tablet 1    tiZANidine (ZANAFLEX) 2 MG tablet Take 1 tablet by mouth every 8 hours as needed (spasms) As needed 30 tablet 3    Caltrate 600+D Plus Minerals (CALTRATE) 600-800 MG-UNIT TABS tablet Take 1 tablet by mouth 2 times daily Okay to give generic equivalent and if not covered direct patient to buy OTC. 60 tablet 11    aspirin EC 81 MG EC tablet Take 1 tablet by mouth daily 90 tablet 1    vitamin D3 (CHOLECALCIFEROL) 25 MCG (1000 UT) TABS tablet Take 2 tablets by mouth daily 60 tablet 11    fluticasone-salmeterol (ADVAIR) 500-50 MCG/DOSE diskus inhaler Inhale 1 puff into the lungs every 12 hours 1 Inhaler 3    montelukast (SINGULAIR) 10 MG tablet Take 1 tablet by mouth nightly 90 tablet 3     No current facility-administered medications for this visit. Allergies   Allergen Reactions    Chloroquine     Lisinopril      Coughing and choking    Mefloquine     Quinolones          Review of Systems:  A 14 point review of systems was completed by the patient and is available in the media section of the scanned medical record and was reviewed on 3/12/2021. The review is negative with the exception of those things mentioned in the HPI and Past Medical History         Vital Signs:   Temp 97.8 °F (36.6 °C) (Infrared)   Ht 5' 5\" (1.651 m)   Wt 261 lb 14.5 oz (118.8 kg)   LMP 10/10/2014   BMI 43.58 kg/m²     General Exam:    General: Юлия Barahona is a healthy and well appearing 64y.o. year old female who is sitting comfortably in our office in no acute distress. Neuro: Alert & Oriented x 3,  normal,  no focal deficits noted.  Normal mood & affect. Eyes: Sclera clear  Ears: Normal external ear  Mouth: Patient wearing a mask  Pulm: Respirations unlabored and regular   Skin: Warm, well perfused      Knee Examination: Right    Inspection: Healed scars from prior surgery. No effusion, ecchymosis, discoloration, erythema, excessive warmth. no gross deformities, no signs of infection. Palpation: There is patellofemoral crepitus, there is medial joint line tenderness. No other osseous or soft tissue tenderness around the knee. Negative calf tenderness    Range of Motion: 0-125 degrees without pain or difficulty    Strength: Grossly intact    Special Tests: No ligament instability, valgus and varus stress test are stable at 0 and 30°. Negative Homans sign    Gait:  Steady, Non antalgic, without the use of assistive devices    Alignment: Varus deformity    Neuro: Sensation equal bilateral lower extremities    Vascular: 2+ posterior tibialis pulse          Radiology:   Previously obtained x-ray imaging reviewed. No new imaging obtained today. Office Procedures:  Orders Placed This Encounter   Procedures    SYNVISC OR SYNVISC-ONE INJECTION (For Auth/Precert)     Standing Status:   Future     Standing Expiration Date:   3/11/2022     After informed consent was provided, the patient was seated on the exam table with the right knee flexed to 90 degrees. The anterolateral aspect of the knee adjacent to the joint line was prepped with Chlora-prep. The skin and subcutaneous tissues were anesthetized with ethyl chloride spray. A 22-gauge needle was inserted into the right knee and 2 ml of 40 mg/ml DepoMedrol was injected. The needle was withdrawn and the puncture site sealed with a Band-Aid. The patient tolerated the procedure well. Post injection instructions and precautions given and any problems to notify us. Assessment: Patient is a 64 y.o. female presenting to the office for follow-up evaluation of her right knee pain.   Patient is being

## 2021-03-18 ENCOUNTER — TELEPHONE (OUTPATIENT)
Dept: ORTHOPEDIC SURGERY | Age: 61
End: 2021-03-18

## 2021-03-18 NOTE — TELEPHONE ENCOUNTER
03/18/2021  SYNVISC-ONE   RIGHT KNEE. DENIED  NO COVERAGE - CASH BASED PROGRAM. PER LEADERSHIP.  NOT A COVERED BENEFIT FOR THIS SELF FUNDED BCBS OF [place],     PER MARLENI ROJAS, NOT MEDICALLY NECESSARY. NOT COVERED, REF # [id number] . AP    ALSO SPECIAL[facility] PH[facility], [facility] HAD ME RUN A REQUEST THROUGH [facility] AND THAT WAS DENIED. [id number].  AP

## 2021-03-22 ENCOUNTER — TELEPHONE (OUTPATIENT)
Dept: ORTHOPEDIC SURGERY | Age: 61
End: 2021-03-22

## 2021-03-22 ENCOUNTER — OFFICE VISIT (OUTPATIENT)
Dept: ENT CLINIC | Age: 61
End: 2021-03-22
Payer: COMMERCIAL

## 2021-03-22 VITALS
OXYGEN SATURATION: 97 % | BODY MASS INDEX: 41.93 KG/M2 | SYSTOLIC BLOOD PRESSURE: 116 MMHG | DIASTOLIC BLOOD PRESSURE: 81 MMHG | WEIGHT: 252 LBS | TEMPERATURE: 97.5 F | HEART RATE: 83 BPM

## 2021-03-22 DIAGNOSIS — R05.3 CHRONIC COUGH: Primary | ICD-10-CM

## 2021-03-22 DIAGNOSIS — K21.9 LARYNGOPHARYNGEAL REFLUX (LPR): ICD-10-CM

## 2021-03-22 PROCEDURE — 31575 DIAGNOSTIC LARYNGOSCOPY: CPT | Performed by: OTOLARYNGOLOGY

## 2021-03-22 PROCEDURE — 99204 OFFICE O/P NEW MOD 45 MIN: CPT | Performed by: OTOLARYNGOLOGY

## 2021-03-22 RX ORDER — PANTOPRAZOLE SODIUM 40 MG/1
40 TABLET, DELAYED RELEASE ORAL
Qty: 90 TABLET | Refills: 1 | Status: SHIPPED | OUTPATIENT
Start: 2021-03-22 | End: 2021-08-02 | Stop reason: SDUPTHER

## 2021-03-22 NOTE — TELEPHONE ENCOUNTER
General Question     Subject: Compound Cream    Patient and /or Facility Request: Silvino Kirby from [facility]   Silvino Kirby is calling from [facility] about the compound cream. She needs to know how much in the tube you want. Insurance needs it by the pounds, and not by tube.       [phone]  Contact Number: Silvino Kirby

## 2021-03-22 NOTE — PROGRESS NOTES
tonsils; see below  Nose:Normal external nasal appearance. Anterior rhinoscopy shows a rightward deviated septum. Normal turbinates. Normal mucosa   NECK: Normal range of motion, no thyromegaly, trachea is midline, no lymphadenopathy, no neck masses, no crepitus  CHEST: Normal respiratory effort, no retractions, breathing comfortably  SKIN: No rashes, normal appearing skin, no evidence of skin lesions/tumors  Neuro:  cranial nerve II-XII intact; normal gait  Cardio:  no edema        RADIOLOGY  Summary of findings:  I reviewed the CT the patient's chest that she had performed in December 2020. This only goes up to about the cricoid, but I do not appreciate any tracheal lesions    REVIEW OF MEDICAL RECORDS  I reviewed Dr. Goyo Brown notes from pulmonology      PROCEDURE  Flexible laryngoscopy  Afrin and lidocaine were applied the bilateral nasal cavity  Flexible scope was passed to left nasal cavity. Nasopharynx was normal.  Tongue vallecula normal.  She had a bit of interarytenoid edema suggesting ongoing reflux. Otherwise relatively normal exam        ASSESSMENT/PLAN  1. Chronic cough  Most common etiologies include acid reflux, allergy and asthma. Her asthma is been addressed. She claims to have been intermittently treated for reflux her entire life. I want to give her a 6-week course of Protonix. Depending on how she responds to this, we could consider gastroenterology as well. Also given that she is responding to [facility], she could have a neurogenic cough. If the Protonix made no difference whatsoever, consider having her see our speech therapist for further evaluation and treatment for a neurogenic cough. 2. Laryngopharyngeal reflux (LPR)  As above             I have performed a head and neck physical exam personally or was physically present during the key or critical portions of the service.     This note was generated completely or in part utilizing Dragon dictation speech recognition software. Occasionally, words are mistranscribed and despite editing, the text may contain inaccuracies due to incorrect word recognition. If further clarification is needed please contact the office at [phone].

## 2021-03-23 NOTE — PROGRESS NOTES
2cc Depo  Lot#: [id number]  Exp: 04/30/2022  NDC: 6103-7863-14      Injection site: Right knee     Office Supplied

## 2021-03-26 ENCOUNTER — VIRTUAL VISIT (OUTPATIENT)
Dept: PRIMARY CARE CLINIC | Age: 61
End: 2021-03-26
Payer: COMMERCIAL

## 2021-03-26 ENCOUNTER — HOSPITAL ENCOUNTER (OUTPATIENT)
Dept: NON INVASIVE DIAGNOSTICS | Age: 61
Discharge: HOME | End: 2021-03-26
Payer: COMMERCIAL

## 2021-03-26 DIAGNOSIS — R25.2 LEG CRAMPS: Primary | ICD-10-CM

## 2021-03-26 DIAGNOSIS — Z01.89 LABORATORY TEST: ICD-10-CM

## 2021-03-26 LAB
LV EF: 65 %
LVEF MODALITY: NORMAL

## 2021-03-26 PROCEDURE — 99212 OFFICE O/P EST SF 10 MIN: CPT | Performed by: INTERNAL MEDICINE

## 2021-03-26 PROCEDURE — 93306 TTE W/DOPPLER COMPLETE: CPT

## 2021-03-26 ASSESSMENT — ENCOUNTER SYMPTOMS
BLOOD IN STOOL: 0
ABDOMINAL PAIN: 0
WHEEZING: 0
EYES NEGATIVE: 1
COUGH: 0
DIARRHEA: 0
GASTROINTESTINAL NEGATIVE: 1
ABDOMINAL DISTENTION: 0
CONSTIPATION: 0
CHEST TIGHTNESS: 0
SHORTNESS OF BREATH: 0

## 2021-03-26 NOTE — PROGRESS NOTES
Subjective:      Patient ID: Shelly To is a 64 y.o. female. 3/26/2021 Patient presents with:  Results: discuss results from 12/2020  Other: cramps in legs off and on merissa after lying on leather couch ? ?              Last seen 4/30/2020 Patient presents with:  Lower Back Pain: just started a few hrs ago now feels spasms and extreme pain   Knee Pain: better with Celebrex . Also got a shot from Ortho a few hrs ago               Last seen  By VV4/22/2020 Patient presents with:  Pain: rt knee and thigh > 6 mths . Getting unbearable now     Has seen Ortho . nsaids not helping . Tried PT but no relief                 Last seen by me 12/30/2019   Dr Jalen Paredes Patient presents with: Annual Exam. Needs for Work              Review of Systems   Constitutional: Negative for activity change, appetite change, fatigue, fever and unexpected weight change. Tdap 2015     Flu Vac 12/19     Pneum Vac     Shingrex/ script  12/30/19    Covid Vac 2/21    HENT: Negative. Dental ex reg    Eyes: Negative. Negative for visual disturbance. Last Eye exam  12/19   Respiratory: Negative for cough, chest tightness, shortness of breath and wheezing. Does not smoke . Rare Etoh     Asthma  >>   Cardiovascular: Negative. HTN  2009     No known CAD . No FH either     Fairly active    Gastrointestinal: Negative. Negative for abdominal distention, abdominal pain, blood in stool, constipation and diarrhea. No FH of Ca colon     Colonoscopy 2018    Endocrine:        No FH of diabetes    Genitourinary: Negative for dysuria, frequency, menstrual problem, urgency and vaginal discharge. Natural menopause 2017     Last pelvic / pap 2017     One 12 yr old  . Gestational Diabetes     Mammogram 3/21   Musculoskeletal:        H/O Torn Meniscus rt    Allergic/Immunologic: Positive for environmental allergies. Negative for food allergies. Neurological: Negative for dizziness, weakness and headaches. Psychiatric/Behavioral: Negative for behavioral problems, dysphoric mood and sleep disturbance. The patient is not nervous/anxious. Objective:   Physical Exam  Constitutional:       General: She is not in acute distress. Comments: Vitals as last noted    Pulmonary:      Effort: Pulmonary effort is normal.   Musculoskeletal:      Lumbar back: She exhibits decreased range of motion, pain and spasm. Neurological:      Mental Status: She is oriented to person, place, and time. Assessment:      Юлия Barahona is a 64 y.o. female evaluated via  [address]  on 3/26/2021. Consent:  She and/or health care decision maker is aware that that she may receive a bill for this telephone service, depending on her insurance coverage, and has provided verbal consent to proceed: Yes      Documentation:  I communicated with the patient  maker about this  Details of this discussion including any medical advice provided: as noted       I affirm this is a Patient Initiated Episode with an Established Patient who has not had a related appointment within my department in the past 7 days or scheduled within the next 24 hours. Patient identification was verified at the start of the visit: Yes    Total Time: minutes: 5-10 minutes    Note: not billable if this call serves to triage the patient into an appointment for the relevant concern      [address]:   Юлия was seen today for results and other. Diagnoses and all orders for this visit:    Leg cramps  Nl electrolytes . ? Etiology . Possible compression due to weight ? ?    Laboratory test  Nl labs .  Reviewed with Mike Larry MD

## 2021-03-31 ENCOUNTER — APPOINTMENT (OUTPATIENT)
Dept: GENERAL RADIOLOGY | Age: 61
End: 2021-03-31
Payer: COMMERCIAL

## 2021-03-31 ENCOUNTER — HOSPITAL ENCOUNTER (EMERGENCY)
Age: 61
Discharge: HOME | End: 2021-03-31
Attending: EMERGENCY MEDICINE
Payer: COMMERCIAL

## 2021-03-31 VITALS
DIASTOLIC BLOOD PRESSURE: 65 MMHG | HEART RATE: 70 BPM | RESPIRATION RATE: 18 BRPM | SYSTOLIC BLOOD PRESSURE: 134 MMHG | HEIGHT: 65 IN | WEIGHT: 253.38 LBS | OXYGEN SATURATION: 97 % | TEMPERATURE: 97.9 F | BODY MASS INDEX: 42.21 KG/M2

## 2021-03-31 DIAGNOSIS — S20.219A CONTUSION OF CHEST WALL, UNSPECIFIED LATERALITY, INITIAL ENCOUNTER: ICD-10-CM

## 2021-03-31 DIAGNOSIS — S40.021A CONTUSION OF RIGHT ARM, INITIAL ENCOUNTER: Primary | ICD-10-CM

## 2021-03-31 PROCEDURE — 73030 X-RAY EXAM OF SHOULDER: CPT

## 2021-03-31 PROCEDURE — 71046 X-RAY EXAM CHEST 2 VIEWS: CPT

## 2021-03-31 PROCEDURE — 6370000000 HC RX 637 (ALT 250 FOR IP): Performed by: EMERGENCY MEDICINE

## 2021-03-31 PROCEDURE — 99284 EMERGENCY DEPT VISIT MOD MDM: CPT

## 2021-03-31 PROCEDURE — 73080 X-RAY EXAM OF ELBOW: CPT

## 2021-03-31 RX ORDER — METHOCARBAMOL 750 MG/1
750 TABLET, FILM COATED ORAL 3 TIMES DAILY PRN
Qty: 30 TABLET | Refills: 0 | Status: SHIPPED | OUTPATIENT
Start: 2021-03-31 | End: 2021-04-10

## 2021-03-31 RX ORDER — IBUPROFEN 600 MG/1
600 TABLET ORAL ONCE
Status: COMPLETED | OUTPATIENT
Start: 2021-03-31 | End: 2021-03-31

## 2021-03-31 RX ORDER — IBUPROFEN 600 MG/1
600 TABLET ORAL EVERY 8 HOURS PRN
Qty: 20 TABLET | Refills: 0 | Status: SHIPPED | OUTPATIENT
Start: 2021-03-31 | End: 2021-07-20

## 2021-03-31 RX ADMIN — IBUPROFEN 600 MG: 600 TABLET, FILM COATED ORAL at 17:17

## 2021-03-31 ASSESSMENT — PAIN DESCRIPTION - ORIENTATION
ORIENTATION: RIGHT
ORIENTATION: RIGHT

## 2021-03-31 ASSESSMENT — PAIN DESCRIPTION - PAIN TYPE: TYPE: ACUTE PAIN

## 2021-03-31 ASSESSMENT — PAIN DESCRIPTION - LOCATION
LOCATION: SHOULDER
LOCATION: SHOULDER

## 2021-03-31 NOTE — ED PROVIDER NOTES
TRIAGE CHIEF COMPLAINT:   Chief Complaint   Patient presents with    Shoulder Injury     yesterday, was on the tread mill and lost balance, fell and landed on right shoulder. +pain right shoulder limited movement         HPI: Zarina Zepeda is a 64 y.o. female who presents to the Emergency Department with complaint of right arm pain and bilateral chest pain. Patient tripped and fell while on the treadmill yesterday falling forward and hitting her chest and right arm. Denies head injury or headache. No loss of consciousness. Denies any neck pain. No lower extremity injury. She has no numbness or weakness. Denies shortness of breath. No abdominal pain. On subsequent questioning the patient states she also has some slight pain in her left knee. REVIEW OF SYSTEMS:  6 systems reviewed. Pertinent positives per HPI. Otherwise noted to be negative. Nursing notes reviewed and agree with above. Past medical/surgical history reviewed. MEDICATIONS   Patient's Medications   New Prescriptions    No medications on file   Previous Medications    ALBUTEROL SULFATE  (90 BASE) MCG/ACT INHALER    Inhale 2 puffs into the lungs every 4 hours as needed for Wheezing or Shortness of Breath    ASPIRIN EC 81 MG EC TABLET    Take 1 tablet by mouth daily    AZELASTINE (ASTELIN) 0.1 % NASAL SPRAY    1 spray by Nasal route 2 times daily Use in each nostril as directed    BEMPEDOIC ACID (NEXLETOL) 180 MG TABS    Take 180 mg by mouth daily Stop atorvastatin due to elevated CPK    BENZONATATE (TESSALON) 200 MG CAPSULE    Take 1 capsule by mouth 3 times daily as needed for Cough    BROMPHENIRAMINE-PSEUDOEPHEDRINE-DM (BROMFED DM) 2-30-10 MG/5ML SYRUP    Take 5 mLs by mouth 4 times daily as needed for Cough    CALTRATE 600+D PLUS MINERALS (CALTRATE) 600-800 MG-UNIT TABS TABLET    Take 1 tablet by mouth 2 times daily Okay to give generic equivalent and if not covered direct patient to buy OTC.     CANDESARTAN CILEXETIL-HCTZ (ATACAND effusion. Range of motion is 0 to 100 degrees. Skin:   No rashes or lesions to exposed skin  Back:   No spine or posterior rib tenderness. No CVA tenderness. Neuro:  Alert and OX3. Speech clear and appropriate. No upper/lower extremity weakness. Normal sensation in all extremities. No facial asymmetry or weakness. Patient walks briskly and without any limping or difficulty. Psych:   Affect normal. Mood normal        RADIOLOGY:  XR ELBOW RIGHT (MIN 3 VIEWS)   Final Result      No fracture            Right shoulder 3 views      HISTORY: Same      Acromioclavicular alignment and glenohumeral alignment are both anatomical. Mild hypertrophic spurring at the [facility] joint. No fracture or dislocation of the proximal humerus. Periarticular calcification probably relates to calcific tendinitis      IMPRESSION:      AC joint arthropathy. No fracture            PA And lateral chest      HISTORY: Same      Skeletal thorax intact. No pneumothorax. Lungs clear. Cardiac size and mediastinal contour normal.      IMPRESSION:      Unremarkable study. XR SHOULDER RIGHT (MIN 2 VIEWS)   Final Result      No fracture            Right shoulder 3 views      HISTORY: Same      Acromioclavicular alignment and glenohumeral alignment are both anatomical. Mild hypertrophic spurring at the [facility] joint. No fracture or dislocation of the proximal humerus. Periarticular calcification probably relates to calcific tendinitis      IMPRESSION:      AC joint arthropathy. No fracture            PA And lateral chest      HISTORY: Same      Skeletal thorax intact. No pneumothorax. Lungs clear. Cardiac size and mediastinal contour normal.      IMPRESSION:      Unremarkable study. XR CHEST (2 VW)   Final Result      No fracture            Right shoulder 3 views      HISTORY: Same      Acromioclavicular alignment and glenohumeral alignment are both anatomical. Mild hypertrophic spurring at the [facility] joint.       No --chest wall contusion     3 --contusion of left knee.               Daphne Payton MD  03/31/21 [address] [facility] Cal Arriaga MD  03/31/21 [id number]

## 2021-05-03 ENCOUNTER — OFFICE VISIT (OUTPATIENT)
Dept: ENT CLINIC | Age: 61
End: 2021-05-03
Payer: COMMERCIAL

## 2021-05-03 VITALS
WEIGHT: 243 LBS | HEIGHT: 65 IN | DIASTOLIC BLOOD PRESSURE: 72 MMHG | TEMPERATURE: 98 F | BODY MASS INDEX: 40.48 KG/M2 | SYSTOLIC BLOOD PRESSURE: 115 MMHG

## 2021-05-03 DIAGNOSIS — R05.3 CHRONIC COUGH: Primary | ICD-10-CM

## 2021-05-03 DIAGNOSIS — K21.9 LARYNGOPHARYNGEAL REFLUX (LPR): ICD-10-CM

## 2021-05-03 PROCEDURE — 99213 OFFICE O/P EST LOW 20 MIN: CPT | Performed by: OTOLARYNGOLOGY

## 2021-05-03 NOTE — PROGRESS NOTES
she can make such as cutting out caffeine. She is also already worked on some changes. She still doing okay in 2 to 3 months, I would have her try to wean off the Protonix. She still having issues we will have her see gastroenterology versus our speech therapist.    2. Laryngopharyngeal reflux (LPR)  As above             I have performed a head and neck physical exam personally or was physically present during the key or critical portions of the service. This note was generated completely or in part utilizing Dragon dictation speech recognition software. Occasionally, words are mistranscribed and despite editing, the text may contain inaccuracies due to incorrect word recognition. If further clarification is needed please contact the office at [phone].

## 2021-05-15 ENCOUNTER — HOSPITAL ENCOUNTER (EMERGENCY)
Age: 61
Discharge: HOME | End: 2021-05-15
Attending: STUDENT IN AN ORGANIZED HEALTH CARE EDUCATION/TRAINING PROGRAM
Payer: COMMERCIAL

## 2021-05-15 ENCOUNTER — APPOINTMENT (OUTPATIENT)
Dept: GENERAL RADIOLOGY | Age: 61
End: 2021-05-15
Payer: COMMERCIAL

## 2021-05-15 VITALS
RESPIRATION RATE: 18 BRPM | OXYGEN SATURATION: 100 % | HEART RATE: 73 BPM | SYSTOLIC BLOOD PRESSURE: 154 MMHG | DIASTOLIC BLOOD PRESSURE: 87 MMHG | TEMPERATURE: 98.6 F

## 2021-05-15 DIAGNOSIS — M25.562 ACUTE PAIN OF LEFT KNEE: Primary | ICD-10-CM

## 2021-05-15 PROCEDURE — 99283 EMERGENCY DEPT VISIT LOW MDM: CPT

## 2021-05-15 PROCEDURE — 6370000000 HC RX 637 (ALT 250 FOR IP): Performed by: STUDENT IN AN ORGANIZED HEALTH CARE EDUCATION/TRAINING PROGRAM

## 2021-05-15 PROCEDURE — 73562 X-RAY EXAM OF KNEE 3: CPT

## 2021-05-15 RX ORDER — IBUPROFEN 400 MG/1
800 TABLET ORAL ONCE
Status: COMPLETED | OUTPATIENT
Start: 2021-05-15 | End: 2021-05-15

## 2021-05-15 RX ADMIN — IBUPROFEN 800 MG: 400 TABLET, FILM COATED ORAL at 16:48

## 2021-05-15 ASSESSMENT — ENCOUNTER SYMPTOMS
SHORTNESS OF BREATH: 0
ABDOMINAL PAIN: 0
BACK PAIN: 0
COUGH: 0
NAUSEA: 0
SORE THROAT: 0
VOMITING: 0

## 2021-05-15 ASSESSMENT — PAIN SCALES - GENERAL: PAINLEVEL_OUTOF10: 8

## 2021-05-15 NOTE — ED NOTES
Discharge paperwork given to and reviewed with pt. Pt verbalized understanding and all questions answered. Pt encouraged to return if having worsening symptoms or new symptoms discussed in discharge paperwork. Pt to follow up with PCP if needed    Pt in NAD, RR even and unlabored.  Pt off unit ambulatory     Sybil [facility]  05/15/21 [id number]

## 2021-05-15 NOTE — ED PROVIDER NOTES
[address] [facility]          ATTENDING PHYSICIAN NOTE       Date of evaluation: 5/15/2021    Chief Complaint     Knee Pain (pt reports hitting L knee on open part of door last night. pt reports that she took ibuprofen without relief of pain. pt reports pain has increasingly gotten worse. Ambulatory to triage)    History of Present Illness     Юлия Barahona is a 64 y.o. female who presents with left anterior knee pain after hitting it on an open door yesterday. Patient states that she was walking in her home when she struck her anterior knee on the side of an open door, denies fall but states that pain has worsened overnight and into this afternoon prompting emergency department presentation. Patient is able to ambulate without significant difficulty but states that it is painful, denies additional injuries, states that she took small dose of ibuprofen yesterday without improvement. Review of Systems     Review of Systems   Constitutional: Negative for chills, fatigue and fever. HENT: Negative for congestion and sore throat. Respiratory: Negative for cough and shortness of breath. Cardiovascular: Negative for chest pain and palpitations. Gastrointestinal: Negative for abdominal pain, nausea and vomiting. Musculoskeletal: Negative for back pain and neck pain. Skin: Negative for rash and wound. Neurological: Negative for light-headedness and headaches. Hematological: Does not bruise/bleed easily. Psychiatric/Behavioral: Negative for confusion. Past Medical, Surgical, Family, and Social History     She has a past medical history of GERD (gastroesophageal reflux disease), Gestational diabetes, Hypertension, Morbid obesity due to excess calories ([address] Severe persistent asthma, Vitamin D deficiency, and Xerosis cutis. She has a past surgical history that includes  section (). Her family history is not on file.   She reports that she has never

## 2021-05-18 DIAGNOSIS — I10 ESSENTIAL HYPERTENSION: ICD-10-CM

## 2021-05-18 RX ORDER — CANDESARTAN CILEXETIL AND HYDROCHLOROTHIAZIDE 32; 25 MG/1; MG/1
1 TABLET ORAL DAILY
Qty: 30 TABLET | Refills: 0 | OUTPATIENT
Start: 2021-05-18 | End: 2021-07-29

## 2021-05-18 NOTE — TELEPHONE ENCOUNTER
Medication:   Requested Prescriptions     Pending Prescriptions Disp Refills    candesartan cilexetil-HCTZ (ATACAND HCT) 32-25 MG TABS [Pharmacy Med Name: CANDESARTAN-HCTZ 32-25 MG TAB] 30 tablet 0     Sig: Take 1 tablet by mouth daily        Last Filled:      Patient Phone Number: [phone] (home) [phone] (work)    Last appt: 3/26/2021   Next appt: Visit date not found    Last OARRS: No flowsheet data found.

## 2021-06-15 ENCOUNTER — TELEPHONE (OUTPATIENT)
Dept: ORTHOPEDIC SURGERY | Age: 61
End: 2021-06-15

## 2021-06-15 NOTE — TELEPHONE ENCOUNTER
I tried to call patient to see if she could come in earlier or reschedule if she was wanting to see Dr. Vasquez edilson. Called [phone] (home) [phone] (work), also number [phone]. Got VM both times I called and mailbox was full on one and I could not leave a message. Additional Safety/Bands:

## 2021-06-22 DIAGNOSIS — I10 ESSENTIAL HYPERTENSION: ICD-10-CM

## 2021-06-28 RX ORDER — CANDESARTAN 32 MG/1
32 TABLET ORAL DAILY
Qty: 30 TABLET | Refills: 3 | Status: SHIPPED | OUTPATIENT
Start: 2021-06-28 | End: 2021-08-02 | Stop reason: SDUPTHER

## 2021-07-19 ENCOUNTER — OFFICE VISIT (OUTPATIENT)
Dept: ENT CLINIC | Age: 61
End: 2021-07-19
Payer: COMMERCIAL

## 2021-07-19 VITALS — HEART RATE: 94 BPM | DIASTOLIC BLOOD PRESSURE: 74 MMHG | SYSTOLIC BLOOD PRESSURE: 181 MMHG

## 2021-07-19 DIAGNOSIS — R05.3 CHRONIC COUGH: Primary | ICD-10-CM

## 2021-07-19 DIAGNOSIS — K21.9 LARYNGOPHARYNGEAL REFLUX (LPR): ICD-10-CM

## 2021-07-19 PROCEDURE — 99213 OFFICE O/P EST LOW 20 MIN: CPT | Performed by: OTOLARYNGOLOGY

## 2021-07-19 NOTE — PROGRESS NOTES
[address] [facility]- HEAD & NECK SURGERY  Follow up      Patient Name: Marita Espinoza  Medical Record Number:  <[id number]  Primary Care Physician:  Bia Brown MD  Date of Consultation: 7/19/2021    Chief Complaint: Cough        Interval History  Patient is following up for her chronic cough secondary to reflux. I last saw her in May. At that time she was doing well. She said that her cough came back a little bit, but not nearly as severe as it was in the past.  She restarted Protonix and is already telling a difference. Otherwise no new symptoms. She has made an effort to cut certain things out of her diet including dairy and caffeine. REVIEW OF SYSTEMS  As above    PHYSICAL EXAM  GENERAL: No Acute Distress, Alert and Oriented, no Hoarseness, strong voice  EYES: EOMI, Anti-icteric  HENT:   Head: Normocephalic and atraumatic. Face:  Symmetric, facial nerve intact, no sinus tenderness  Right Ear: Normal external ear, normal external auditory canal, intact tympanic membrane with normal mobility and aerated middle ear  Left Ear: Normal external ear, normal external auditory canal, intact tympanic membrane with normal mobility and aerated middle ear  Mouth/Oral Cavity:  normal lips, Uvula is midline, no mucosal lesions  Oropharynx/Larynx:  normal oropharynx, somewhat large cryptic tonsils; normal larynx/nasopharynx on mirror exam  Nose:Normal external nasal appearance. NECK: Normal range of motion, no thyromegaly, trachea is midline, no lymphadenopathy, no neck masses, no crepitus  CHEST: Normal respiratory effort, no retractions, breathing comfortably  SKIN: No rashes, normal appearing skin, no evidence of skin lesions/tumors  Neuro:  cranial nerve II-XII intact; normal gait  Cardio:  no edema              ASSESSMENT/PLAN  1. Chronic cough  We discussed ongoing management. She is already attempted some dietary changes for the reflux. She is restarted the Protonix.   She is interested in seeing gastroenterologist to see if anything else needs to be done. I think it is reasonable for patient to end up on chronic PPIs to see a gastroenterologist to rule out any long-term side effects from reflux so I have referred her. She will follow-up with me afterwards. - Fernando Mora MD, Gastroenterology, [facility]    2. Laryngopharyngeal reflux (LPR)  As above  - [facility] - Trevor Espinoza MD, Gastroenterology, [facility]             I have performed a head and neck physical exam personally or was physically present during the key or critical portions of the service. This note was generated completely or in part utilizing Dragon dictation speech recognition software. Occasionally, words are mistranscribed and despite editing, the text may contain inaccuracies due to incorrect word recognition. If further clarification is needed please contact the office at [phone].

## 2021-07-20 ENCOUNTER — HOSPITAL ENCOUNTER (EMERGENCY)
Age: 61
Discharge: HOME | End: 2021-07-20
Payer: COMMERCIAL

## 2021-07-20 ENCOUNTER — APPOINTMENT (OUTPATIENT)
Dept: GENERAL RADIOLOGY | Age: 61
End: 2021-07-20
Payer: COMMERCIAL

## 2021-07-20 VITALS
WEIGHT: 244 LBS | SYSTOLIC BLOOD PRESSURE: 182 MMHG | TEMPERATURE: 99.1 F | HEART RATE: 70 BPM | DIASTOLIC BLOOD PRESSURE: 93 MMHG | HEIGHT: 64 IN | RESPIRATION RATE: 16 BRPM | OXYGEN SATURATION: 100 % | BODY MASS INDEX: 41.66 KG/M2

## 2021-07-20 DIAGNOSIS — S40.021A ARM CONTUSION, RIGHT, INITIAL ENCOUNTER: Primary | ICD-10-CM

## 2021-07-20 DIAGNOSIS — S46.811A TRAPEZIUS STRAIN, RIGHT, INITIAL ENCOUNTER: ICD-10-CM

## 2021-07-20 PROCEDURE — 73060 X-RAY EXAM OF HUMERUS: CPT

## 2021-07-20 PROCEDURE — 6370000000 HC RX 637 (ALT 250 FOR IP): Performed by: PHYSICIAN ASSISTANT

## 2021-07-20 PROCEDURE — 99283 EMERGENCY DEPT VISIT LOW MDM: CPT

## 2021-07-20 PROCEDURE — 96372 THER/PROPH/DIAG INJ SC/IM: CPT

## 2021-07-20 PROCEDURE — 6360000002 HC RX W HCPCS: Performed by: PHYSICIAN ASSISTANT

## 2021-07-20 RX ORDER — KETOROLAC TROMETHAMINE 30 MG/ML
15 INJECTION, SOLUTION INTRAMUSCULAR; INTRAVENOUS ONCE
Status: COMPLETED | OUTPATIENT
Start: 2021-07-20 | End: 2021-07-20

## 2021-07-20 RX ORDER — METHOCARBAMOL 500 MG/1
1000 TABLET, FILM COATED ORAL ONCE
Status: COMPLETED | OUTPATIENT
Start: 2021-07-20 | End: 2021-07-20

## 2021-07-20 RX ORDER — NAPROXEN 500 MG/1
500 TABLET ORAL 2 TIMES DAILY PRN
Qty: 30 TABLET | Refills: 0 | Status: SHIPPED | OUTPATIENT
Start: 2021-07-20 | End: 2021-08-02

## 2021-07-20 RX ORDER — METHOCARBAMOL 500 MG/1
1000 TABLET, FILM COATED ORAL 3 TIMES DAILY
Qty: 60 TABLET | Refills: 0 | Status: SHIPPED | OUTPATIENT
Start: 2021-07-20 | End: 2021-07-30

## 2021-07-20 RX ADMIN — KETOROLAC TROMETHAMINE 15 MG: 30 INJECTION, SOLUTION INTRAMUSCULAR; INTRAVENOUS at 20:55

## 2021-07-20 RX ADMIN — METHOCARBAMOL 1000 MG: 500 TABLET ORAL at 20:53

## 2021-07-20 ASSESSMENT — PAIN DESCRIPTION - FREQUENCY: FREQUENCY: CONTINUOUS

## 2021-07-20 ASSESSMENT — PAIN SCALES - GENERAL
PAINLEVEL_OUTOF10: 9
PAINLEVEL_OUTOF10: 9

## 2021-07-20 ASSESSMENT — PAIN DESCRIPTION - DESCRIPTORS: DESCRIPTORS: THROBBING;SHOOTING

## 2021-07-20 ASSESSMENT — PAIN DESCRIPTION - DIRECTION: RADIATING_TOWARDS: HAND

## 2021-07-20 ASSESSMENT — PAIN DESCRIPTION - ORIENTATION: ORIENTATION: RIGHT

## 2021-07-20 ASSESSMENT — PAIN DESCRIPTION - LOCATION: LOCATION: ARM

## 2021-07-21 NOTE — ED NOTES
Patient discharged to home alone. Patient verbalized understanding of discharge instructions. Advised of when to return to ED and when to call 911. Advised of follow up with PCP. Patient received 2 Rx's with education. Patient verbalized understanding of education. No questions from patient to this RN. Patient left ED without incident.       Radha Ramos RN  07/20/21 5214

## 2021-07-29 ENCOUNTER — NURSE TRIAGE (OUTPATIENT)
Dept: OTHER | Facility: CLINIC | Age: 61
End: 2021-07-29

## 2021-07-29 ENCOUNTER — HOSPITAL ENCOUNTER (EMERGENCY)
Age: 61
Discharge: HOME | End: 2021-07-29
Attending: EMERGENCY MEDICINE
Payer: COMMERCIAL

## 2021-07-29 ENCOUNTER — APPOINTMENT (OUTPATIENT)
Dept: GENERAL RADIOLOGY | Age: 61
End: 2021-07-29
Payer: COMMERCIAL

## 2021-07-29 VITALS
SYSTOLIC BLOOD PRESSURE: 188 MMHG | WEIGHT: 250 LBS | OXYGEN SATURATION: 95 % | BODY MASS INDEX: 41.65 KG/M2 | RESPIRATION RATE: 14 BRPM | HEART RATE: 61 BPM | HEIGHT: 65 IN | TEMPERATURE: 98.4 F | DIASTOLIC BLOOD PRESSURE: 98 MMHG

## 2021-07-29 DIAGNOSIS — I50.9 ACUTE ON CHRONIC CONGESTIVE HEART FAILURE, UNSPECIFIED HEART FAILURE TYPE (HCC): Primary | ICD-10-CM

## 2021-07-29 LAB
ALBUMIN SERPL-MCNC: 3.9 G/DL (ref 3.4–5)
ALP BLD-CCNC: 117 U/L (ref 40–129)
ALT SERPL-CCNC: 19 U/L (ref 10–40)
ANION GAP SERPL CALCULATED.3IONS-SCNC: 9 MMOL/L (ref 3–16)
AST SERPL-CCNC: 22 U/L (ref 15–37)
BASOPHILS ABSOLUTE: 0 K/UL (ref 0–0.2)
BASOPHILS RELATIVE PERCENT: 0.8 %
BILIRUB SERPL-MCNC: <0.2 MG/DL (ref 0–1)
BILIRUBIN DIRECT: <0.2 MG/DL (ref 0–0.3)
BILIRUBIN URINE: NEGATIVE
BILIRUBIN, INDIRECT: NORMAL MG/DL (ref 0–1)
BLOOD, URINE: NEGATIVE
BUN BLDV-MCNC: 13 MG/DL (ref 7–20)
CALCIUM SERPL-MCNC: 9.3 MG/DL (ref 8.3–10.6)
CHLORIDE BLD-SCNC: 104 MMOL/L (ref 99–110)
CLARITY: CLEAR
CO2: 27 MMOL/L (ref 21–32)
COLOR: YELLOW
CREAT SERPL-MCNC: 1 MG/DL (ref 0.6–1.1)
EKG ATRIAL RATE: 65 BPM
EKG DIAGNOSIS: NORMAL
EKG P AXIS: 49 DEGREES
EKG P-R INTERVAL: 152 MS
EKG Q-T INTERVAL: 410 MS
EKG QRS DURATION: 82 MS
EKG QTC CALCULATION (BAZETT): 426 MS
EKG R AXIS: 25 DEGREES
EKG T AXIS: 31 DEGREES
EKG VENTRICULAR RATE: 65 BPM
EOSINOPHILS ABSOLUTE: 0.1 K/UL (ref 0–0.6)
EOSINOPHILS RELATIVE PERCENT: 2.4 %
GFR AFRICAN AMERICAN: >60
GFR NON-AFRICAN AMERICAN: 57
GLUCOSE BLD-MCNC: 117 MG/DL (ref 70–99)
GLUCOSE URINE: NEGATIVE MG/DL
HCT VFR BLD CALC: 37.3 % (ref 36–48)
HEMOGLOBIN: 12.7 G/DL (ref 12–16)
KETONES, URINE: NEGATIVE MG/DL
LEUKOCYTE ESTERASE, URINE: NEGATIVE
LYMPHOCYTES ABSOLUTE: 1.4 K/UL (ref 1–5.1)
LYMPHOCYTES RELATIVE PERCENT: 24.3 %
MAGNESIUM: 2.1 MG/DL (ref 1.8–2.4)
MCH RBC QN AUTO: 29.6 PG (ref 26–34)
MCHC RBC AUTO-ENTMCNC: 33.9 G/DL (ref 31–36)
MCV RBC AUTO: 87.3 FL (ref 80–100)
MICROSCOPIC EXAMINATION: NORMAL
MONOCYTES ABSOLUTE: 0.5 K/UL (ref 0–1.3)
MONOCYTES RELATIVE PERCENT: 9.5 %
NEUTROPHILS ABSOLUTE: 3.6 K/UL (ref 1.7–7.7)
NEUTROPHILS RELATIVE PERCENT: 63 %
NITRITE, URINE: NEGATIVE
PDW BLD-RTO: 14.5 % (ref 12.4–15.4)
PH UA: 7 (ref 5–8)
PLATELET # BLD: 240 K/UL (ref 135–450)
PMV BLD AUTO: 8.9 FL (ref 5–10.5)
POTASSIUM REFLEX MAGNESIUM: 3.8 MMOL/L (ref 3.5–5.1)
PRO-BNP: 194 PG/ML (ref 0–124)
PROTEIN UA: NEGATIVE MG/DL
RBC # BLD: 4.27 M/UL (ref 4–5.2)
SODIUM BLD-SCNC: 140 MMOL/L (ref 136–145)
SPECIFIC GRAVITY UA: 1.02 (ref 1–1.03)
TOTAL PROTEIN: 8.1 G/DL (ref 6.4–8.2)
TROPONIN: <0.01 NG/ML
URINE TYPE: NORMAL
UROBILINOGEN, URINE: 0.2 E.U./DL
WBC # BLD: 5.7 K/UL (ref 4–11)

## 2021-07-29 PROCEDURE — 99284 EMERGENCY DEPT VISIT MOD MDM: CPT

## 2021-07-29 PROCEDURE — 80076 HEPATIC FUNCTION PANEL: CPT

## 2021-07-29 PROCEDURE — 6360000002 HC RX W HCPCS: Performed by: PHYSICIAN ASSISTANT

## 2021-07-29 PROCEDURE — 71046 X-RAY EXAM CHEST 2 VIEWS: CPT

## 2021-07-29 PROCEDURE — 96374 THER/PROPH/DIAG INJ IV PUSH: CPT

## 2021-07-29 PROCEDURE — 93005 ELECTROCARDIOGRAM TRACING: CPT | Performed by: EMERGENCY MEDICINE

## 2021-07-29 PROCEDURE — 83735 ASSAY OF MAGNESIUM: CPT

## 2021-07-29 PROCEDURE — 81003 URINALYSIS AUTO W/O SCOPE: CPT

## 2021-07-29 PROCEDURE — 83880 ASSAY OF NATRIURETIC PEPTIDE: CPT

## 2021-07-29 PROCEDURE — 84484 ASSAY OF TROPONIN QUANT: CPT

## 2021-07-29 PROCEDURE — 36415 COLL VENOUS BLD VENIPUNCTURE: CPT

## 2021-07-29 PROCEDURE — 80048 BASIC METABOLIC PNL TOTAL CA: CPT

## 2021-07-29 PROCEDURE — 85025 COMPLETE CBC W/AUTO DIFF WBC: CPT

## 2021-07-29 RX ORDER — POTASSIUM CHLORIDE 750 MG/1
10 TABLET, EXTENDED RELEASE ORAL DAILY
Qty: 7 TABLET | Refills: 0 | Status: SHIPPED | OUTPATIENT
Start: 2021-07-29 | End: 2021-08-02

## 2021-07-29 RX ORDER — FUROSEMIDE 20 MG/1
20 TABLET ORAL DAILY
Qty: 7 TABLET | Refills: 0 | Status: SHIPPED | OUTPATIENT
Start: 2021-07-29 | End: 2021-08-02

## 2021-07-29 RX ORDER — FUROSEMIDE 10 MG/ML
40 INJECTION INTRAMUSCULAR; INTRAVENOUS ONCE
Status: COMPLETED | OUTPATIENT
Start: 2021-07-29 | End: 2021-07-29

## 2021-07-29 RX ADMIN — FUROSEMIDE 40 MG: 10 INJECTION, SOLUTION INTRAVENOUS at 20:24

## 2021-07-29 ASSESSMENT — ENCOUNTER SYMPTOMS
VOMITING: 0
SORE THROAT: 0
SINUS PRESSURE: 0
COLOR CHANGE: 0
EYE REDNESS: 0
ABDOMINAL PAIN: 0
WHEEZING: 0
EYE ITCHING: 0
NAUSEA: 0
BACK PAIN: 0
ABDOMINAL DISTENTION: 0
RHINORRHEA: 0
DIARRHEA: 0
CHEST TIGHTNESS: 0
EYE PAIN: 0
COUGH: 0
SHORTNESS OF BREATH: 0

## 2021-07-29 ASSESSMENT — PAIN DESCRIPTION - FREQUENCY: FREQUENCY: CONTINUOUS

## 2021-07-29 ASSESSMENT — PAIN DESCRIPTION - DESCRIPTORS: DESCRIPTORS: DISCOMFORT;TINGLING

## 2021-07-29 ASSESSMENT — PAIN - FUNCTIONAL ASSESSMENT: PAIN_FUNCTIONAL_ASSESSMENT: ACTIVITIES ARE NOT PREVENTED

## 2021-07-29 ASSESSMENT — PAIN DESCRIPTION - PROGRESSION: CLINICAL_PROGRESSION: NOT CHANGED

## 2021-07-29 ASSESSMENT — PAIN SCALES - GENERAL: PAINLEVEL_OUTOF10: 2

## 2021-07-29 ASSESSMENT — PAIN DESCRIPTION - LOCATION: LOCATION: FOOT

## 2021-07-29 ASSESSMENT — PAIN DESCRIPTION - ONSET: ONSET: ON-GOING

## 2021-07-29 ASSESSMENT — PAIN DESCRIPTION - PAIN TYPE: TYPE: ACUTE PAIN

## 2021-07-29 ASSESSMENT — PAIN DESCRIPTION - ORIENTATION: ORIENTATION: LEFT;RIGHT

## 2021-07-29 NOTE — ED PROVIDER NOTES
redness and itching. Respiratory: Negative for cough, chest tightness, shortness of breath and wheezing. Cardiovascular: Positive for palpitations and leg swelling. Negative for chest pain. Gastrointestinal: Negative for abdominal distention, abdominal pain, diarrhea, nausea and vomiting. Genitourinary: Negative for dysuria and hematuria. Musculoskeletal: Negative for arthralgias, back pain, gait problem, myalgias, neck pain and neck stiffness. Skin: Negative for color change, pallor and rash. Neurological: Negative for dizziness, seizures, syncope, weakness, light-headedness, numbness and headaches. Hematological: Does not bruise/bleed easily. Psychiatric/Behavioral: Negative for agitation, hallucinations, self-injury, sleep disturbance and suicidal ideas. The patient is not nervous/anxious. All other systems reviewed and are negative. Past Medical, Surgical, Family, and Social History     She has a past medical history of GERD (gastroesophageal reflux disease), Gestational diabetes, Hypertension, Morbid obesity due to excess calories ([address] Severe persistent asthma, Vitamin D deficiency, and Xerosis cutis. She has a past surgical history that includes  section (). Her family history is not on file. She reports that she has never smoked. She has never used smokeless tobacco. She reports that she does not drink alcohol and does not use drugs.     Medications     Discharge Medication List as of 2021 10:34 PM      CONTINUE these medications which have NOT CHANGED    Details   methocarbamol (ROBAXIN) 500 MG tablet Take 2 tablets by mouth 3 times daily for 10 days, Disp-60 tablet, R-0Print      naproxen (NAPROSYN) 500 MG tablet Take 1 tablet by mouth 2 times daily as needed for Pain, Disp-30 tablet, R-0Print      candesartan (ATACAND) 32 MG tablet Take 1 tablet by mouth daily, Disp-30 tablet, R-3PATIENT WOULD LIKE MEDICATION WITHOUT DIURETICNormal      pantoprazole (PROTONIX) 40 MG tablet Take 1 tablet by mouth every morning (before breakfast), Disp-90 tablet, R-1Normal      Gabapentin 10 % CREA Apply 1 cm topically 2 times daily as needed (For burning sensation on outside of right thigh), Disp-1 Tube, R-1Print      benzonatate (TESSALON) 200 MG capsule Take 1 capsule by mouth 3 times daily as needed for Cough, Disp-90 capsule, R-1Normal      azelastine (ASTELIN) 0.1 % nasal spray 1 spray by Nasal route 2 times daily Use in each nostril as directed, Disp-1 Bottle, R-3Normal      brompheniramine-pseudoephedrine-DM (BROMFED DM) 2-30-10 MG/5ML syrup Take 5 mLs by mouth 4 times daily as needed for Cough, Disp-473 mL, R-1Normal      albuterol sulfate  (90 Base) MCG/ACT inhaler Inhale 2 puffs into the lungs every 4 hours as needed for Wheezing or Shortness of Breath, Disp-1 Inhaler, R-5Normal      Bempedoic Acid (NEXLETOL) 180 MG TABS Take 180 mg by mouth daily Stop atorvastatin due to elevated CPK, Disp-30 tablet, R-5Normal      Caltrate 600+D Plus Minerals (CALTRATE) 600-800 MG-UNIT TABS tablet Take 1 tablet by mouth 2 times daily Okay to give generic equivalent and if not covered direct patient to buy OTC., Disp-60 tablet, R-11Normal      aspirin EC 81 MG EC tablet Take 1 tablet by mouth daily, Disp-90 tablet, R-1Normal      vitamin D3 (CHOLECALCIFEROL) 25 MCG (1000 UT) TABS tablet Take 2 tablets by mouth daily, Disp-60 tablet, R-11Normal      fluticasone-salmeterol (ADVAIR) 500-50 MCG/DOSE diskus inhaler Inhale 1 puff into the lungs every 12 hours, Disp-1 Inhaler, R-3Normal      montelukast (SINGULAIR) 10 MG tablet Take 1 tablet by mouth nightly, Disp-90 tablet, R-3Normal             Allergies     She is allergic to chloroquine, lisinopril, mefloquine, and quinolones. Physical Exam     INITIAL VITALS: BP: (!) 169/79, Temp: 98.6 °F (37 °C), Pulse: 69, Resp: 16, SpO2: 98 %  Physical Exam  Vitals and nursing note reviewed.    Constitutional:       General: She is not in acute distress. Appearance: She is well-developed. She is not diaphoretic. HENT:      Head: Normocephalic and atraumatic. Eyes:      General: No scleral icterus. Pupils: Pupils are equal, round, and reactive to light. Neck:      Vascular: No JVD. Cardiovascular:      Rate and Rhythm: Normal rate and regular rhythm. Pulmonary:      Effort: Pulmonary effort is normal. No tachypnea, accessory muscle usage or respiratory distress. Breath sounds: Normal breath sounds. No stridor or decreased air movement. No decreased breath sounds or rales. Abdominal:      Palpations: Abdomen is soft. Tenderness: There is no abdominal tenderness. Musculoskeletal:         General: Normal range of motion. Cervical back: Normal range of motion. Right lower le+ Pitting Edema present. Left lower le+ Pitting Edema present. Skin:     General: Skin is warm and dry. Findings: No rash. Neurological:      Mental Status: She is oriented to person, place, and time. Diagnostic Results     EKG   Interpreted in conjunction with emergency department physician Konstantin Juan MD  Rhythm: normal sinus   Rate: normal  Axis: normal  Ectopy: none  Conduction: normal  ST Segments: nonspecific changes in the high lateral leads, potentially from high lateral concentric LVH  T Waves: normal  Q Waves:none  Clinical Impression: Sinus rhythm, no ectopy, normal axis, normal QTC, no evidence of ischemia, injury or infarct  Comparison: Consistent with ECGs from earlier this year. RADIOLOGY:  XR CHEST (2 VW)   Final Result      Ill-defined bilateral airspace disease throughout the mid to lower lung zones. Infiltrate or edema can give this appearance. Correlate clinically.           LABS:   Results for orders placed or performed during the hospital encounter of    Basic Metabolic Panel w/ Reflex to MG   Result Value Ref Range    Sodium 140 136 - 145 mmol/L    Potassium reflex Magnesium 3.8 3.5 - 5.1 mmol/L    Chloride 104 99 - 110 mmol/L    CO2 27 21 - 32 mmol/L    Anion Gap 9 3 - 16    Glucose 117 (H) 70 - 99 mg/dL    BUN 13 7 - 20 mg/dL    CREATININE 1.0 0.6 - 1.1 mg/dL    GFR Non- 57 (A) >60    GFR African American >60 >60    Calcium 9.3 8.3 - 10.6 mg/dL   CBC auto differential   Result Value Ref Range    WBC 5.7 4.0 - 11.0 K/uL    RBC 4.27 4.00 - 5.20 M/uL    Hemoglobin 12.7 12.0 - 16.0 g/dL    Hematocrit 37.3 36.0 - 48.0 %    MCV 87.3 80.0 - 100.0 fL    MCH 29.6 26.0 - 34.0 pg    MCHC 33.9 31.0 - 36.0 g/dL    RDW 14.5 12.4 - 15.4 %    Platelets 380 172 - 062 K/uL    MPV 8.9 5.0 - 10.5 fL    Neutrophils % 63.0 %    Lymphocytes % 24.3 %    Monocytes % 9.5 %    Eosinophils % 2.4 %    Basophils % 0.8 %    Neutrophils Absolute 3.6 1.7 - 7.7 K/uL    Lymphocytes Absolute 1.4 1.0 - 5.1 K/uL    Monocytes Absolute 0.5 0.0 - 1.3 K/uL    Eosinophils Absolute 0.1 0.0 - 0.6 K/uL    Basophils Absolute 0.0 0.0 - 0.2 K/uL   Urinalysis, reflex to microscopic   Result Value Ref Range    Color, UA Yellow Straw/Yellow    Clarity, UA Clear Clear    Glucose, Ur Negative Negative mg/dL    Bilirubin Urine Negative Negative    Ketones, Urine Negative Negative mg/dL    Specific Gravity, UA 1.020 1.005 - 1.030    Blood, Urine Negative Negative    pH, UA 7.0 5.0 - 8.0    Protein, UA Negative Negative mg/dL    Urobilinogen, Urine 0.2 <2.0 E.U./dL    Nitrite, Urine Negative Negative    Leukocyte Esterase, Urine Negative Negative    Microscopic Examination Not Indicated     Urine Type Voided    Brain Natriuretic Peptide   Result Value Ref Range    Pro- (H) 0 - 124 pg/mL   Hepatic function panel (LFTs)   Result Value Ref Range    Total Protein 8.1 6.4 - 8.2 g/dL    Albumin 3.9 3.4 - 5.0 g/dL    Alkaline Phosphatase 117 40 - 129 U/L    ALT 19 10 - 40 U/L    AST 22 15 - 37 U/L    Total Bilirubin <0.2 0.0 - 1.0 mg/dL    Bilirubin, Direct <0.2 0.0 - 0.3 mg/dL    Bilirubin, Indirect see below 0.0 - 1.0 mg/dL   Magnesium   Result Value Ref Range    Magnesium 2.10 1.80 - 2.40 mg/dL   Troponin   Result Value Ref Range    Troponin <0.01 <0.01 ng/mL   EKG 12 Lead   Result Value Ref Range    Ventricular Rate 65 BPM    Atrial Rate 65 BPM    P-R Interval 152 ms    QRS Duration 82 ms    Q-T Interval 410 ms    QTc Calculation (Bazett) 426 ms    P Axis 49 degrees    R Axis 25 degrees    T Axis 31 degrees    Diagnosis       EKG performed in ER and to be interpreted by ER physician. Confirmed by MD, ER ([id number]),  [address], [address] [place] [address] ([id number]) on 7/29/2021 7:02:44 PM       ED BEDSIDE ULTRASOUND:  N/A    RECENT VITALS:  BP: (!) 188/98, Temp: 98.4 °F (36.9 °C), Pulse: 61, Resp: 14, SpO2: 95 %     Procedures     N/A    ED Course     Nursing Notes, Past Medical Hx,Past Surgical Hx, Social Hx, Allergies, and Family Hx were reviewed. The patient was given the following medications:  Orders Placed This Encounter   Medications    furosemide (LASIX) injection 40 mg    furosemide (LASIX) 20 MG tablet     Sig: Take 1 tablet by mouth daily for 7 days     Dispense:  7 tablet     Refill:  0    potassium chloride (KLOR-CON M) 10 MEQ extended release tablet     Sig: Take 1 tablet by mouth daily for 7 days     Dispense:  7 tablet     Refill:  0       CONSULTS:  None    MEDICAL DECISION MAKING / ASSESSMENT / Edshlomoandrez Lomas is admitted to the Emergency Department for evaluation of her chief complaint as described in the history of present illness. Complete history and physical was performed by me and my attending. Nursing notes, past medical history, surgical history, family history and social history were reviewed and addressed in the HPI. Hina Crockett is a 64 y.o. female who presents to the emergency department with a complaint of foot pain and bilateral foot and ankle swelling.   What brought the patient into the emergency department was that she struck her right toes on the inside of her desk at work, causing a \"pins-and-needles\" sensation in her foot. She also noted that she has had some increasing swelling of the ankles and feet over the past few days. She has had swelling of her lower extremities in the past and is previously been on diuretics, but has not been on these medications, or experience the edema in several months. She denies any difficulty breathing, dyspnea on exertion, orthopnea, paroxysmal nocturnal dyspnea, but notes that she feels slightly more fatigued than usual.  She was concerned about the swelling as well as the odd \"numbness\" sensation in her toes, prompting her to come to the emergency department for evaluation. On emergency department arrival, the patient is hemodynamically stable and within normal limits except for slight hypertension with a blood pressure of 169/79. On physical examination, the patient demonstrates bilaterally clear breath sounds to auscultation, no tachycardia but does demonstrate 1+ bilateral pitting edema of the midfoot, ankle and distal pretibial region. The shoes that she has on leaves deep impressions in her feet and on removing the shoes, the patient reports the pins and needle sensation she was feeling in her toes seems to improve. There is no evidence of hyperpigmentation, drying of the skin or other signs and symptoms concerning for long-term edema of the lower extremities or significant peripheral artery disease. On diagnostic evaluation, the patient's EKG demonstrates some nonspecific ST-T changes in leads I and aVL which may be consistent with developing or concentric high lateral left ventricular hypertrophy though the EKG is not diagnostic for LVH. Review of her previous echocardiogram demonstrates some left ventricular hypertrophy which clinically correlates. Her initial troponin is negative. Her CBC demonstrates no leukocytosis, anemia or thrombocytopenia.   Her basic metabolic panel demonstrates no significant abnormalities including no electrolyte abnormalities, preserved renal function, normal anion gap and no alterations in her bicarbonate level. Her urinalysis demonstrates no proteinuria, though it is on the upper end of normal for specific gravity which may suggest some slight dehydration. She does demonstrate a slight elevation in her BNP, however do not have a prior BNP level to compare it to and it is only elevated to 194 which is below the age independent cutoff point concerning for acute heart failure. Her liver function testing is unremarkable and coupled with the absence of proteinuria, is reassuring against nephrotic syndrome. Her magnesium level is also normal.  The patient's chest x-ray however, demonstrates an ill-defined bilateral airspace disease throughout the mid to lower lung zones which appears to be most likely edema. She has not been experiencing infectious symptoms, so this seems the most appropriate clinical correlation. It appears that her signs and symptoms, pulmonary edema and peripheral edema, are most consistent with mild CHF exacerbation and fluid overload. The patient is not currently on a diuretic and had requested of her primary care physician in the past to discontinue diuretic therapy as it was causing her to have to urinate too frequently through the day. Today, however I feel the patient would benefit from diuresis and I administered 40 mg of Lasix in the emergency department. The patient was able to urinate several times prior to discharge from the emergency department. Her prior echocardiogram from March of this year demonstrates hyperdynamic systolic function with an EF of 65%, mild concentric LVH and no regional wall abnormalities or diastolic dysfunction. Therefore, I do not feel that she is experiencing decompensated CHF at this time.   I feel the best course of action is to start the patient on a short course of diuretics, Lasix 20 mg orally once daily supplemented with potassium

## 2021-07-29 NOTE — ED TRIAGE NOTES
Pt came into in the ED with c/o heart palpitations and bilateral swelling of the lower legs around the ankles. Hx of HTN.

## 2021-07-29 NOTE — TELEPHONE ENCOUNTER
bilateral feet and ankle swelling, right big toe tingling    Triage indicates for patient to see HCP within 4 hours. Pt instructed to go to [facility] if no appointments today. Care advice provided, patient verbalizes understanding; denies any other questions or concerns; instructed to call back for any new or worsening symptoms. Writer provided warm transfer to Emile Raygoza at [facility] for appointment scheduling. Attention Provider: Thank you for allowing me to participate in the care of your patient. The patient was connected to triage in response to information provided to the ECC. Please do not respond through this encounter as the response is not directed to a shared pool.

## 2021-07-30 NOTE — ED PROVIDER NOTES
ED Attending Attestation Note     Date of evaluation: 7/29/2021    This patient was seen by the advance practice provider. I have seen and examined the patient, agree with the workup, evaluation, management and diagnosis. The care plan has been discussed. I have reviewed the ECG and concur with the VANESSA's interpretation. My assessment reveals adult female with some nontender edema in bilateral ankles and feet as well as in the anterior tibia, symmetric and nonpainful with compression of the area. Lungs are generally clear no oxygen requirement or increased work of breathing on my examination. Baron Jeanette MD  07/29/21 2033

## 2021-08-02 ENCOUNTER — OFFICE VISIT (OUTPATIENT)
Dept: PRIMARY CARE CLINIC | Age: 61
End: 2021-08-02
Payer: COMMERCIAL

## 2021-08-02 VITALS
DIASTOLIC BLOOD PRESSURE: 88 MMHG | SYSTOLIC BLOOD PRESSURE: 160 MMHG | HEART RATE: 75 BPM | WEIGHT: 249 LBS | TEMPERATURE: 98.4 F | HEIGHT: 64 IN | BODY MASS INDEX: 42.51 KG/M2

## 2021-08-02 DIAGNOSIS — K21.9 GASTROESOPHAGEAL REFLUX DISEASE WITHOUT ESOPHAGITIS: ICD-10-CM

## 2021-08-02 DIAGNOSIS — R60.0 LOCALIZED EDEMA: ICD-10-CM

## 2021-08-02 DIAGNOSIS — I10 ESSENTIAL HYPERTENSION: ICD-10-CM

## 2021-08-02 DIAGNOSIS — R63.8 WEIGHT DISORDER: ICD-10-CM

## 2021-08-02 PROCEDURE — 99214 OFFICE O/P EST MOD 30 MIN: CPT | Performed by: INTERNAL MEDICINE

## 2021-08-02 RX ORDER — PANTOPRAZOLE SODIUM 40 MG/1
40 TABLET, DELAYED RELEASE ORAL
Qty: 90 TABLET | Refills: 1 | Status: SHIPPED | OUTPATIENT
Start: 2021-08-02 | End: 2021-12-17 | Stop reason: SDUPTHER

## 2021-08-02 RX ORDER — HYDROCHLOROTHIAZIDE 25 MG/1
25 TABLET ORAL EVERY MORNING
Qty: 90 TABLET | Refills: 5 | Status: SHIPPED | OUTPATIENT
Start: 2021-08-02 | End: 2021-12-17 | Stop reason: SDUPTHER

## 2021-08-02 RX ORDER — CANDESARTAN 32 MG/1
32 TABLET ORAL DAILY
Qty: 90 TABLET | Refills: 3 | Status: SHIPPED | OUTPATIENT
Start: 2021-08-02 | End: 2021-12-17 | Stop reason: SDUPTHER

## 2021-08-02 SDOH — ECONOMIC STABILITY: FOOD INSECURITY: WITHIN THE PAST 12 MONTHS, THE FOOD YOU BOUGHT JUST DIDN'T LAST AND YOU DIDN'T HAVE MONEY TO GET MORE.: NEVER TRUE

## 2021-08-02 SDOH — ECONOMIC STABILITY: FOOD INSECURITY: WITHIN THE PAST 12 MONTHS, YOU WORRIED THAT YOUR FOOD WOULD RUN OUT BEFORE YOU GOT MONEY TO BUY MORE.: NEVER TRUE

## 2021-08-02 ASSESSMENT — ENCOUNTER SYMPTOMS
SHORTNESS OF BREATH: 0
VOMITING: 0
WHEEZING: 0
WHEEZING: 0
SHORTNESS OF BREATH: 0
DIARRHEA: 0
GASTROINTESTINAL NEGATIVE: 1
ABDOMINAL PAIN: 0
EYES NEGATIVE: 1
CHEST TIGHTNESS: 0
NAUSEA: 0
COUGH: 0
ABDOMINAL PAIN: 0
SORE THROAT: 0
DIARRHEA: 0
ABDOMINAL DISTENTION: 0
CONSTIPATION: 0
COUGH: 0
BLOOD IN STOOL: 0

## 2021-08-02 ASSESSMENT — SOCIAL DETERMINANTS OF HEALTH (SDOH): HOW HARD IS IT FOR YOU TO PAY FOR THE VERY BASICS LIKE FOOD, HOUSING, MEDICAL CARE, AND HEATING?: NOT HARD AT ALL

## 2021-08-02 NOTE — PROGRESS NOTES
Subjective:      Patient ID: Kate Bah is a 63 y.o. female. 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     Her prior echocardiogram from 3/2021 shows  demonstrates hyperdynamic systolic function with an EF of 65%, mild concentric LVH and no regional wall abnormalities or diastolic dysfunction. Last seen  VV 3/26/2021 Patient presents with:  Results: discuss results from 12/2020  Other: cramps in legs off and on merissa after lying on leather couch ? ?               4/30/2020 Patient presents with:  Lower Back Pain: just started a few hrs ago now feels spasms and extreme pain   Knee Pain: better with Celebrex . Also got a shot from Ortho a few hrs ago                 By VV4/22/2020 Patient presents with:  Pain: rt knee and thigh > 6 mths . Getting unbearable now     Has seen Ortho . nsaids not helping . Tried PT but no relief               12/30/2019   Dr Alex Thomas Patient presents with: Annual Exam. Needs for Work              Review of Systems   Constitutional: Negative for activity change, appetite change, fatigue, fever and unexpected weight change. Tdap 2015     Flu Vac 12/19     Pneum Vac     Shingrex/ script  12/30/19    Covid Vac 2/21    HENT: Negative. Dental ex reg    Eyes: Negative. Negative for visual disturbance. Last Eye exam  12/19   Respiratory: Negative for cough, chest tightness, shortness of breath and wheezing. Does not smoke . Rare Etoh     Asthma  >>   Cardiovascular: Positive for leg swelling. HTN  2009     No known CAD . No FH either     Fairly active    Gastrointestinal: Negative. Negative for abdominal distention, abdominal pain, blood in stool, constipation and diarrhea.         No FH of Ca colon     Colonoscopy 2018    Endocrine:        No FH of diabetes    Genitourinary: Negative for dysuria, frequency, menstrual problem, urgency and vaginal discharge. Natural menopause 2017     Last pelvic / pap 2017     One 12 yr old  . Gestational Diabetes     Mammogram 3/21   Musculoskeletal:        H/O Torn Meniscus rt    Allergic/Immunologic: Positive for environmental allergies. Negative for food allergies. Neurological: Negative for dizziness, weakness and headaches. Psychiatric/Behavioral: Negative for behavioral problems, dysphoric mood and sleep disturbance. The patient is not nervous/anxious. Objective:   Physical Exam  Constitutional:       General: She is not in acute distress. Appearance: She is obese. Eyes:      Extraocular Movements: Extraocular movements intact. Cardiovascular:      Rate and Rhythm: Regular rhythm. Heart sounds: Normal heart sounds. Pulmonary:      Effort: Pulmonary effort is normal.      Breath sounds: No wheezing or rales. Abdominal:      General: There is distension. Musculoskeletal:      Right lower leg: Edema present. Left lower leg: Edema present. Neurological:      Mental Status: She is oriented to person, place, and time. Psychiatric:         Mood and Affect: Mood normal.         Behavior: Behavior normal.         Assessment:     Iris was seen today for follow-up from [facility] records and Recent Echo . Diagnoses and all orders for this visit:    Weight disorder  BMI > 42! Advised low carb diet + exercise reg     Essential hypertension  BP b/e Atacand was Dcd in ER ? ? Restart Atacand + Hctz . Stop Lasix   -     candesartan (ATACAND) 32 MG tablet; Take 1 tablet by mouth daily  -     hydroCHLOROthiazide (HYDRODIURIL) 25 MG tablet;  Take 1 tablet by mouth every morning    Localized edema  Positional . Advised low salt diet ; Elevate legs when sitting     Gastroesophageal reflux disease without esophagitis  -     pantoprazole (PROTONIX) 40 MG tablet;  Take 1 tablet by mouth every morning (before breakfast)            Plan:           Lori Clement MD

## 2021-08-02 NOTE — ED PROVIDER NOTES
[address] [facility] ENCOUNTER          PHYSICIAN ASSISTANT NOTE       Date of evaluation: 7/20/2021    Chief Complaint     Arm Pain      History of Present Illness     Юлия Barahona is a 63 y.o. female with a past medical history as noted below who presents to the Emergency Department with a complaint of right arm pain. The patient reports that yesterday, she inadvertently hit her right arm against a doorknob, and has been experiencing upper arm pain and shoulder pain which radiates towards the right side of her neck and into her right hand. The patient reports that she does not see any visible bruising and has no decreased function of the right arm or right hand. The pain is reported as the upper portion of her upper arm, and not within the shoulder girdle, but she does have pain along the ridge of the shoulder on the right side. She denies any numbness or tingling of the upper extremity, but does report tightness along the ridge of the neck. She is concerned that she may have broken her arm. Pain is reported as a 9 out of 10 on the pain scale, and described as a tight, throbbing, burning pain radiating from the ridge of her shoulder to her right hand. No prior surgeries to the shoulder or arm. She has not taken anything for the pain. Denies any fevers, chills, sweats or other constitutional symptoms. Review of Systems     Review of Systems   Constitutional: Negative for chills, diaphoresis and fever. HENT: Negative for congestion and sore throat. Respiratory: Negative for cough, shortness of breath and wheezing. Cardiovascular: Negative for chest pain, palpitations and leg swelling. Gastrointestinal: Negative for abdominal pain, diarrhea, nausea and vomiting. Genitourinary: Negative for dysuria. Musculoskeletal: Negative for myalgias. Right shoulder and arm pain   Skin: Negative for rash.    Neurological: Negative for dizziness, seizures, weakness and headaches. Hematological: Does not bruise/bleed easily. Psychiatric/Behavioral: Negative for hallucinations and suicidal ideas. All other systems reviewed and are negative. Past Medical, Surgical, Family, and Social History     She has a past medical history of GERD (gastroesophageal reflux disease), Gestational diabetes, Hypertension, Morbid obesity due to excess calories ([address] Severe persistent asthma, Vitamin D deficiency, and Xerosis cutis. She has a past surgical history that includes  section (). Her family history is not on file. She reports that she has never smoked. She has never used smokeless tobacco. She reports that she does not drink alcohol and does not use drugs.     Medications     Discharge Medication List as of 2021 10:01 PM      CONTINUE these medications which have NOT CHANGED    Details   candesartan (ATACAND) 32 MG tablet Take 1 tablet by mouth daily, Disp-30 tablet, R-3PATIENT WOULD LIKE MEDICATION WITHOUT DIURETICNormal      candesartan cilexetil-HCTZ (ATACAND HCT) 32-25 MG TABS Take 1 tablet by mouth daily, Disp-30 tablet, R-0Normal      pantoprazole (PROTONIX) 40 MG tablet Take 1 tablet by mouth every morning (before breakfast), Disp-90 tablet, R-1Normal      Gabapentin 10 % CREA Apply 1 cm topically 2 times daily as needed (For burning sensation on outside of right thigh), Disp-1 Tube, R-1Print      benzonatate (TESSALON) 200 MG capsule Take 1 capsule by mouth 3 times daily as needed for Cough, Disp-90 capsule, R-1Normal      azelastine (ASTELIN) 0.1 % nasal spray 1 spray by Nasal route 2 times daily Use in each nostril as directed, Disp-1 Bottle, R-3Normal      brompheniramine-pseudoephedrine-DM (BROMFED DM) 2-30-10 MG/5ML syrup Take 5 mLs by mouth 4 times daily as needed for Cough, Disp-473 mL, R-1Normal      albuterol sulfate  (90 Base) MCG/ACT inhaler Inhale 2 puffs into the lungs every 4 hours as needed for Wheezing or Shortness of Breath, Disp-1 Inhaler, R-5Normal      Bempedoic Acid (NEXLETOL) 180 MG TABS Take 180 mg by mouth daily Stop atorvastatin due to elevated CPK, Disp-30 tablet, R-5Normal      Caltrate 600+D Plus Minerals (CALTRATE) 600-800 MG-UNIT TABS tablet Take 1 tablet by mouth 2 times daily Okay to give generic equivalent and if not covered direct patient to buy OTC., Disp-60 tablet, R-11Normal      aspirin EC 81 MG EC tablet Take 1 tablet by mouth daily, Disp-90 tablet, R-1Normal      vitamin D3 (CHOLECALCIFEROL) 25 MCG (1000 UT) TABS tablet Take 2 tablets by mouth daily, Disp-60 tablet, R-11Normal      fluticasone-salmeterol (ADVAIR) 500-50 MCG/DOSE diskus inhaler Inhale 1 puff into the lungs every 12 hours, Disp-1 Inhaler, R-3Normal      montelukast (SINGULAIR) 10 MG tablet Take 1 tablet by mouth nightly, Disp-90 tablet, R-3Normal             Allergies     She is allergic to chloroquine, lisinopril, mefloquine, and quinolones. Physical Exam     INITIAL VITALS: BP: (!) 182/93, Temp: 99.1 °F (37.3 °C), Pulse: 70, Resp: 16, SpO2: 100 %  Physical Exam  Vitals and nursing note reviewed. Constitutional:       General: She is not in acute distress. Appearance: She is well-developed. She is not diaphoretic. HENT:      Head: Normocephalic and atraumatic. Eyes:      General: No scleral icterus. Pupils: Pupils are equal, round, and reactive to light. Neck:      Vascular: No JVD. Cardiovascular:      Rate and Rhythm: Normal rate and regular rhythm. Pulmonary:      Effort: Pulmonary effort is normal. No respiratory distress. Breath sounds: Normal breath sounds. No stridor. Abdominal:      Palpations: Abdomen is soft. Tenderness: There is no abdominal tenderness. Musculoskeletal:         General: Normal range of motion. Right shoulder: Tenderness present. No swelling, deformity, effusion, bony tenderness or crepitus. Normal range of motion. Normal strength. Normal pulse.       Left shoulder: Normal.      Right upper arm: Tenderness and bony tenderness present. No swelling or edema. Left upper arm: Normal.      Cervical back: Normal range of motion. Comments: The patient demonstrates tenderness along the right trapezius muscle and right deltoid with some pain radiating towards the patient's right hand. Skin:     General: Skin is warm and dry. Findings: No rash. Neurological:      Mental Status: She is oriented to person, place, and time. Diagnostic Results     RADIOLOGY:  XR HUMERUS RIGHT (MIN 2 VIEWS)   Final Result   Impression:    No acute osseous injury. LABS:   No results found for this visit on 07/20/21. ED BEDSIDE ULTRASOUND:  N/A    RECENT VITALS:  BP: (!) 182/93, Temp: 99.1 °F (37.3 °C), Pulse: 70, Resp: 16, SpO2: 100 %     Procedures     N/A    ED Course     Nursing Notes, Past Medical Hx,Past Surgical Hx, Social Hx, Allergies, and Family Hx were reviewed. The patient was given the following medications:  Orders Placed This Encounter   Medications    ketorolac (TORADOL) injection 15 mg    methocarbamol (ROBAXIN) tablet 1,000 mg    methocarbamol (ROBAXIN) 500 MG tablet     Sig: Take 2 tablets by mouth 3 times daily for 10 days     Dispense:  60 tablet     Refill:  0    naproxen (NAPROSYN) 500 MG tablet     Sig: Take 1 tablet by mouth 2 times daily as needed for Pain     Dispense:  30 tablet     Refill:  0       CONSULTS:  None    MEDICAL DECISION MAKING / ASSESSMENT / Hermes Luiza is admitted to the Emergency Department for evaluation of her chief complaint as described in the history of present illness. Complete history and physical was performed by me and my attending. Nursing notes, past medical history, surgical history, family history and social history were reviewed and addressed in the HPI. Aureliano Tomlin is a 63 y.o. female who presents to the emergency department with a complaint of right arm and shoulder pain.   The patient reports that she struck her right arm against a doorknob yesterday, and since that time, she has been experiencing pain along the ridge of her shoulder, her right upper forearm with radiation towards her hand. She has not taken anything for the pain, but reports that it has not gotten any better in the last 24 hours prompting her visit to the emergency department. On presentation to the emergency department, the patient is hemodynamically stable with hypertension noted. On physical examination, the patient demonstrates tenderness along the right trapezius and deltoid muscles without evidence of ecchymosis or erythema. There is no crepitus or deformity. She reports tenderness along the right humerus. X-rays performed of this region demonstrate no osseous or articular abnormality. The shoulder demonstrates appropriate range of motion without deficits in examination. The patient was given Robaxin and Toradol in the emergency department with improvement in her symptoms. She will be prescribed methocarbamol and naproxen and will follow up with her primary care physician. I discussed this plan at length the patient who verbalizes understanding and is in agreement. The patient is currently stable and will be discharged home for continued self-care. Please see patient's AVS for additional discharge instructions. Clinical Impression     1. Arm contusion, right, initial encounter    2.  Trapezius strain, right, initial encounter        Disposition     PATIENT REFERRED TO:  Bart Carcamo MD  [facility]  [phone]    Schedule an appointment as soon as possible for a visit   If symtpoms are not improving on the prescribed regimen    [facility] Emergency Department  [address] [place] [id number]  [phone]  Go to   If symptoms worsen      DISCHARGE MEDICATIONS:  Discharge Medication List as of 7/20/2021 10:01 PM      START taking these medications    Details   methocarbamol (ROBAXIN) 500 MG tablet Take 2 tablets by mouth 3 times daily for 10 days, Disp-60 tablet, R-0Print      naproxen (NAPROSYN) 500 MG tablet Take 1 tablet by mouth 2 times daily as needed for Pain, Disp-30 tablet, R-0Print             DISPOSITION Decision To Discharge 07/20/2021 09:27:01 PM     [address] TANIA Davidson  08/02/21 1144

## 2021-08-05 ENCOUNTER — TELEPHONE (OUTPATIENT)
Dept: PULMONOLOGY | Age: 61
End: 2021-08-05

## 2021-08-05 NOTE — TELEPHONE ENCOUNTER
Several attempts to contact patient to schedule appt for patient with Dr. Melita Arceo since Dr. Siri Ferrara departure.   No answer and no VM on cell #

## 2021-08-06 NOTE — TELEPHONE ENCOUNTER
Attempted to contact patient, mailbox full. 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

## 2021-08-10 NOTE — TELEPHONE ENCOUNTER
Phone call to patient regarding an appt and was advised she is now follow with a GI doctor and doesn't want to make an appt at this time. She will let us know if she needs us in the future.

## 2021-09-30 ENCOUNTER — HOSPITAL ENCOUNTER (EMERGENCY)
Age: 61
Discharge: HOME | End: 2021-09-30
Attending: EMERGENCY MEDICINE
Payer: COMMERCIAL

## 2021-09-30 VITALS
SYSTOLIC BLOOD PRESSURE: 155 MMHG | DIASTOLIC BLOOD PRESSURE: 95 MMHG | OXYGEN SATURATION: 100 % | TEMPERATURE: 98.1 F | HEIGHT: 65 IN | RESPIRATION RATE: 16 BRPM | HEART RATE: 75 BPM | WEIGHT: 254 LBS | BODY MASS INDEX: 42.32 KG/M2

## 2021-09-30 DIAGNOSIS — H57.12 PAIN AROUND LEFT EYE: Primary | ICD-10-CM

## 2021-09-30 PROCEDURE — 99283 EMERGENCY DEPT VISIT LOW MDM: CPT

## 2021-09-30 PROCEDURE — 6370000000 HC RX 637 (ALT 250 FOR IP): Performed by: PHYSICIAN ASSISTANT

## 2021-09-30 RX ORDER — TETRACAINE HYDROCHLORIDE 5 MG/ML
1 SOLUTION OPHTHALMIC ONCE
Status: COMPLETED | OUTPATIENT
Start: 2021-09-30 | End: 2021-09-30

## 2021-09-30 RX ORDER — TOBRAMYCIN 0.3 %
0.5 OINTMENT (GRAM) OPHTHALMIC (EYE) 2 TIMES DAILY
Qty: 3.5 G | Refills: 0 | Status: SHIPPED | OUTPATIENT
Start: 2021-09-30 | End: 2021-10-05

## 2021-09-30 RX ORDER — DIPHENHYDRAMINE HCL 25 MG
1 CAPSULE ORAL 4 TIMES DAILY
Qty: 15 ML | Refills: 0 | Status: SHIPPED | OUTPATIENT
Start: 2021-09-30 | End: 2021-12-12

## 2021-09-30 RX ADMIN — FLUORESCEIN SODIUM 1 MG: 1 STRIP OPHTHALMIC at 21:46

## 2021-09-30 RX ADMIN — TETRACAINE HYDROCHLORIDE 1 DROP: 5 SOLUTION OPHTHALMIC at 21:46

## 2021-09-30 ASSESSMENT — ENCOUNTER SYMPTOMS
PHOTOPHOBIA: 1
NAUSEA: 0
WHEEZING: 0
EYE PAIN: 1
SHORTNESS OF BREATH: 0
EYE DISCHARGE: 1
BACK PAIN: 0
ABDOMINAL PAIN: 0
COUGH: 0
VOMITING: 0

## 2021-09-30 ASSESSMENT — PAIN DESCRIPTION - DESCRIPTORS: DESCRIPTORS: DISCOMFORT;TENDER

## 2021-09-30 ASSESSMENT — PAIN DESCRIPTION - LOCATION: LOCATION: EYE

## 2021-09-30 ASSESSMENT — PAIN SCALES - GENERAL: PAINLEVEL_OUTOF10: 7

## 2021-09-30 ASSESSMENT — PAIN DESCRIPTION - FREQUENCY: FREQUENCY: CONTINUOUS

## 2021-09-30 ASSESSMENT — PAIN DESCRIPTION - PAIN TYPE: TYPE: ACUTE PAIN

## 2021-09-30 ASSESSMENT — PAIN DESCRIPTION - ORIENTATION: ORIENTATION: LEFT

## 2021-09-30 NOTE — ED PROVIDER NOTES
[address] ENCOUNTER          PHYSICIAN ASSISTANT NOTE       Date of evaluation: 9/30/2021    Chief Complaint     Eye Drainage (Left eye pain and draining since getting splashed in eye with chili Tuesday. No redness or swelling, sclera normal - no redness. No vision problems. Pt reports pain with eye movements) and Eye Pain    History of Present Illness     Юлия Barahona is a 63 y.o. female who presents to the emergency department with left eye pain. Patient states that she was eating some chili soup 2 days ago when it splashed up into her left eye. Afterwards, she tried to rinse her eye with water and it did not resolve. States that she feels like there is something still stuck in her eye. Since the event occurred she states that she has had constant pain that feels burning in nature. She states that closing her eyes makes the pain improve however moving her eyes left to right irritates her pain. She currently rates her pain a 7/10. She endorses clear drainage coming from the left eye and photophobia. She denies any changes in vision. She denies dizziness, headaches, fever, chills. She denies any history of eye problems or surgeries. She denies use of contact lenses. Review of Systems     Review of Systems   Constitutional: Negative for chills and fatigue. HENT: Negative for ear discharge. Eyes: Positive for photophobia, pain and discharge. Respiratory: Negative for cough, shortness of breath and wheezing. Cardiovascular: Negative for chest pain. Gastrointestinal: Negative for abdominal pain, nausea and vomiting. Endocrine: Negative for cold intolerance. Genitourinary: Negative for flank pain. Musculoskeletal: Negative for back pain and myalgias. Neurological: Negative for seizures, syncope and headaches. Hematological: Negative for adenopathy. Psychiatric/Behavioral: Negative for agitation.  The patient is not nervous/anxious and is not hyperactive. Past Medical, Surgical, Family, and Social History     She has a past medical history of GERD (gastroesophageal reflux disease), Gestational diabetes, Hypertension, Morbid obesity due to excess calories ([address] Severe persistent asthma, Vitamin D deficiency, and Xerosis cutis. She has a past surgical history that includes  section (). Her family history is not on file. She reports that she has never smoked. She has never used smokeless tobacco. She reports that she does not drink alcohol and does not use drugs. Medications     Previous Medications    CALTRATE 600+D PLUS MINERALS (CALTRATE) 600-800 MG-UNIT TABS TABLET    Take 1 tablet by mouth 2 times daily Okay to give generic equivalent and if not covered direct patient to buy OTC. CANDESARTAN (ATACAND) 32 MG TABLET    Take 1 tablet by mouth daily    HYDROCHLOROTHIAZIDE (HYDRODIURIL) 25 MG TABLET    Take 1 tablet by mouth every morning    PANTOPRAZOLE (PROTONIX) 40 MG TABLET    Take 1 tablet by mouth every morning (before breakfast)    VITAMIN D3 (CHOLECALCIFEROL) 25 MCG (1000 UT) TABS TABLET    Take 2 tablets by mouth daily       Allergies     She is allergic to chloroquine, lisinopril, mefloquine, and quinolones. Physical Exam     INITIAL VITALS: BP: (!) 155/95, Temp: 98.1 °F (36.7 °C), Pulse: 75, Resp: 16, SpO2: 100 %  Physical Exam  Vitals and nursing note reviewed. Constitutional:       Appearance: Normal appearance. HENT:      Head: Normocephalic and atraumatic. Nose: Nose normal.      Mouth/Throat:      Mouth: Mucous membranes are moist.   Eyes:      Extraocular Movements: Extraocular movements intact. Conjunctiva/sclera: Conjunctivae normal.      Pupils: Pupils are equal, round, and reactive to light. Comments: There is pain with lateral motion of the left. No injection or chemosis present. Cardiovascular:      Rate and Rhythm: Normal rate and regular rhythm. Pulses: Normal pulses. Pulmonary:      Effort: Pulmonary effort is normal.      Breath sounds: Normal breath sounds. Abdominal:      General: Abdomen is flat. Palpations: Abdomen is soft. Musculoskeletal:         General: Normal range of motion. Cervical back: Normal range of motion and neck supple. Skin:     General: Skin is warm and dry. Capillary Refill: Capillary refill takes less than 2 seconds. Neurological:      General: No focal deficit present. Mental Status: She is alert and oriented to person, place, and time. Psychiatric:         Mood and Affect: Mood normal.         Behavior: Behavior normal.       Diagnostic Results     RADIOLOGY:  No orders to display     LABS:   No results found for this visit on 09/30/21. RECENT VITALS:  BP: (!) 155/95, Temp: 98.1 °F (36.7 °C), Pulse: 75, Resp: 16, SpO2: 100 %     ED Course     Nursing Notes, Past Medical Hx,Past Surgical Hx, Social Hx, Allergies, and Family Hx were reviewed. The patient was given the following medications:  Orders Placed This Encounter   Medications    tetracaine (TETRAVISC) 0.5 % ophthalmic solution 1 drop    fluorescein ophthalmic strip 1 mg    dextran 70-hypromellose (ARTIFICIAL TEARS) 0.1-0.3 % SOLN opthalmic solution     Sig: Place 1 drop into the left eye 4 times daily     Dispense:  15 mL     Refill:  0    tobramycin (TOBREX) 0.3 % ophthalmic ointment     Sig: Place 0.5 inches into the left eye 2 times daily for 5 days     Dispense:  3.5 g     Refill:  0     CONSULTS:  None    MEDICAL DECISION MAKING / ASSESSMENT / Pennelope Lois is a 63 y.o. female who presents with eye pain and drainage. Physical examination revealed a left eye without conjunctival erythema or drainage. Bilateral visual acuity 20/20 in both eyes. The lids were flipped and swept and no foreign bodies were visualized. Work-up today consisted of pH testing which was normal at 7.  Slit lamp exam after tetracaine and fluorescein administration showed no fluorescein uptake. Pupils are equal and reactive bilaterally. Patient was given prescriptions for tobramycin opthalmic ointment and artificial tears for symptomatic relief. At this time I have low suspicion for conjunctivitis giving lack of itching purulent discharge or sick contacts. I also have low suspicion for periorbital cellulitis given minimal swelling of the eyelid and no tenderness to palpation of the periorbital structures. I have low suspicion for retrobulbar infection as the patient does not exhibit proptosis extraocular movements are intact without pain. Low suspicion for acute glaucoma due to lack of pain redness and IOP. The patient likely has left eye irritation due to exposure of soup to the eye, comforted by the fact that the tetracaine relieved the patient's pain in the emergency department. Patient's pain with EOM is relieved. I do not believe that this patient requires any further work-up or inpatient management for their complaints, and are appropriate for outpatient follow-up. I discussed the findings of my physical exam and work-up with the patient, and they were given the opportunity to ask questions. The patient is agreeable with the plan and is ready to be discharged. Patient instructed to follow-up with ophthalmologist at [facility] as soon as possible, and given strict return precautions. This patient was also evaluated by the attending physician. All care plans were discussed and agreed upon. Clinical Impression     1. Pain around left eye      Disposition     PATIENT REFERRED TO:  No follow-up provider specified.     DISCHARGE MEDICATIONS:  Discharge Medication List as of 9/30/2021  9:53 PM      START taking these medications    Details   dextran 70-hypromellose (ARTIFICIAL TEARS) 0.1-0.3 % SOLN opthalmic solution Place 1 drop into the left eye 4 times daily, Disp-15 mL, R-0Print      tobramycin (TOBREX) 0.3 % ophthalmic ointment Place 0.5 inches into

## 2021-10-01 NOTE — ED PROVIDER NOTES
ED Attending Attestation Note     Date of evaluation: 9/30/2021    This patient was seen by the advance practice provider. I have seen and examined the patient, agree with the workup, evaluation, management and diagnosis. The care plan has been discussed. My assessment reveals a well-appearing middle-aged female sitting up in the hospital stretcher no distress, she got chilly in her eye on Tuesday, no conjunctival erythema or drainage, lids were everted and swept no foreign bodies are seen. Pupils are equal and reactive bilaterally.      Nisa Lynn MD  09/30/21 [id number]

## 2021-10-01 NOTE — ED NOTES
Patient discharged to home with family. Patient verbalized understanding of discharge instructions. Advised of when to return to ED and when to call 911. Advised of follow up with PCP. Patient received 2 Rx's with education. Patient verbalized understanding of education. No questions from patient to this RN. Patient left ED without incident.       Tanisha Irizarry RN  09/30/21 [id number]

## 2021-10-27 ENCOUNTER — HOSPITAL ENCOUNTER (OUTPATIENT)
Dept: GENERAL RADIOLOGY | Age: 61
Discharge: HOME | End: 2021-10-27
Payer: COMMERCIAL

## 2021-10-27 DIAGNOSIS — K21.9 GASTROESOPHAGEAL REFLUX DISEASE, UNSPECIFIED WHETHER ESOPHAGITIS PRESENT: ICD-10-CM

## 2021-10-27 PROCEDURE — 74220 X-RAY XM ESOPHAGUS 1CNTRST: CPT

## 2021-11-09 ENCOUNTER — TELEPHONE (OUTPATIENT)
Dept: PRIMARY CARE CLINIC | Age: 61
End: 2021-11-09

## 2021-11-09 NOTE — TELEPHONE ENCOUNTER
Patient called in for cough medication she said she has had this medication before would like a refill  Please be advise

## 2021-11-11 ENCOUNTER — PROCEDURE VISIT (OUTPATIENT)
Dept: SPEECH THERAPY | Age: 61
End: 2021-11-11
Payer: COMMERCIAL

## 2021-11-11 ENCOUNTER — OFFICE VISIT (OUTPATIENT)
Dept: ENT CLINIC | Age: 61
End: 2021-11-11
Payer: COMMERCIAL

## 2021-11-11 VITALS
HEART RATE: 80 BPM | WEIGHT: 250 LBS | SYSTOLIC BLOOD PRESSURE: 130 MMHG | BODY MASS INDEX: 41.65 KG/M2 | DIASTOLIC BLOOD PRESSURE: 87 MMHG | HEIGHT: 65 IN

## 2021-11-11 DIAGNOSIS — R49.0 DYSPHONIA: ICD-10-CM

## 2021-11-11 DIAGNOSIS — J30.9 ALLERGIC RHINITIS, UNSPECIFIED SEASONALITY, UNSPECIFIED TRIGGER: ICD-10-CM

## 2021-11-11 DIAGNOSIS — J30.0 VASOMOTOR RHINITIS: ICD-10-CM

## 2021-11-11 DIAGNOSIS — K21.9 LARYNGOPHARYNGEAL REFLUX (LPR): ICD-10-CM

## 2021-11-11 DIAGNOSIS — R05.9 COUGH: ICD-10-CM

## 2021-11-11 DIAGNOSIS — R05.3 CHRONIC COUGH: Primary | ICD-10-CM

## 2021-11-11 PROCEDURE — 92524 BEHAVRAL QUALIT ANALYS VOICE: CPT | Performed by: SPEECH-LANGUAGE PATHOLOGIST

## 2021-11-11 PROCEDURE — 31579 LARYNGOSCOPY TELESCOPIC: CPT | Performed by: SPEECH-LANGUAGE PATHOLOGIST

## 2021-11-11 PROCEDURE — 99214 OFFICE O/P EST MOD 30 MIN: CPT | Performed by: OTOLARYNGOLOGY

## 2021-11-11 PROCEDURE — 92507 TX SP LANG VOICE COMM INDIV: CPT | Performed by: SPEECH-LANGUAGE PATHOLOGIST

## 2021-11-11 RX ORDER — IPRATROPIUM BROMIDE 42 UG/1
2 SPRAY, METERED NASAL 4 TIMES DAILY
Qty: 15 ML | Refills: 3 | Status: SHIPPED | OUTPATIENT
Start: 2021-11-11 | End: 2022-02-14 | Stop reason: SDUPTHER

## 2021-11-11 RX ORDER — BENZONATATE 100 MG/1
100 CAPSULE ORAL 3 TIMES DAILY PRN
Qty: 30 CAPSULE | Refills: 0 | Status: SHIPPED | OUTPATIENT
Start: 2021-11-11 | End: 2021-11-18

## 2021-11-11 NOTE — PROGRESS NOTES
[address]- HEAD & NECK SURGERY  Follow up      Patient Name: Lizbet Granado  Medical Record Number:  <[id number]  Primary Care Physician:  Camila Zuluaga MD  Date of Consultation: 11/11/2021    Chief Complaint: Chronic cough      Interval History  Patient is following up for chronic cough. I saw her in March, May and July 2021. She responded fairly well to Protonix initially. However her cough ended up recurring. She has seen gastroenterology since I saw her. She had an essentially normal swallow study. She was told to double up on her Protonix which she has done for the last few days. She does not think that it is helping anymore. She said that it seems to be worse whenever she goes to environments with changing temperatures. She says that about 8 years ago she was allergy tested and it was negative. It is quite troublesome for her and she would like to undergo further evaluation. REVIEW OF SYSTEMS    As above  PHYSICAL EXAM  GENERAL: No Acute Distress, Alert and Oriented, no Hoarseness, strong voice  EYES: EOMI, Anti-icteric  HENT:   Head: Normocephalic and atraumatic. Face:  Symmetric, facial nerve intact, no sinus tenderness  Right Ear: Normal external ear, normal external auditory canal, intact tympanic membrane with normal mobility and aerated middle ear  Left Ear: Normal external ear, normal external auditory canal, intact tympanic membrane with normal mobility and aerated middle ear  Mouth/Oral Cavity:  normal lips, Uvula is midline, no mucosal lesions  Oropharynx/Larynx:  normal oropharynx  Nose:Normal external nasal appearance. NECK: Normal range of motion, no thyromegaly, trachea is midline, no lymphadenopathy, no neck masses, no crepitus      ASSESSMENT/PLAN  1. Chronic cough  The patient is no longer responding just to reflux therapy. This certainly could still be ongoing reflux, however we could evaluate her for other issues.   The fact that she seems to have trouble with changing temperatures suggest there is a chance it could be vasomotor rhinitis with postnasal drip component. I will give her Atrovent for this. Also think that getting repeat allergy testing to rule this out is reasonable. I referred her for that. Finally this could be a habitual cough and I would like for her to be evaluated by her speech therapist.  Patient agrees with all the plan. 2. Laryngopharyngeal reflux (LPR)  New reflux medication as recommended by gastroenterology    3. Allergic rhinitis, unspecified seasonality, unspecified trigger  I will hold off on specific treatment for allergies. I am going to have her see an allergist for testing. It is pretty rare for allergy to cause a chronic cough, but we have ruled out a lot of other issues so I think it is reasonable to evaluate. 4. Vasomotor rhinitis  I am not sure she has vasomotor rhinitis, but her describing the cough happening during changes with temperature makes me think of this. We will try Atrovent for a while to see if it helps. I have performed a head and neck physical exam personally or was physically present during the key or critical portions of the service. This note was generated completely or in part utilizing Dragon dictation speech recognition software. Occasionally, words are mistranscribed and despite editing, the text may contain inaccuracies due to incorrect word recognition. If further clarification is needed please contact the office at [phone].

## 2021-11-11 NOTE — PROGRESS NOTES
[facility] ([facility]) ENT  Videostroboscopic Examination of the Larynx    BACKGROUND HISTORY:  Pt referred by ENT for dysphonia and chronic cough that has been present since 2009. Has been seen by pulmonology, GI and ENT; reported initial improvement w/ all treatments, but then cough would return shortly after. Has not seen allergy but ENT recommended this date. 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 Characterized cough as non-productive, and can occur in short bursts or prolonged episodes. Reported triggers including temperature changes (transferring from cool air to hot car, etc) and strong scents (ie spices when cooking). Has also noted occurs more frequently at night and after eating specific foods (cheese, tomato, etc). Feels a \"tickle\" or tingling in the throat prior to cough. Dysphonia associated w/ coughing including lowered modal pitch and roughness; denied pain or discomfort. Denied dysphagia or dyspnea. Surgical/Medical History: See the above. Hydration: >64oz of water  Smoking History: None  Caffeine Intake: NA    Esophagram 10/27/21  Impression       1. No evidence of any esophageal stricture or ulceration. 2. Mild intermittent esophageal dysmotility. PER ENT NOTE, Dr. Suzi Lyon, 11/11/21: The fact that she seems to have trouble with changing temperatures suggest there is a chance it could be vasomotor rhinitis with postnasal drip component. I will give her Atrovent for this. Also think that getting repeat allergy testing to rule this out is reasonable. I referred her for that. Finally this could be a habitual cough and I would like for her to be evaluated by her speech therapist.  Patient agrees with all the plan. Perceptual Quality: Pt presented with mild dysphonia characterized by roughness and breathiness.      Acoustic Analysis:  Maximum Sustained Phonation- 16 seconds  Avg Hz- 186  Max Hz- 516  Min Hz- 136    Flexible Stroboscopy Laryngoscopy  Procedure : Flexible Stroboscopy Laryngoscopy  Performed by: Claudia Duverney, SLP  Anesthesia: Afrin with 4% lidocaine  Description:  The scope was passed along the floor of the right naris to the level of the larynx. There was no evidence of concerning masses or lesions of the base of tongue, vallecula, epiglottis, aryepiglottic folds, arytenoids, false vocal folds, true vocal folds, or pyriform sinuses. The scope was removed. The patient tolerated the procedure without difficulty. There were no complications. Pertinent findings: See Assessment and Plan of Care       Abduction; edematous TVFs            Adduction; vertical height difference of arytenoids     Adduction; high pitch             Abduction      Cobblestoning of PPW    ASSESSMENT AND PLAN OF CARE:   63 y.o. female w/ hx of chronic cough and dysphonia. Pt w/ retroflexed epiglottis at baseline, making it difficult to fully visualize glottis t/o evaluation. VLS revealed edematous and erythematous TVFs bilaterally, secondary to irritation and ongoing cough. Glottic closure characterized as complete w/ difficulties fully assessing mucosal wave and periodicity d/t presence of supraglottic compression (LM>AP) restricting view of VFs; observed functional mucosal wave of posterior 1/3rd. Vertical height difference of arytenoids during adduction (L>R). Moderate interarytenoid pachydermia and posterior edema, erythema and thick mucus were observed, indicative of laryngopharyngeal reflux. Subglottis was patent without evidence of narrowing. No mass lesions, paresis or paralysis was noted. Noted to have significant cobblestoning down PPW. EDUCATION AND THERAPY:  Reviewed VLS w/ pt, including presence of generalized edema and suspected irritable larynx contributing to cough. Educated to possible impact of allergies given PPW cobblestoning and edematous changes of TVFs; agree w/ recommendation for further allergy testing.  Reflux education provided, including dietary and behavioral changes. Encouraged to continue taking medication daily and trial Gaviscon in conjunction for control of irritation. Introduced to [employer] to begin decreasing muscular tension and promote increased laryngeal control to assist w/ reduction in cough. Pt able to complete w/ min cues for gentle voicing and focus on increased airflow. Endorsed feeling more \"open\" after completion. Will initiate cough suppression techniques at next session. PATIENT GOALS:  Pt will adhere to vocal hygiene protocol during daily life activities w/ 80% acc given mod cues  Pt will adhere to reflux management protocol during daily life activities w/ 80% acc given mod cues  Pt will perform SOVT techniques during various voicing tasks w/ 80% acc given mod cues  Pt will perform RVT techniques during various voicing tasks w/ 80% acc given mod cues  Pt will perform resistive/abdominal breathing exercises at rest and during phonation w/ 80% acc given mod cues  Pt will complete laryngeal/general relaxation stretches/exercises w/ 80% acc given mod cues    SLP recommended: Yes  Barriers to treatment: None  Potential benefits from rehab include: Improved vocal quality; decreased cough  Frequency: 4 sessions over 6-8/wk  Prognosis is: Good    RECOMMENDATIONS:   1. Dr. Marycarmen Valdes was present and reviewed recorded evaluation to assist in diagnosis and provide assessment and plan for treatment. 2. Follow good vocal hygiene behaviors and precautions, including increasing oral hydration. 3. Follow dietary precautions and behavioral lifestyle changes regarding laryngopharyngeal reflux, including taking PPI as prescribed by physician. 4. Voice therapy to focus on re-strengthening and balancing the laryngeal musculature, to promote to open oral front focus, to promote using adequate diaphragmatic breath support to sustain conversational speech.    5. Pt is a good candidate for further medical evaluation/intervention at the discretion of the treating physician. 6. Pt to follow up with ENT/Dr. Luzma Bentley.       CPT Code Units Billed Time Billed Today Date of POC Start Re-Certification Date Referring Provider   53569, [id number], 23763, 36209 4 Unit Time in: 0772  Time out: 1600  Total time: 50 min 11/11/2021 60 days Dr. Luzma Bentley       Thank you,    José Villagran [place], [place], Edvin Villagomez; [id number]  Voice Specialized Speech-Language Pathologist

## 2021-12-01 NOTE — PROGRESS NOTES
[facility]) ENT  Voice Therapy      Pt Seen for Therapy - Session # 2     Diagnosis/Indication:   Primary: Dysphonia  Secondary: Irritable larynx  Tertiary: Chronic Cough    Background History:   Pt referred by ENT for dysphonia and chronic cough that has been present since 2009. Has been seen by pulmonology, GI and ENT; reported initial improvement w/ all treatments, but then cough would return shortly after. Has not seen allergy but ENT recommended this date. 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 Characterized cough as non-productive, and can occur in short bursts or prolonged episodes. Reported triggers including temperature changes (transferring from cool air to hot car, etc) and strong scents (ie spices when cooking). Has also noted occurs more frequently at night and after eating specific foods (cheese, tomato, etc). Feels a \"tickle\" or tingling in the throat prior to cough. Dysphonia associated w/ coughing including lowered modal pitch and roughness; denied pain or discomfort. Denied dysphagia or dyspnea. Previous SLP Evaluations: 11/11/21  VLS revealed edematous and erythematous TVFs bilaterally, secondary to irritation and ongoing cough. Glottic closure characterized as complete w/ difficulties fully assessing mucosal wave and periodicity d/t presence of supraglottic compression (LM>AP) restricting view of VFs; observed functional mucosal wave of posterior 1/3rd. Vertical height difference of arytenoids during adduction (L>R). SUBJECTIVE:   Pt endorsed positive improvement in cough at this time, characterized by decreased volume and length of cough; feels nasal spray prescribed by ENT has been most beneficial and stops cough temporarily after immediate use. Gaviscon made cough worse; stopped taking. Ongoing interest in allergy testing.     OBJECTIVE:   Voice Therapy    Goal: Session 2 Session 3 Session 4   Pt will adhere to vocal hygiene protocol during daily life activities w/ 80% acc   Pt adhered to vocal hygiene protocol w/ 70% acc    Endorsed completing all recommendations daily, including exercise and medications. Pt will adhere to reflux management protocol during daily life activities w/ 80% acc   Pt adhered to reflux management protocol w/ 70% acc    Reported ongoing use of medication however, stopped Gaviscon as it increased cough each time after taking. Pt will perform SOVT techniques during various voicing tasks w/ 80% acc    Goal not targeted this session. Encouraged to continue completing daily; endorsed mild hoarseness after coughing fits and educated to irritation/edema of VFs. Expressed understanding. Pt will perform cough suppression techniques during daily life activities w/ 80% acc   Pt performed cough suppression techniques w/ 65% acc independently; introduced to both cough suppression (tickle/urge) and resistive breathing (attacks/spasms) techniques and pt began to independently utilize during session. Able to complete resistive breathing to both stop and decrease need for coughing fit; felt exercise was effective and did resolve urge quickly. Motivated for ongoing improvement. ASSESSMENT:      63 y.o. female w/ hx of chronic cough and dysphonia; endorsed positive improvement (decreased amount and episodes) in cough at this time, though still present. Endorsed nasal spray is most effective at this time, reducing urge to cough immediately after using. D/t this, high suspicion for allergy component impacting cough. Recommendation for allergy testing and will discuss further w/ ENT. Introduced to both cough suppression and resistive breathing techniques for ongoing reduction in cycle of irritation; pt w/ independent implementation during session and able to stop/suppression coughing attack w/ breathing. Endorsed increased control and does feel techniques will be effective.  Overall, pt is progressing towards goals and prognosis remains good w/ ongoing adherence to recommendations and further medical management. RECOMMENDATIONS:      1.  Follow HEP and RTC for ongoing VOT  2.   RTC after allergy testing    CPT Code Units Billed Time Billed Today Date of POC Start Re-Certification Date Referring Provider   [id number] 1 Unit Time in: 7411  Time out: 1630  Total time: 35 min 11/11/21 60 days Dr. Dee Dee Meza       Thank you,    [place]) [facility]; [id number]  Voice Specialized Speech-Language Pathologist

## 2021-12-02 ENCOUNTER — PROCEDURE VISIT (OUTPATIENT)
Dept: SPEECH THERAPY | Age: 61
End: 2021-12-02
Payer: COMMERCIAL

## 2021-12-02 DIAGNOSIS — R05.9 COUGH: ICD-10-CM

## 2021-12-02 DIAGNOSIS — R49.0 DYSPHONIA: ICD-10-CM

## 2021-12-02 PROCEDURE — 92507 TX SP LANG VOICE COMM INDIV: CPT | Performed by: SPEECH-LANGUAGE PATHOLOGIST

## 2021-12-03 ENCOUNTER — TELEPHONE (OUTPATIENT)
Dept: ENT CLINIC | Age: 61
End: 2021-12-03

## 2021-12-12 ENCOUNTER — HOSPITAL ENCOUNTER (EMERGENCY)
Age: 61
Discharge: HOME | End: 2021-12-12
Attending: STUDENT IN AN ORGANIZED HEALTH CARE EDUCATION/TRAINING PROGRAM
Payer: COMMERCIAL

## 2021-12-12 ENCOUNTER — APPOINTMENT (OUTPATIENT)
Dept: GENERAL RADIOLOGY | Age: 61
End: 2021-12-12
Payer: COMMERCIAL

## 2021-12-12 VITALS
BODY MASS INDEX: 39.99 KG/M2 | HEIGHT: 65 IN | HEART RATE: 76 BPM | DIASTOLIC BLOOD PRESSURE: 68 MMHG | WEIGHT: 240 LBS | OXYGEN SATURATION: 100 % | TEMPERATURE: 98.4 F | SYSTOLIC BLOOD PRESSURE: 145 MMHG | RESPIRATION RATE: 18 BRPM

## 2021-12-12 DIAGNOSIS — M25.562 ACUTE PAIN OF LEFT KNEE: Primary | ICD-10-CM

## 2021-12-12 PROCEDURE — 99283 EMERGENCY DEPT VISIT LOW MDM: CPT

## 2021-12-12 PROCEDURE — 96372 THER/PROPH/DIAG INJ SC/IM: CPT

## 2021-12-12 PROCEDURE — 73562 X-RAY EXAM OF KNEE 3: CPT

## 2021-12-12 PROCEDURE — 6360000002 HC RX W HCPCS: Performed by: PHYSICIAN ASSISTANT

## 2021-12-12 RX ORDER — KETOROLAC TROMETHAMINE 30 MG/ML
15 INJECTION, SOLUTION INTRAMUSCULAR; INTRAVENOUS ONCE
Status: COMPLETED | OUTPATIENT
Start: 2021-12-12 | End: 2021-12-12

## 2021-12-12 RX ADMIN — KETOROLAC TROMETHAMINE 15 MG: 30 INJECTION, SOLUTION INTRAMUSCULAR at 21:18

## 2021-12-12 ASSESSMENT — PAIN DESCRIPTION - FREQUENCY: FREQUENCY: CONTINUOUS

## 2021-12-12 ASSESSMENT — PAIN DESCRIPTION - PAIN TYPE: TYPE: ACUTE PAIN

## 2021-12-12 ASSESSMENT — PAIN DESCRIPTION - LOCATION: LOCATION: KNEE

## 2021-12-12 ASSESSMENT — PAIN SCALES - GENERAL
PAINLEVEL_OUTOF10: 8
PAINLEVEL_OUTOF10: 9

## 2021-12-12 ASSESSMENT — PAIN DESCRIPTION - ONSET: ONSET: SUDDEN

## 2021-12-12 ASSESSMENT — PAIN DESCRIPTION - ORIENTATION: ORIENTATION: LEFT

## 2021-12-13 NOTE — ED PROVIDER NOTES
[facility] RESIDENT NOTE       Date of evaluation: 2021    Chief Complaint     Knee Injury (hit left knee on car door yesterday, c/o throbbing and burning pain, hurts to walk )      History of Present Illness     Юлия Barahona is a 63 y.o. female who presents to [facility] ED for complaints of left knee pain. She states yesterday around 5 or 6 PM she was getting out of her car when she hit the front of her knee directly on the edge of the car door. She reported pain immediately to the anterior portion of her left knee. She states she applied Voltaren gel which she takes for osteoarthritis in her right knee with slight improvement. She states upon awakening this morning she described burning and more discomfort in her left knee. She reports she is able to bear weight although she walks with a limp due to pain. She states she is scheduled to see her orthopedic surgeon tomorrow for injection in her right knee. She denies any fever, chills, erythema or warmth. Review of Systems     Review of Systems  All systems were reviewed with the patient/family and negative except as otherwise documented in the HPI. Past Medical, Surgical, Family, and Social History     She has a past medical history of GERD (gastroesophageal reflux disease), Gestational diabetes, Hypertension, Morbid obesity due to excess calories ([address] Severe persistent asthma, Vitamin D deficiency, and Xerosis cutis. She has a past surgical history that includes  section (). Her family history is not on file. She reports that she has never smoked. She has never used smokeless tobacco. She reports current alcohol use. She reports that she does not use drugs.     Medications     Previous Medications    CALTRATE 600+D PLUS MINERALS (CALTRATE) 600-800 MG-UNIT TABS TABLET    Take 1 tablet by mouth 2 times daily Okay to give generic equivalent and if not covered direct patient to buy OTC. CANDESARTAN (ATACAND) 32 MG TABLET    Take 1 tablet by mouth daily    DICLOFENAC SODIUM (VOLTAREN) 1 % GEL    Apply topically as needed for Pain Using on left knee since yesterday    HYDROCHLOROTHIAZIDE (HYDRODIURIL) 25 MG TABLET    Take 1 tablet by mouth every morning    IPRATROPIUM (ATROVENT) 0.06 % NASAL SPRAY    2 sprays by Each Nostril route 4 times daily    PANTOPRAZOLE (PROTONIX) 40 MG TABLET    Take 1 tablet by mouth every morning (before breakfast)    VITAMIN D3 (CHOLECALCIFEROL) 25 MCG (1000 UT) TABS TABLET    Take 2 tablets by mouth daily       Allergies     She is allergic to chloroquine, lisinopril, mefloquine, and quinolones. Physical Exam     INITIAL VITALS: BP: (!) 145/68, Temp: 98.4 °F (36.9 °C), Pulse: 76, Resp: 18, SpO2: 100 %   Physical Exam  Constitutional:       Appearance: Normal appearance. She is obese. Eyes:      Extraocular Movements: Extraocular movements intact. Pupils: Pupils are equal, round, and reactive to light. Cardiovascular:      Rate and Rhythm: Normal rate and regular rhythm. Pulses: Normal pulses. Pulmonary:      Effort: Pulmonary effort is normal.   Musculoskeletal:      Comments: Range of motion. Within normal limits although she did have discomfort in the medial aspect of left knee. Strength equal bilaterally. Skin:     General: Skin is warm. Neurological:      General: No focal deficit present. Mental Status: She is alert and oriented to person, place, and time. Psychiatric:         Mood and Affect: Mood normal.         DiagnosticResults     RADIOLOGY:  XR KNEE LEFT (3 VIEWS)   Final Result      1. Negative          LABS:   No results found for this visit on 12/12/21.     ED BEDSIDE ULTRASOUND:    RECENT VITALS:  BP: (!) 145/68, Temp: 98.4 °F (36.9 °C), Pulse: 76,Resp: 18, SpO2: 100 %     Procedures     ED Course     Nursing Notes, Past Medical Hx, Past Surgical Hx, Social Hx, Allergies, and Family Hx were

## 2021-12-14 ENCOUNTER — OFFICE VISIT (OUTPATIENT)
Dept: ORTHOPEDIC SURGERY | Age: 61
End: 2021-12-14
Payer: COMMERCIAL

## 2021-12-14 VITALS — BODY MASS INDEX: 39.99 KG/M2 | TEMPERATURE: 97.1 F | WEIGHT: 240 LBS | HEIGHT: 65 IN

## 2021-12-14 DIAGNOSIS — M25.562 LEFT KNEE PAIN, UNSPECIFIED CHRONICITY: ICD-10-CM

## 2021-12-14 DIAGNOSIS — M17.12 PRIMARY OSTEOARTHRITIS OF LEFT KNEE: ICD-10-CM

## 2021-12-14 DIAGNOSIS — M17.11 PRIMARY OSTEOARTHRITIS OF RIGHT KNEE: Primary | ICD-10-CM

## 2021-12-14 DIAGNOSIS — G89.29 CHRONIC PAIN OF RIGHT KNEE: ICD-10-CM

## 2021-12-14 DIAGNOSIS — S80.02XA CONTUSION OF LEFT KNEE, INITIAL ENCOUNTER: ICD-10-CM

## 2021-12-14 DIAGNOSIS — M25.561 CHRONIC PAIN OF RIGHT KNEE: ICD-10-CM

## 2021-12-14 PROCEDURE — 99213 OFFICE O/P EST LOW 20 MIN: CPT | Performed by: PHYSICIAN ASSISTANT

## 2021-12-14 PROCEDURE — 20610 DRAIN/INJ JOINT/BURSA W/O US: CPT | Performed by: PHYSICIAN ASSISTANT

## 2021-12-14 RX ORDER — METHYLPREDNISOLONE ACETATE 40 MG/ML
40 INJECTION, SUSPENSION INTRA-ARTICULAR; INTRALESIONAL; INTRAMUSCULAR; SOFT TISSUE ONCE
Status: COMPLETED | OUTPATIENT
Start: 2021-12-14 | End: 2021-12-14

## 2021-12-14 RX ORDER — LIDOCAINE HYDROCHLORIDE 10 MG/ML
20 INJECTION, SOLUTION INFILTRATION; PERINEURAL ONCE
Status: COMPLETED | OUTPATIENT
Start: 2021-12-14 | End: 2021-12-14

## 2021-12-14 RX ADMIN — METHYLPREDNISOLONE ACETATE 40 MG: 40 INJECTION, SUSPENSION INTRA-ARTICULAR; INTRALESIONAL; INTRAMUSCULAR; SOFT TISSUE at 10:17

## 2021-12-14 RX ADMIN — LIDOCAINE HYDROCHLORIDE 20 ML: 10 INJECTION, SOLUTION INFILTRATION; PERINEURAL at 10:16

## 2021-12-16 NOTE — PROGRESS NOTES
[facility]  History and Physical      Date:  2021    Name:  Glo Baumann  Address:  [address] [place] [id number]    :  1960      Age:   63 y.o. Chief Complaint    Knee Pain (R knee synvisc inj)      History of Present Illness:  Glo Baumann is a 63 y.o. female who presents for follow-up evaluation and viscosupplementation injection in the right knee. This patient is being treated with conservative therapy for the arthritis in her right knee. This conservative therapy includes: rest, ice, elevation, home exercises, activity modification, NSAIDs as needed, corticosteroid injections and visco supplementation injections. Patient continues to note persistent achy right knee pain. She does note that over the past week she has developed left knee pain after suffering a contusion to the left knee when she accidentally ran it into her car door. Since then she has been experiencing sharp lateral joint line pain. She does note occasional catching her left knee. She denies sensations of instability. She was seen in the ER for her left knee pain but had an unremarkable work-up. The patient denies any numbness, paresthesias, or weakness. History from the patient's last visit on 2021:  Glo Baumann is a 64 y.o. female who presents for follow-up evaluation of her right knee pain. This patient is known to [facility] is being treated with conservative therapy for the arthritis in her right knee. Patient's conservative therapy includes: rest, ice, elevation, bracing, home exercises, activity modification, NSAIDs as needed, and corticosteroid injections. Patient is also utilizing a topical gabapentin cream for a pinpoint neuralgia over the lateral thigh. This patient was last seen on 2020 which time she had a corticosteroid injection.   She feels that these last her 3 to 4 months before she notices an insidious return of her baseline osteoarthritic symptoms. The patient denies any new injury. The patient denies any giving way, joint locking, numbness, paresthesias, or weakness. Pain Assessment  Location of Pain: Knee  Location Modifiers: Right  Severity of Pain: 7  Quality of Pain: Aching  Duration of Pain: Persistent  Frequency of Pain: Constant  Limiting Behavior: Some  Result of Injury: No  Work-Related Injury: No  Are there other pain locations you wish to document?: No    Past Medical History:   Diagnosis Date    GERD (gastroesophageal reflux disease)     Gestational diabetes     Hypertension     Morbid obesity due to excess calories ([facility] [address] 2016    Severe persistent asthma 2019    Vitamin D deficiency     Xerosis cutis         Past Surgical History:   Procedure Laterality Date     SECTION         No family history on file. Social History     Socioeconomic History    Marital status:      Spouse name: None    Number of children: None    Years of education: None    Highest education level: None   Occupational History    None   Tobacco Use    Smoking status: Never Smoker    Smokeless tobacco: Never Used   Vaping Use    Vaping Use: Never used   Substance and Sexual Activity    Alcohol use: Yes     Alcohol/week: 0.0 standard drinks     Comment: rare    Drug use: No    Sexual activity: Yes     Partners: Male   Other Topics Concern    None   Social History Narrative    None     Social Determinants of Health     Financial Resource Strain: Low Risk     Difficulty of Paying Living Expenses: Not hard at all   Food Insecurity: No Food Insecurity    Worried About Running Out of Food in the Last Year: Never true    [address] in the Last Year: Never true   Transportation Needs:     Lack of Transportation (Medical): Not on file    Lack of Transportation (Non-Medical):  Not on file   Physical Activity:     Days of Exercise per Week: Not on file    Minutes of Exercise per Session: Not on file   Stress:     Feeling of Stress : Not on file   Social Connections:     Frequency of Communication with Friends and Family: Not on file    Frequency of Social Gatherings with Friends and Family: Not on file    Attends [employer] Services: Not on file    Active Member of [address] [place] [address] or Organizations: Not on file    Attends Club or Organization Meetings: Not on file    Marital Status: Not on file   Intimate Partner Violence:     Fear of Current or Ex-Partner: Not on file    Emotionally Abused: Not on file    Physically Abused: Not on file    Sexually Abused: Not on file   Housing Stability:     Unable to Pay for Housing in the Last Year: Not on file    Number of Jichetnamodannielle in the Last Year: Not on file    Unstable Housing in the Last Year: Not on file       Current Outpatient Medications   Medication Sig Dispense Refill    diclofenac sodium (VOLTAREN) 1 % GEL Apply topically as needed for Pain Using on left knee since yesterday      ipratropium (ATROVENT) 0.06 % nasal spray 2 sprays by Each Nostril route 4 times daily 15 mL 3    pantoprazole (PROTONIX) 40 MG tablet Take 1 tablet by mouth every morning (before breakfast) 90 tablet 1    candesartan (ATACAND) 32 MG tablet Take 1 tablet by mouth daily 90 tablet 3    hydroCHLOROthiazide (HYDRODIURIL) 25 MG tablet Take 1 tablet by mouth every morning 90 tablet 5    Caltrate 600+D Plus Minerals (CALTRATE) 600-800 MG-UNIT TABS tablet Take 1 tablet by mouth 2 times daily Okay to give generic equivalent and if not covered direct patient to buy OTC. 60 tablet 11    vitamin D3 (CHOLECALCIFEROL) 25 MCG (1000 UT) TABS tablet Take 2 tablets by mouth daily 60 tablet 11     No current facility-administered medications for this visit.        Allergies   Allergen Reactions    Chloroquine     Lisinopril      Coughing and choking    Mefloquine     Quinolones          Review of Systems:  A 14 point review of systems was completed by the patient and is available in the media section of the scanned medical record and was reviewed. Vital Signs:   Temp 97.1 °F (36.2 °C) (Infrared)   Ht 5' 5\" (1.651 m)   Wt 240 lb (108.9 kg)   LMP 10/10/2014   BMI 39.94 kg/m²     General Exam:    General: Melissa Bolanos is a healthy and well appearing 63y.o. year old female who is sitting comfortably in our office in no acute distress. Neuro: Alert & Oriented x 3,  normal,  no focal deficits noted. Normal mood & affect. Eyes: Sclera clear  Ears: Normal external ear  Mouth: Patient wearing a mask  Pulm: Respirations unlabored and regular   Skin: Warm, well perfused      Knee Examination: Right     Inspection: Healed scars from prior surgery. No effusion, ecchymosis, discoloration, erythema, excessive warmth. no gross deformities, no signs of infection.      Palpation: There is patellofemoral crepitus, there is medial joint line tenderness. No other osseous or soft tissue tenderness around the knee. Negative calf tenderness     Range of Motion: 0-125 degrees without pain or difficulty     Strength: Grossly intact     Special Tests: No ligament instability, valgus and varus stress test are stable at 0 and 30°. Negative Homans sign     Gait:  Steady, Non antalgic, without the use of assistive devices     Alignment: Varus deformity     Neuro: Sensation equal bilateral lower extremities     Vascular: 2+ posterior tibialis pulse      Contralateral Knee Comparison Examination: Left    Inspection:  No effusion, ecchymosis, discoloration, erythema, excessive warmth. no gross deformities, no signs of infection. Palpation: There is patellofemoral crepitus, there is sharp lateral joint line tenderness. No other osseous or soft tissue tenderness around the knee.   Negative calf tenderness    Range of Motion:   0-120 degrees with lateral joint line discomfort    Strength: Grossly intact    Special Tests:   No ligament instability, valgus and varus stress test are stable at 0 and 30°. Negative Homans sign    Gait:  antalgic, without the use of assistive devices    Alignment: Varus deformity    Neuro: Sensation equal bilateral lower extremities    Vascular: 2+ posterior tibialis pulse      Radiology:   Previously obtain imaging reviewed. No new images obtained. Office Procedures:  Orders Placed This Encounter   Procedures    AR ARTHROCENTESIS ASPIR&/INJ MAJOR JT/BURSA W/O US     VISCO SUPPLEMENTATION  INJECTION: Right KNEE    PROCEDURE: Risks, benefits, and alternatives to the injections were discussed in detail with the patient. The risks discussed included but are not limited to infection, skin reactions, hot swollen joints, and anaphylaxis. After informed consent was provided, the patient lay supine on the exam table. The superolateral aspect of the right knee was prepped with Chlora-prep. The skin and subcutaneous tissues were anesthetized with ethyl chloride spray and 1% lidocaine injected with a 20-gauge needle. An 18- gauge needle was inserted intra-articularly and Synvisc One 48mg was injected  without resistance. The needle was withdrawn and the puncture site sealed with a Band-Aid. The patient tolerated the procedure well. After informed consent was provided, the patient was seated on the exam table with the left knee flexed to 90 degrees. The anterolateral aspect of the knee adjacent to the joint line was prepped with Chlora-prep. The skin and subcutaneous tissues were anesthetized with ethyl chloride spray. A 22-gauge needle was inserted into the left knee and 2 ml of 40 mg/ml DepoMedrol was injected. The needle was withdrawn and the puncture site sealed with a Band-Aid. The patient tolerated the procedure well. Post injection instructions and precautions given and any problems to notify us. Assessment: Patient is a 63 y.o. female presenting to the office for follow-up evaluation and coordination of care for bilateral knee pain. Patient is a diagnosis of osteoarthritis of both knees with a possible lateral meniscus tear in the left knee in addition to a left knee contusion. Encounter Diagnoses   Name Primary?  Primary osteoarthritis of right knee Yes    Left knee pain, unspecified chronicity     Primary osteoarthritis of left knee     Chronic pain of right knee     Contusion of left knee, initial encounter        Orders Placed This Encounter   Medications    methylPREDNISolone acetate (DEPO-MEDROL) injection 40 mg    Hylan injection 48 mg    lidocaine 1 % injection 20 mL       Medical decision making:  Patient's workup and evaluation were reviewed with the patient in the office today. Imaging reviewed with the patient. Patient was educated on postinjection precautions. Given the patient's history and physical exam I believe the reasonable degree of medical certainty the patient has suffered a contusion to the left knee with a possible lateral meniscus tear. She has significant lateral joint line tenderness. Conservative versus possible surgical procedure of an arthroscopy to address a lateral meniscus tear was thoroughly discussed. Given that the injury is so acute I believe the patient should attempt a trial of conservative treatment as the literature suggests approximately 50% of some meniscus tears resolve with conservative treatment over the course of 3 to 6 months. Patient was offered an MRI but agrees that a conservative approach is the most appropriate at this time. All the patient's questions were answered while in the clinic. The patient is understanding of all instructions and agrees with the plan. Treatment Plan:    1. Observe postinjection precautions  2. Decrease amount of time ambulating  3. Utilize assistive device as needed for stability while ambulating  4. MRI of the left knee without contrast to assess for lateral meniscus tear if symptoms do not improve  5.  Activity modification/Rest 6. Ice 20 minutes ever 1-2 hours PRN  7. Take medications as prescribed/instructed  8. Elevation   9. Lightweight exercise/low impact exercise  10. Appropriate diet/weight management      Follow up: 8 weeks and as needed    This patient exhibits moderate complexity for obtaining an independent history, reviewing past medical records, independent interpretation/review of diagnostic imaging, and further coordinating care. The patient exhibits low risk given that the patient is being treated with conservative therapy. Approximately 30 minutes were spent reviewing past medical records, imaging, educating the patient, and coordinating care. Kayla Samson PA-C    Physician Assistant - Certified    64/71/90 10:36 AM        This dictation was performed with a verbal recognition program (DRAGON) and it was checked for errors. It is possible that there are still dictated errors within this office note. If so, please bring any errors to my attention for an addendum. All efforts were made to ensure that this office note is accurate.

## 2021-12-17 ENCOUNTER — OFFICE VISIT (OUTPATIENT)
Dept: PRIMARY CARE CLINIC | Age: 61
End: 2021-12-17
Payer: COMMERCIAL

## 2021-12-17 VITALS
TEMPERATURE: 98.2 F | WEIGHT: 247.2 LBS | OXYGEN SATURATION: 98 % | SYSTOLIC BLOOD PRESSURE: 132 MMHG | HEIGHT: 65 IN | DIASTOLIC BLOOD PRESSURE: 82 MMHG | HEART RATE: 78 BPM | BODY MASS INDEX: 41.19 KG/M2

## 2021-12-17 DIAGNOSIS — R63.8 WEIGHT DISORDER: ICD-10-CM

## 2021-12-17 DIAGNOSIS — I10 ESSENTIAL HYPERTENSION: ICD-10-CM

## 2021-12-17 DIAGNOSIS — Z23 NEED FOR SHINGLES VACCINE: ICD-10-CM

## 2021-12-17 DIAGNOSIS — R53.83 OTHER FATIGUE: ICD-10-CM

## 2021-12-17 DIAGNOSIS — Z12.4 SCREENING FOR CERVICAL CANCER: ICD-10-CM

## 2021-12-17 DIAGNOSIS — R25.2 MUSCLE CRAMPS: ICD-10-CM

## 2021-12-17 DIAGNOSIS — K21.9 GASTROESOPHAGEAL REFLUX DISEASE WITHOUT ESOPHAGITIS: ICD-10-CM

## 2021-12-17 DIAGNOSIS — Z00.00 PHYSICAL EXAM: Primary | ICD-10-CM

## 2021-12-17 PROCEDURE — 90471 IMMUNIZATION ADMIN: CPT | Performed by: INTERNAL MEDICINE

## 2021-12-17 PROCEDURE — 90750 HZV VACC RECOMBINANT IM: CPT | Performed by: INTERNAL MEDICINE

## 2021-12-17 PROCEDURE — 99396 PREV VISIT EST AGE 40-64: CPT | Performed by: INTERNAL MEDICINE

## 2021-12-17 RX ORDER — CANDESARTAN 32 MG/1
32 TABLET ORAL DAILY
Qty: 90 TABLET | Refills: 4 | Status: SHIPPED | OUTPATIENT
Start: 2021-12-17 | End: 2022-08-05

## 2021-12-17 RX ORDER — PANTOPRAZOLE SODIUM 40 MG/1
40 TABLET, DELAYED RELEASE ORAL DAILY PRN
Qty: 90 TABLET | Refills: 1 | Status: SHIPPED | OUTPATIENT
Start: 2021-12-17 | End: 2022-08-05

## 2021-12-17 RX ORDER — CAL/D3/MAG11/ZINC/COP/MANG/BOR 600 MG-800
1 TABLET ORAL 2 TIMES DAILY
Qty: 60 TABLET | Refills: 11 | Status: SHIPPED | OUTPATIENT
Start: 2021-12-17

## 2021-12-17 RX ORDER — HYDROCHLOROTHIAZIDE 25 MG/1
25 TABLET ORAL EVERY MORNING
Qty: 90 TABLET | Refills: 5 | Status: SHIPPED | OUTPATIENT
Start: 2021-12-17 | End: 2022-08-05

## 2021-12-17 ASSESSMENT — ENCOUNTER SYMPTOMS
BLOOD IN STOOL: 0
CONSTIPATION: 0
WHEEZING: 0
ABDOMINAL DISTENTION: 0
DIARRHEA: 0
GASTROINTESTINAL NEGATIVE: 1
SHORTNESS OF BREATH: 0
COUGH: 0
EYES NEGATIVE: 1
CHEST TIGHTNESS: 0
ABDOMINAL PAIN: 0

## 2021-12-17 NOTE — PROGRESS NOTES
Subjective:      Patient ID: Doug Caraballo is a 63 y.o. female. 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     Her prior echocardiogram from 3/2021 shows  demonstrates hyperdynamic systolic function with an EF of 65%, mild concentric LVH and no regional wall abnormalities or diastolic dysfunction. Last seen  VV 3/26/2021 Patient presents with:  Results: discuss results from 12/2020  Other: cramps in legs off and on merissa after lying on leather couch ? ?               4/30/2020 Patient presents with:  Lower Back Pain: just started a few hrs ago now feels spasms and extreme pain   Knee Pain: better with Celebrex . Also got a shot from Ortho a few hrs ago                 By VV4/22/2020 Patient presents with:  Pain: rt knee and thigh > 6 mths . Getting unbearable now     Has seen Ortho . nsaids not helping . Tried PT but no relief               12/30/2019   Dr Anthony Landeros Patient presents with: Annual Exam. Needs for Work              Review of Systems   Constitutional: Positive for fatigue. Negative for activity change, appetite change, fever and unexpected weight change. Tdap 2015     Flu Vac 11/21 at school     Pneum Vac     Shingrex/ script  12/30/19    Covid Vac 10/21  Third booster    HENT: Negative. Dental ex reg    Eyes: Negative. Negative for visual disturbance. Last Eye exam  12/21   Respiratory: Negative for cough, chest tightness, shortness of breath and wheezing. Does not smoke . Rare wine     Asthma  >>   Cardiovascular:        HTN  2009     No known CAD . No FH either     Fairly active    Gastrointestinal: Negative.   Negative for abdominal distention, abdominal pain, blood in stool, constipation and diarrhea. No FH of Ca colon     Colonoscopy 2018    Endocrine:        No FH of diabetes    Genitourinary: Negative for dysuria, frequency, menstrual problem, urgency and vaginal discharge. Natural menopause 2017     Last pelvic / pap 2017     One 16 yr old  . Gestational Diabetes     Mammogram 3/21   Musculoskeletal: Positive for arthralgias. S/P  IA injec/ knee    H/O Torn Meniscus rt    Allergic/Immunologic: Positive for environmental allergies. Negative for food allergies. Neurological: Negative for dizziness, weakness and headaches. Psychiatric/Behavioral: Negative for behavioral problems, dysphoric mood and sleep disturbance. The patient is not nervous/anxious. Objective:   Physical Exam  Constitutional:       General: She is not in acute distress. Appearance: She is obese. Eyes:      Extraocular Movements: Extraocular movements intact. Cardiovascular:      Rate and Rhythm: Regular rhythm. Heart sounds: Normal heart sounds. Pulmonary:      Effort: Pulmonary effort is normal.      Breath sounds: No wheezing or rales. Abdominal:      General: There is distension. Musculoskeletal:         General: Normal range of motion. Cervical back: Neck supple. Right lower leg: No edema. Left lower leg: No edema. Skin:     General: Skin is warm. Neurological:      General: No focal deficit present. Mental Status: She is oriented to person, place, and time.    Psychiatric:         Mood and Affect: Mood normal.         Behavior: Behavior normal.         Assessment:   Iris was seen today for annual exam.    Diagnoses and all orders for this visit:    Physical exam   BMI > 41  Labs done recently wnl    Weight disorder  Advised low carb diet + Intermittent Fasting     Other fatigue  If it persists can get Sleep Study    Need for shingles vaccine  -     zoster recombinant adjuvanted vaccine [facility]) 50 MCG/0.5ML SUSR injection; Inject 0.5 mLs into the muscle once for 1 dose    Screening for cervical cancer  -     Brando Kelley MD, Gynecology, [facility]         essential Hypertension  Controlled    Atacand + Hctz . -     candesartan (ATACAND) 32 MG tablet; Take 1 tablet by mouth daily  -     hydroCHLOROthiazide (HYDRODIURIL) 25 MG tablet; Take 1 tablet by mouth every morning      Gastroesophageal reflux disease without esophagitis use ppi prn   -     pantoprazole (PROTONIX) 40 MG tablet;  Take 1 tablet by mouth every morning (before breakfast)            Plan:           Jimbo Hernandez MD

## 2022-02-14 ENCOUNTER — OFFICE VISIT (OUTPATIENT)
Dept: ENT CLINIC | Age: 62
End: 2022-02-14
Payer: COMMERCIAL

## 2022-02-14 VITALS
WEIGHT: 244.6 LBS | HEIGHT: 65 IN | SYSTOLIC BLOOD PRESSURE: 126 MMHG | BODY MASS INDEX: 40.75 KG/M2 | HEART RATE: 74 BPM | DIASTOLIC BLOOD PRESSURE: 74 MMHG

## 2022-02-14 DIAGNOSIS — J30.0 VASOMOTOR RHINITIS: ICD-10-CM

## 2022-02-14 DIAGNOSIS — J30.9 ALLERGIC RHINITIS, UNSPECIFIED SEASONALITY, UNSPECIFIED TRIGGER: ICD-10-CM

## 2022-02-14 DIAGNOSIS — K21.9 LARYNGOPHARYNGEAL REFLUX (LPR): ICD-10-CM

## 2022-02-14 DIAGNOSIS — R05.3 CHRONIC COUGH: Primary | ICD-10-CM

## 2022-02-14 PROCEDURE — 99214 OFFICE O/P EST MOD 30 MIN: CPT | Performed by: OTOLARYNGOLOGY

## 2022-02-14 RX ORDER — IPRATROPIUM BROMIDE 42 UG/1
2 SPRAY, METERED NASAL 4 TIMES DAILY
Qty: 15 ML | Refills: 5 | Status: SHIPPED | OUTPATIENT
Start: 2022-02-14

## 2022-02-14 NOTE — PROGRESS NOTES
reflux (LPR)  She is already on twice daily Protonix. 3. Vasomotor rhinitis  Represcribed Atrovent  4. Allergic rhinitis, unspecified seasonality, unspecified trigger  I referred her to allergy to see if there is something that could be triggering this coughing to return  - External Referral To Allergy             I have performed a head and neck physical exam personally or was physically present during the key or critical portions of the service. This note was generated completely or in part utilizing Dragon dictation speech recognition software. Occasionally, words are mistranscribed and despite editing, the text may contain inaccuracies due to incorrect word recognition. If further clarification is needed please contact the office at [phone].

## 2022-03-09 ENCOUNTER — HOSPITAL ENCOUNTER (EMERGENCY)
Age: 62
Discharge: HOME | End: 2022-03-09
Attending: EMERGENCY MEDICINE
Payer: COMMERCIAL

## 2022-03-09 ENCOUNTER — APPOINTMENT (OUTPATIENT)
Dept: GENERAL RADIOLOGY | Age: 62
End: 2022-03-09
Payer: COMMERCIAL

## 2022-03-09 VITALS
BODY MASS INDEX: 42.47 KG/M2 | TEMPERATURE: 98.4 F | RESPIRATION RATE: 15 BRPM | DIASTOLIC BLOOD PRESSURE: 64 MMHG | HEART RATE: 75 BPM | SYSTOLIC BLOOD PRESSURE: 144 MMHG | HEIGHT: 64 IN | WEIGHT: 248.8 LBS | OXYGEN SATURATION: 100 %

## 2022-03-09 DIAGNOSIS — M79.672 LEFT FOOT PAIN: Primary | ICD-10-CM

## 2022-03-09 PROCEDURE — 73650 X-RAY EXAM OF HEEL: CPT

## 2022-03-09 PROCEDURE — 99283 EMERGENCY DEPT VISIT LOW MDM: CPT

## 2022-03-09 RX ORDER — LIDOCAINE 4 G/G
1 PATCH TOPICAL DAILY
Status: DISCONTINUED | OUTPATIENT
Start: 2022-03-09 | End: 2022-03-09 | Stop reason: HOSPADM

## 2022-03-09 RX ORDER — LIDOCAINE 50 MG/G
1 PATCH TOPICAL DAILY
Qty: 30 PATCH | Refills: 0 | Status: SHIPPED | OUTPATIENT
Start: 2022-03-09 | End: 2022-08-10

## 2022-03-09 RX ORDER — IBUPROFEN 400 MG/1
400 TABLET ORAL ONCE
Status: DISCONTINUED | OUTPATIENT
Start: 2022-03-09 | End: 2022-03-09 | Stop reason: HOSPADM

## 2022-03-09 RX ORDER — ACETAMINOPHEN 325 MG/1
650 TABLET ORAL ONCE
Status: DISCONTINUED | OUTPATIENT
Start: 2022-03-09 | End: 2022-03-09 | Stop reason: HOSPADM

## 2022-03-09 RX ORDER — ACETAMINOPHEN 500 MG
500 TABLET ORAL EVERY 6 HOURS PRN
Qty: 28 TABLET | Refills: 0 | Status: SHIPPED | OUTPATIENT
Start: 2022-03-09 | End: 2022-03-16

## 2022-03-09 RX ORDER — IBUPROFEN 400 MG/1
400 TABLET ORAL EVERY 6 HOURS PRN
Qty: 28 TABLET | Refills: 0 | Status: SHIPPED | OUTPATIENT
Start: 2022-03-09 | End: 2022-08-10

## 2022-03-09 ASSESSMENT — ENCOUNTER SYMPTOMS
NAUSEA: 0
WHEEZING: 0
VOMITING: 0
SHORTNESS OF BREATH: 0
PHOTOPHOBIA: 0
ABDOMINAL PAIN: 0
EYE REDNESS: 0

## 2022-03-09 ASSESSMENT — PAIN SCALES - GENERAL: PAINLEVEL_OUTOF10: 8

## 2022-03-10 NOTE — ED PROVIDER NOTES
ED Attending Attestation Note     Date of evaluation: 3/9/2022    This patient was seen by the resident. I have seen and examined the patient, agree with the workup, evaluation, management and diagnosis. The care plan has been discussed. My assessment reveals patient with calcaneal pain after injury yesterday. Xray negative. Will provide ACE and discharge home.      Leanne Peterson MD  03/09/22 [id number]

## 2022-03-10 NOTE — ED PROVIDER NOTES
obesity due to excess calories ([address] Severe persistent asthma, Vitamin D deficiency, and Xerosis cutis. She has a past surgical history that includes  section (). Her family history is not on file. She reports that she has never smoked. She has never used smokeless tobacco. She reports current alcohol use. She reports that she does not use drugs. Medications     Previous Medications    CALTRATE 600+D PLUS MINERALS (CALTRATE) 600-800 MG-UNIT TABS TABLET    Take 1 tablet by mouth 2 times daily Okay to give generic equivalent and if not covered direct patient to buy OTC. CANDESARTAN (ATACAND) 32 MG TABLET    Take 1 tablet by mouth daily    HYDROCHLOROTHIAZIDE (HYDRODIURIL) 25 MG TABLET    Take 1 tablet by mouth every morning    IPRATROPIUM (ATROVENT) 0.06 % NASAL SPRAY    2 sprays by Each Nostril route 4 times daily    PANTOPRAZOLE (PROTONIX) 40 MG TABLET    Take 1 tablet by mouth daily as needed (heart burn)    VITAMIN D3 (CHOLECALCIFEROL) 25 MCG (1000 UT) TABS TABLET    Take 2 tablets by mouth daily       Allergies     She is allergic to chloroquine, lisinopril, mefloquine, and quinolones. Physical Exam     INITIAL VITALS: BP: (!) 144/64, Temp: 98.4 °F (36.9 °C), Pulse: 75, Resp: 15, SpO2: 100 %   Physical Exam  Constitutional:       Appearance: Normal appearance. HENT:      Head: Normocephalic and atraumatic. Mouth/Throat:      Mouth: Mucous membranes are moist.   Eyes:      Extraocular Movements: Extraocular movements intact. Conjunctiva/sclera: Conjunctivae normal.   Cardiovascular:      Rate and Rhythm: Normal rate and regular rhythm. Pulmonary:      Effort: Pulmonary effort is normal.      Breath sounds: Normal breath sounds. Abdominal:      Palpations: Abdomen is soft. Tenderness: There is no abdominal tenderness. There is no guarding or rebound. Musculoskeletal:      Cervical back: Normal range of motion and neck supple. Comments: 2+ DP pulses bilaterally. Left foot with pinpoint tenderness palpation on the posterior calcaneus with no gross deformity appreciated. There is no overlying wounds. Patient is neurovascular intact in the left lower extremity. There is no tenderness palpation along the left thigh, knee, tip/fib, or ankle. Drink 5/5 hip flexion/extension, knee flexion/extension, plantar flexion and dorsiflexion. Sharif's test intact. Neurological:      General: No focal deficit present. Mental Status: She is alert and oriented to person, place, and time. Psychiatric:         Mood and Affect: Mood normal.         Behavior: Behavior normal.         DiagnosticResults     EKG   None     RADIOLOGY:  XR CALCANEUS LEFT (MIN 2 VIEWS)   Final Result      1. No acute osseous findings. 2.  Evidence of Achilles tendinopathy and enthesopathy. LABS:   No results found for this visit on 03/09/22. ED BEDSIDE ULTRASOUND:  None     RECENT VITALS:  BP: (!) 144/64, Temp: 98.4 °F (36.9 °C), Pulse: 75,Resp: 15, SpO2: 100 %     Procedures     None     ED Course     Nursing Notes, Past Medical Hx, Past Surgical Hx, Social Hx, Allergies, and Family Hx were reviewed. The patient was given the followingmedications:  Orders Placed This Encounter   Medications    ibuprofen (ADVIL;MOTRIN) tablet 400 mg    acetaminophen (TYLENOL) tablet 650 mg    lidocaine 4 % external patch 1 patch       CONSULTS:  None    MEDICAL DECISION MAKING / ASSESSMENT / Norberto Wai is a 63 y.o. female presents the emerge department with left foot pain that occurred after she stepped on the wheel of a chair. On arrival, she was slightly hypertensive but otherwise had reassuring vital signs. On examination, she had pinpoint tenderness palpation on the posterior aspect of her calcaneus. She had no gross deformities and was neurovascular intact.   Plain films of the left calcaneus showed no acute osseous findings but did show evidence of Achilles tendinopathy and

## 2022-03-22 ENCOUNTER — TELEPHONE (OUTPATIENT)
Dept: PRIMARY CARE CLINIC | Age: 62
End: 2022-03-22

## 2022-03-29 ENCOUNTER — OFFICE VISIT (OUTPATIENT)
Dept: ORTHOPEDIC SURGERY | Age: 62
End: 2022-03-29
Payer: COMMERCIAL

## 2022-03-29 VITALS — HEIGHT: 64 IN | TEMPERATURE: 97.3 F | WEIGHT: 248 LBS | BODY MASS INDEX: 42.34 KG/M2

## 2022-03-29 DIAGNOSIS — M17.12 PRIMARY OSTEOARTHRITIS OF LEFT KNEE: Primary | ICD-10-CM

## 2022-03-29 DIAGNOSIS — S90.32XA CONTUSION OF FOOT OR HEEL, LEFT, INITIAL ENCOUNTER: ICD-10-CM

## 2022-03-29 PROCEDURE — 99213 OFFICE O/P EST LOW 20 MIN: CPT | Performed by: ORTHOPAEDIC SURGERY

## 2022-03-30 DIAGNOSIS — M17.11 PRIMARY OSTEOARTHRITIS OF RIGHT KNEE: Primary | ICD-10-CM

## 2022-03-30 DIAGNOSIS — M25.562 LEFT KNEE PAIN, UNSPECIFIED CHRONICITY: ICD-10-CM

## 2022-03-30 NOTE — PROGRESS NOTES
[address] [facility]  History and Physical      Date:  3/29/2022    Name:  Debbie Ham  Address:  [address] [address]    :  1960      Age:   63 y.o. Chief Complaint    Follow-up (L knee and ankle followup)      History of Present Illness:  Debbie Ham is a 63 y.o. female who presents for an evaluation of her left knee and ankle pain. This patient is well-known to [facility] and is being treated with conservative therapy for the arthritis in both of her knees. This conservative treatment includes: rest, ice, elevation, home exercises, activity modification, NSAIDs as needed, corticosteroid injections and visco supplementation injections. The patient presents today due to persistent left knee pain and new onset left ankle pain. The patient states that the left knee pain is consistent with her baseline osteoarthritic pain. Its been several months since she has had any cortisone or viscosupplementation injection in the left knee. She does attest to new onset mechanical symptoms and sensations of partial giving way in the left knee. On her last visit we were considering getting an MRI to evaluate for possible meniscus tear however the patient declined at that time. Patient does note new injury she sustained to the calcaneus in the left foot. She states that several weeks ago she was attempting it up from a computer chair when her left heel hit one of the wheels of the chair. She noted pain over the insertion point of the Achilles tendon. She was assessed in the ER with x-rays that noted an osteophyte over the calcaneus and Achilles tendon insertion but otherwise were unremarkable for any acute injury. She notes pain over the Achilles tendon insertion/location of the osteophyte with weightbearing and palpation. She has not been ambulating with crutches or utilizing a walking boot.   She describes the pain as dull and achy.        History from the patient's last visit on 2021:  Gray Duque is a 63 y.o. female who presents for follow-up evaluation and viscosupplementation injection in the right knee. This patient is being treated with conservative therapy for the arthritis in her right knee. This conservative therapy includes: rest, ice, elevation, home exercises, activity modification, NSAIDs as needed, corticosteroid injections and visco supplementation injections. Patient continues to note persistent achy right knee pain. She does note that over the past week she has developed left knee pain after suffering a contusion to the left knee when she accidentally ran it into her car door. Since then she has been experiencing sharp lateral joint line pain. She does note occasional catching her left knee. She denies sensations of instability. She was seen in the ER for her left knee pain but had an unremarkable work-up. The patient denies any numbness, paresthesias, or weakness. Pain Assessment  Location of Pain: Knee  Location Modifiers: Left  Severity of Pain: 8  Quality of Pain: Sharp,Aching  Duration of Pain: Persistent  Frequency of Pain: Constant  Aggravating Factors: Standing,Walking,Stairs  Relieving Factors: Nsaids  Result of Injury: No  Work-Related Injury: No  Are there other pain locations you wish to document?: No    Past Medical History:   Diagnosis Date    GERD (gastroesophageal reflux disease)     Gestational diabetes     Hypertension     Morbid obesity due to excess calories ([facility] [address] 2016    Severe persistent asthma 2019    Vitamin D deficiency     Xerosis cutis         Past Surgical History:   Procedure Laterality Date     SECTION  2004       History reviewed. No pertinent family history.     Social History     Socioeconomic History    Marital status:      Spouse name: None    Number of children: None    Years of education: None    Highest education level: None Occupational History    None   Tobacco Use    Smoking status: Never Smoker    Smokeless tobacco: Never Used   Vaping Use    Vaping Use: Never used   Substance and Sexual Activity    Alcohol use: Yes     Alcohol/week: 0.0 standard drinks     Comment: rare    Drug use: No    Sexual activity: Yes     Partners: Male   Other Topics Concern    None   Social History Narrative    None     Social Determinants of Health     Financial Resource Strain: Low Risk     Difficulty of Paying Living Expenses: Not hard at all   Food Insecurity: No Food Insecurity    Worried About Running Out of Food in the Last Year: Never true    [address] in the Last Year: Never true   Transportation Needs:     Lack of Transportation (Medical): Not on file    Lack of Transportation (Non-Medical):  Not on file   Physical Activity:     Days of Exercise per Week: Not on file    Minutes of Exercise per Session: Not on file   Stress:     Feeling of Stress : Not on file   Social Connections:     Frequency of Communication with Friends and Family: Not on file    Frequency of Social Gatherings with Friends and Family: Not on file    Attends Buddhist Services: Not on file    Active Member of [address] [place] [address] or Organizations: Not on file    Attends Club or Organization Meetings: Not on file    Marital Status: Not on file   Intimate Partner Violence:     Fear of Current or Ex-Partner: Not on file    Emotionally Abused: Not on file    Physically Abused: Not on file    Sexually Abused: Not on file   Housing Stability:     Unable to Pay for Housing in the Last Year: Not on file    Number of [place] in the Last Year: Not on file    Unstable Housing in the Last Year: Not on file       Current Outpatient Medications   Medication Sig Dispense Refill    ibuprofen (ADVIL;MOTRIN) 400 MG tablet Take 1 tablet by mouth every 6 hours as needed for Pain 28 tablet 0    acetaminophen (TYLENOL) 500 MG tablet Take 1 tablet by mouth every 6 hours as needed for Pain 28 tablet 0    lidocaine (LIDODERM) 5 % Place 1 patch onto the skin daily 12 hours on, 12 hours off. 30 patch 0    ipratropium (ATROVENT) 0.06 % nasal spray 2 sprays by Each Nostril route 4 times daily 15 mL 5    pantoprazole (PROTONIX) 40 MG tablet Take 1 tablet by mouth daily as needed (heart burn) 90 tablet 1    candesartan (ATACAND) 32 MG tablet Take 1 tablet by mouth daily 90 tablet 4    hydroCHLOROthiazide (HYDRODIURIL) 25 MG tablet Take 1 tablet by mouth every morning 90 tablet 5    Caltrate 600+D Plus Minerals (CALTRATE) 600-800 MG-UNIT TABS tablet Take 1 tablet by mouth 2 times daily Okay to give generic equivalent and if not covered direct patient to buy OTC. 60 tablet 11    vitamin D3 (CHOLECALCIFEROL) 25 MCG (1000 UT) TABS tablet Take 2 tablets by mouth daily 60 tablet 11     No current facility-administered medications for this visit. Allergies   Allergen Reactions    Chloroquine     Lisinopril      Coughing and choking    Mefloquine     Quinolones          Review of Systems:  A 14 point review of systems was completed by the patient and is available in the media section of the scanned medical record and was reviewed. Vital Signs:   Temp 97.3 °F (36.3 °C) (Infrared)   Ht 5' 4\" (1.626 m)   Wt 248 lb (112.5 kg)   LMP 10/10/2014   BMI 42.57 kg/m²     General Exam:    General: Da Camacho is a healthy and well appearing 63y.o. year old female who is sitting comfortably in our office in no acute distress. Neuro: Alert & Oriented x 3,  normal,  no focal deficits noted. Normal mood & affect. Eyes: Sclera clear  Ears: Normal external ear  Mouth: Patient wearing a mask  Pulm: Respirations unlabored and regular   Skin: Warm, well perfused      Knee Examination: Left    Inspection: No effusion, ecchymosis, discoloration, erythema, excessive warmth. no gross deformities, no signs of infection.      Palpation: There is patellofemoral crepitus, there is medial and lateral joint line tenderness. No other osseous or soft tissue tenderness around the knee. Negative calf tenderness    Range of Motion:   0-120 degrees without pain or difficulty    Strength: Grossly intact    Special Tests: No ligament instability, valgus and varus stress test are stable at 0 and 30°. Negative Homans sign. Positive Nikki's. Gait: antalgic, without the use of assistive devices    Alignment: Varus deformity    Neuro: Sensation equal bilateral lower extremities    Vascular: 2+ posterior tibialis pulse      Foot/ankle examination: Left    Inspection:  No effusion, ecchymosis, discoloration, erythema, excessive warmth. no gross deformities, no signs of infection. Palpation: Tenderness to palpation over the Achilles tendon insertion/location of the osteophyte on the calcaneus. Palpable intact Achilles tendon without tenderness. No other osseous or soft tissue tenderness    Range of Motion: Full pain-free range of motion with regards to dorsiflexion, plantarflexion, inversion and eversion. Strength: Grossly intact in all directions    Special Tests:  No ligament instability, negative anterior drawer. Negative talar tilt. Gait: antalgic, without the use of assistive devices    Neuro: Sensation equal bilateral lower extremities    Vascular: 2+ posterior tibialis pulse          Radiology:   Previously obtain and outside imaging reviewed. No new images obtained. Imaging of the ankle and knee previously reviewed was unremarkable for acute injury. No fractures or dislocations noted. Assessment: Patient is a 63 y.o. female presents to the office for an evaluation of acute on chronic left knee pain and evaluation of her left ankle. Patient has a working diagnosis of pre-existing osteoarthritis in the left knee with possible medial meniscus tear as well as contusion to the left heel. Encounter Diagnoses   Name Primary?     Primary osteoarthritis of left knee Yes    Contusion of foot or heel, left, initial encounter        No orders of the defined types were placed in this encounter. Medical decision making:  Patient's workup and evaluation were reviewed with the patient in the office today. Imaging reviewed with the patient. Given patient's history and physical exam I believe the reasonable degree of medical certainty the patient may have a left medial meniscus tear. For this reason we would like to get a MRI of the left knee without contrast to evaluate for this pathology. The patient was otherwise educated on conservative therapy for her condition as detailed in the treatment plan below. All the patient's questions were answered while in the clinic. The patient is understanding of all instructions and agrees with the plan. Treatment Plan:    1. MRI of the left knee without contrast  2. Walking boot for the left lower extremity  3. Activity modification/Rest   4. Ice 20 minutes ever 1-2 hours PRN  5. Take medications as prescribed/instructed  6. Elevation   7. Lightweight exercise/low impact exercise  8. Appropriate diet/weight management      Follow up: After seeing an MRI and as needed    This patient exhibits moderate complexity for obtaining an independent history, reviewing past medical records, independent interpretation/review of diagnostic imaging, and further coordinating care. The patient exhibits low risk given that the patient is being treated with conservative therapy. Approximately 30 minutes were spent reviewing past medical records, imaging, educating the patient, and coordinating care. Demetrio Lucio PA-C    Physician Assistant - Certified    [birth year]/21[birth year]61 8:01 AM    During this examination, Yahir POWELL [place], functioned as a scribe for Dr. Mejia Wagoner. This dictation was performed with a verbal recognition program (DRAGON) and it was checked for errors.   It is possible that there are still dictated errors within this office note. If so, please bring any errors to my attention for an addendum. All efforts were made to ensure that this office note is accurate. This dictation was performed with a verbal recognition program (DRAGON) and it was checked for errors. It is possible that there are still dictated errors within this office note. If so, please bring any errors to my attention for an addendum. All efforts were made to ensure that this office note is accurate. Supervising Physician Attestation:  I, Dr. Sagrario Lewis, personally performed the services described in this documentation as scribed above, and it is both accurate and complete and I agree with all pertinent clinical information. I personally interviewed the patient and performed a physical examination and medical decision making. I discussed the patient's condition and treatment options and have  reviewed and agree with the past medical, family and social history unless otherwise noted. All of the patient's questions were answered.       Board Certified Orthopaedic Surgeon  [facility] and [address] [place] [address]  [facility] and [address] [facility] [employer]  Professor of [address]

## 2022-05-16 ENCOUNTER — HOSPITAL ENCOUNTER (OUTPATIENT)
Dept: PHYSICAL THERAPY | Age: 62
Setting detail: THERAPIES SERIES
Discharge: HOME | End: 2022-05-16

## 2022-08-04 DIAGNOSIS — K21.9 GASTROESOPHAGEAL REFLUX DISEASE WITHOUT ESOPHAGITIS: ICD-10-CM

## 2022-08-04 DIAGNOSIS — I10 ESSENTIAL HYPERTENSION: ICD-10-CM

## 2022-08-05 RX ORDER — PANTOPRAZOLE SODIUM 40 MG/1
TABLET, DELAYED RELEASE ORAL
Qty: 90 TABLET | Refills: 1 | Status: SHIPPED | OUTPATIENT
Start: 2022-08-05

## 2022-08-05 RX ORDER — CANDESARTAN 32 MG/1
TABLET ORAL
Qty: 90 TABLET | Refills: 1 | Status: SHIPPED | OUTPATIENT
Start: 2022-08-05

## 2022-08-05 RX ORDER — HYDROCHLOROTHIAZIDE 25 MG/1
25 TABLET ORAL EVERY MORNING
Qty: 90 TABLET | Refills: 1 | Status: SHIPPED | OUTPATIENT
Start: 2022-08-05

## 2022-08-05 NOTE — TELEPHONE ENCOUNTER
Medication:   Requested Prescriptions     Pending Prescriptions Disp Refills    pantoprazole (PROTONIX) 40 MG tablet [Pharmacy Med Name: PANTOPRAZOLE SOD DR 40 MG TAB] 90 tablet 1     Sig: TAKE 1 TABLET BY MOUTH EVERY MORNING BEFORE BREAKFAST    candesartan (ATACAND) 32 MG tablet [Pharmacy Med Name: CANDESARTAN CILEXETIL 32 MG TB] 90 tablet 1     Sig: TAKE 1 TABLET BY MOUTH EVERY DAY    hydroCHLOROthiazide (HYDRODIURIL) 25 MG tablet [Pharmacy Med Name: HYDROCHLOROTHIAZIDE 25 MG TAB] 90 tablet 1     Sig: TAKE 1 TABLET BY MOUTH EVERY MORNING        Last Filled:      Patient Phone Number: [phone] (home) [phone] (work)    Last appt: 12/17/2021   Next appt: Visit date not found    Last OARRS: No flowsheet data found.

## 2022-08-10 ENCOUNTER — APPOINTMENT (OUTPATIENT)
Dept: GENERAL RADIOLOGY | Age: 62
End: 2022-08-10
Payer: COMMERCIAL

## 2022-08-10 ENCOUNTER — APPOINTMENT (OUTPATIENT)
Dept: CT IMAGING | Age: 62
End: 2022-08-10
Payer: COMMERCIAL

## 2022-08-10 ENCOUNTER — HOSPITAL ENCOUNTER (EMERGENCY)
Age: 62
Discharge: HOME | End: 2022-08-10
Attending: EMERGENCY MEDICINE
Payer: COMMERCIAL

## 2022-08-10 VITALS
SYSTOLIC BLOOD PRESSURE: 156 MMHG | HEIGHT: 64 IN | HEART RATE: 64 BPM | BODY MASS INDEX: 42.49 KG/M2 | TEMPERATURE: 97.6 F | DIASTOLIC BLOOD PRESSURE: 111 MMHG | RESPIRATION RATE: 18 BRPM | OXYGEN SATURATION: 97 % | WEIGHT: 248.9 LBS

## 2022-08-10 DIAGNOSIS — R07.89 LEFT-SIDED CHEST WALL PAIN: Primary | ICD-10-CM

## 2022-08-10 LAB
ANION GAP SERPL CALCULATED.3IONS-SCNC: 15 MMOL/L (ref 3–16)
BASOPHILS ABSOLUTE: 0.1 K/UL (ref 0–0.2)
BASOPHILS RELATIVE PERCENT: 2.2 %
BILIRUBIN URINE: NEGATIVE
BLOOD, URINE: NEGATIVE
BUN BLDV-MCNC: 16 MG/DL (ref 7–20)
CALCIUM SERPL-MCNC: 9.3 MG/DL (ref 8.3–10.6)
CHLORIDE BLD-SCNC: 102 MMOL/L (ref 99–110)
CLARITY: CLEAR
CO2: 23 MMOL/L (ref 21–32)
COLOR: YELLOW
CREAT SERPL-MCNC: 0.9 MG/DL (ref 0.6–1.2)
D DIMER: 0.81 UG/ML FEU (ref 0–0.6)
EOSINOPHILS ABSOLUTE: 0.3 K/UL (ref 0–0.6)
EOSINOPHILS RELATIVE PERCENT: 4.8 %
EPITHELIAL CELLS, UA: NORMAL /HPF (ref 0–5)
GFR AFRICAN AMERICAN: >60
GFR NON-AFRICAN AMERICAN: >60
GLUCOSE BLD-MCNC: 86 MG/DL (ref 70–99)
GLUCOSE URINE: NEGATIVE MG/DL
HCT VFR BLD CALC: 39.4 % (ref 36–48)
HEMOGLOBIN: 13.2 G/DL (ref 12–16)
KETONES, URINE: NEGATIVE MG/DL
LEUKOCYTE ESTERASE, URINE: NEGATIVE
LYMPHOCYTES ABSOLUTE: 2 K/UL (ref 1–5.1)
LYMPHOCYTES RELATIVE PERCENT: 35.9 %
MCH RBC QN AUTO: 28.5 PG (ref 26–34)
MCHC RBC AUTO-ENTMCNC: 33.5 G/DL (ref 31–36)
MCV RBC AUTO: 85.2 FL (ref 80–100)
MICROSCOPIC EXAMINATION: NORMAL
MONOCYTES ABSOLUTE: 0.5 K/UL (ref 0–1.3)
MONOCYTES RELATIVE PERCENT: 9 %
NEUTROPHILS ABSOLUTE: 2.7 K/UL (ref 1.7–7.7)
NEUTROPHILS RELATIVE PERCENT: 48.1 %
NITRITE, URINE: NEGATIVE
PDW BLD-RTO: 14.4 % (ref 12.4–15.4)
PH UA: 6 (ref 5–8)
PLATELET # BLD: 236 K/UL (ref 135–450)
PMV BLD AUTO: 8.4 FL (ref 5–10.5)
POTASSIUM SERPL-SCNC: 4 MMOL/L (ref 3.5–5.1)
PROTEIN UA: NEGATIVE MG/DL
RBC # BLD: 4.63 M/UL (ref 4–5.2)
RBC UA: NORMAL /HPF (ref 0–4)
SODIUM BLD-SCNC: 140 MMOL/L (ref 136–145)
SPECIFIC GRAVITY UA: 1.02 (ref 1–1.03)
TROPONIN: <0.01 NG/ML
URINE TYPE: NORMAL
UROBILINOGEN, URINE: 0.2 E.U./DL
WBC # BLD: 5.5 K/UL (ref 4–11)
WBC UA: NORMAL /HPF (ref 0–5)

## 2022-08-10 PROCEDURE — 85025 COMPLETE CBC W/AUTO DIFF WBC: CPT

## 2022-08-10 PROCEDURE — 71046 X-RAY EXAM CHEST 2 VIEWS: CPT

## 2022-08-10 PROCEDURE — 81001 URINALYSIS AUTO W/SCOPE: CPT

## 2022-08-10 PROCEDURE — 99285 EMERGENCY DEPT VISIT HI MDM: CPT

## 2022-08-10 PROCEDURE — 6370000000 HC RX 637 (ALT 250 FOR IP): Performed by: PHYSICIAN ASSISTANT

## 2022-08-10 PROCEDURE — 36415 COLL VENOUS BLD VENIPUNCTURE: CPT

## 2022-08-10 PROCEDURE — 80048 BASIC METABOLIC PNL TOTAL CA: CPT

## 2022-08-10 PROCEDURE — 6360000004 HC RX CONTRAST MEDICATION: Performed by: PHYSICIAN ASSISTANT

## 2022-08-10 PROCEDURE — 84484 ASSAY OF TROPONIN QUANT: CPT

## 2022-08-10 PROCEDURE — 71260 CT THORAX DX C+: CPT | Performed by: PHYSICIAN ASSISTANT

## 2022-08-10 PROCEDURE — 85379 FIBRIN DEGRADATION QUANT: CPT

## 2022-08-10 PROCEDURE — 96374 THER/PROPH/DIAG INJ IV PUSH: CPT

## 2022-08-10 PROCEDURE — 93005 ELECTROCARDIOGRAM TRACING: CPT | Performed by: PHYSICIAN ASSISTANT

## 2022-08-10 PROCEDURE — 6360000002 HC RX W HCPCS: Performed by: PHYSICIAN ASSISTANT

## 2022-08-10 RX ORDER — HYDROCODONE BITARTRATE AND ACETAMINOPHEN 5; 325 MG/1; MG/1
1 TABLET ORAL EVERY 8 HOURS PRN
Qty: 4 TABLET | Refills: 0 | Status: SHIPPED | OUTPATIENT
Start: 2022-08-10 | End: 2022-08-12

## 2022-08-10 RX ORDER — LIDOCAINE 50 MG/G
1 PATCH TOPICAL DAILY
Qty: 10 PATCH | Refills: 0 | Status: SHIPPED | OUTPATIENT
Start: 2022-08-10 | End: 2022-08-20

## 2022-08-10 RX ORDER — KETOROLAC TROMETHAMINE 30 MG/ML
15 INJECTION, SOLUTION INTRAMUSCULAR; INTRAVENOUS ONCE
Status: COMPLETED | OUTPATIENT
Start: 2022-08-10 | End: 2022-08-10

## 2022-08-10 RX ORDER — IBUPROFEN 600 MG/1
600 TABLET ORAL EVERY 6 HOURS PRN
Qty: 30 TABLET | Refills: 0 | Status: SHIPPED | OUTPATIENT
Start: 2022-08-10

## 2022-08-10 RX ORDER — HYDROCODONE BITARTRATE AND ACETAMINOPHEN 5; 325 MG/1; MG/1
1 TABLET ORAL ONCE
Status: DISCONTINUED | OUTPATIENT
Start: 2022-08-10 | End: 2022-08-11 | Stop reason: HOSPADM

## 2022-08-10 RX ORDER — METHOCARBAMOL 500 MG/1
1000 TABLET, FILM COATED ORAL 3 TIMES DAILY PRN
Qty: 30 TABLET | Refills: 0 | Status: SHIPPED | OUTPATIENT
Start: 2022-08-10 | End: 2022-08-17

## 2022-08-10 RX ORDER — METHOCARBAMOL 500 MG/1
1000 TABLET, FILM COATED ORAL ONCE
Status: COMPLETED | OUTPATIENT
Start: 2022-08-10 | End: 2022-08-10

## 2022-08-10 RX ORDER — LIDOCAINE 4 G/G
1 PATCH TOPICAL ONCE
Status: DISCONTINUED | OUTPATIENT
Start: 2022-08-10 | End: 2022-08-11 | Stop reason: HOSPADM

## 2022-08-10 RX ORDER — KETOROLAC TROMETHAMINE 30 MG/ML
15 INJECTION, SOLUTION INTRAMUSCULAR; INTRAVENOUS ONCE
Status: DISCONTINUED | OUTPATIENT
Start: 2022-08-10 | End: 2022-08-10

## 2022-08-10 RX ADMIN — METHOCARBAMOL 1000 MG: 500 TABLET ORAL at 20:09

## 2022-08-10 RX ADMIN — IOPAMIDOL 75 ML: 755 INJECTION, SOLUTION INTRAVENOUS at 20:47

## 2022-08-10 RX ADMIN — KETOROLAC TROMETHAMINE 15 MG: 30 INJECTION, SOLUTION INTRAMUSCULAR at 20:09

## 2022-08-10 ASSESSMENT — ENCOUNTER SYMPTOMS
VOMITING: 0
CHEST TIGHTNESS: 0
SHORTNESS OF BREATH: 0
WHEEZING: 0
DIARRHEA: 0
RHINORRHEA: 0
COUGH: 0
CONSTIPATION: 0
NAUSEA: 0
EYES NEGATIVE: 1
ABDOMINAL PAIN: 0
FACIAL SWELLING: 0
BACK PAIN: 0

## 2022-08-10 ASSESSMENT — PAIN DESCRIPTION - LOCATION
LOCATION: FLANK
LOCATION: RIB CAGE

## 2022-08-10 ASSESSMENT — PAIN DESCRIPTION - FREQUENCY: FREQUENCY: CONTINUOUS

## 2022-08-10 ASSESSMENT — PAIN SCALES - GENERAL
PAINLEVEL_OUTOF10: 8
PAINLEVEL_OUTOF10: 1
PAINLEVEL_OUTOF10: 0
PAINLEVEL_OUTOF10: 6
PAINLEVEL_OUTOF10: 4

## 2022-08-10 ASSESSMENT — PAIN DESCRIPTION - ORIENTATION
ORIENTATION: LEFT
ORIENTATION: LEFT;OUTER
ORIENTATION: LEFT

## 2022-08-10 ASSESSMENT — PAIN - FUNCTIONAL ASSESSMENT
PAIN_FUNCTIONAL_ASSESSMENT: 0-10

## 2022-08-10 ASSESSMENT — PAIN DESCRIPTION - DESCRIPTORS: DESCRIPTORS: DULL

## 2022-08-10 ASSESSMENT — PAIN DESCRIPTION - PAIN TYPE
TYPE: ACUTE PAIN

## 2022-08-10 NOTE — ED PROVIDER NOTES
[address] ENCOUNTER          PHYSICIAN ASSISTANT NOTE       Date of evaluation: 8/10/2022    Chief Complaint     Spasms (Presents to ED with left sided spasms that occurred yesterday. States was going to lift some clothes while extending arm and had spasms on that left side and in upper back. Pt. Was concerned because had a spasm that lasted much longer than usual. Denies any recent n/v/d or changes in electrolytes. Denies any chest pain. )      History of Present Illness     Юлия Barahona is a 63 y.o. female with PMH HTN, GERD, mild intermittent asthma who presents to the ED with lateral left-sided rib pain/chest pain that began yesterday. Patient was holding laundry with her left arm and was reached over the arm of a chair, bumping her left side on the edge. Shortly after, she felt a sharp spasm-type pain along her left ribs that lasted about 30 seconds. Since then, she has had 3 episodes of this same spasm-like pain, with the last episode occurring overnight and lasting longer (about 1 minute). In between these episodes, she describes a milder dull pain to left chest. It is localized to the lateral left side along the lower ribs, non-radiating, worse with deep breath. She has not tried any OTC medications but reports using ice with some improvement. Denies fever/chills, N/V,SOB, dizziness, rashes. Patient denies history of blood clots, recent travel or recent surgery. Denies any leg pain or swelling. Denies any cough or hemoptysis. Review of Systems     Review of Systems   Constitutional:  Negative for chills and fever. HENT:  Negative for congestion, facial swelling, postnasal drip and rhinorrhea. Eyes: Negative. Negative for visual disturbance. Respiratory:  Negative for cough, chest tightness, shortness of breath and wheezing. Cardiovascular:  Positive for chest pain. Negative for palpitations and leg swelling.    Gastrointestinal:  Negative for abdominal pain, constipation, diarrhea, nausea and vomiting. Genitourinary:  Negative for dysuria, frequency, pelvic pain, urgency, vaginal bleeding and vaginal discharge. Musculoskeletal:  Negative for arthralgias, back pain and neck pain. Skin: Negative. Negative for rash. Neurological: Negative. Negative for dizziness, syncope, weakness and light-headedness. Hematological:  Negative for adenopathy. Psychiatric/Behavioral: Negative. All other systems reviewed and are negative. Past Medical, Surgical, Family, and Social History     She has a past medical history of GERD (gastroesophageal reflux disease), Gestational diabetes, Hypertension, Morbid obesity due to excess calories ([facility] [address] Severe persistent asthma, Vitamin D deficiency, and Xerosis cutis. She has a past surgical history that includes  section (). Her family history is not on file. She reports that she has never smoked. She has never used smokeless tobacco. She reports current alcohol use. She reports that she does not use drugs. Medications     Previous Medications    ACETAMINOPHEN (TYLENOL) 500 MG TABLET    Take 1 tablet by mouth every 6 hours as needed for Pain    CALTRATE 600+D PLUS MINERALS (CALTRATE) 600-800 MG-UNIT TABS TABLET    Take 1 tablet by mouth 2 times daily Okay to give generic equivalent and if not covered direct patient to buy OTC. CANDESARTAN (ATACAND) 32 MG TABLET    TAKE 1 TABLET BY MOUTH EVERY DAY    HYDROCHLOROTHIAZIDE (HYDRODIURIL) 25 MG TABLET    TAKE 1 TABLET BY MOUTH EVERY MORNING    IPRATROPIUM (ATROVENT) 0.06 % NASAL SPRAY    2 sprays by Each Nostril route 4 times daily    PANTOPRAZOLE (PROTONIX) 40 MG TABLET    TAKE 1 TABLET BY MOUTH EVERY MORNING BEFORE BREAKFAST    VITAMIN D3 (CHOLECALCIFEROL) 25 MCG (1000 UT) TABS TABLET    Take 2 tablets by mouth daily       Allergies     She is allergic to chloroquine, lisinopril, mefloquine, and quinolones.     Physical Exam     INITIAL VITALS: BP: (!) 184/110, Temp: 97.6 °F (36.4 °C), Heart Rate: 70, Resp: 18, SpO2: 100 %  Physical Exam  Vitals and nursing note reviewed. Constitutional:       General: She is not in acute distress. Appearance: Normal appearance. She is not diaphoretic. HENT:      Head: Normocephalic and atraumatic. Right Ear: External ear normal.      Left Ear: External ear normal.      Nose: Nose normal.      Mouth/Throat:      Pharynx: Oropharynx is clear. Eyes:      Conjunctiva/sclera: Conjunctivae normal.   Cardiovascular:      Rate and Rhythm: Normal rate and regular rhythm. Pulses: Normal pulses. Heart sounds: Normal heart sounds. No murmur heard. No friction rub. No gallop. Pulmonary:      Effort: Pulmonary effort is normal. No respiratory distress. Breath sounds: Normal breath sounds. Chest:      Chest wall: No tenderness. Abdominal:      General: Bowel sounds are normal.      Palpations: Abdomen is soft. Tenderness: no abdominal tenderness There is no right CVA tenderness, left CVA tenderness, guarding or rebound. Musculoskeletal:         General: No swelling or tenderness. Normal range of motion. Arms:       Cervical back: Normal range of motion and neck supple. Right lower leg: No edema. Left lower leg: No edema. Skin:     General: Skin is warm and dry. Findings: No rash. Neurological:      General: No focal deficit present. Mental Status: She is alert and oriented to person, place, and time. Cranial Nerves: No cranial nerve deficit. Sensory: No sensory deficit. Motor: No weakness.       Gait: Gait normal.       Diagnostic Results     EKG   Interpreted in conjunction with emergency department physician Akhil Go, *  EKG Interpretation    Rhythm: normal sinus   Rate: normal HR 64  Axis: normal  Ectopy: none  Conduction: normal  ST Segments: no acute change  T Waves: no acute change  Q Waves: none    Clinical Impression: no acute changes and normal EKG, compared to previous EKG from July 2021 with no change    RADIOLOGY:  CT CHEST PULMONARY EMBOLISM W CONTRAST   Final Result      1. No evidence of acute or chronic pulmonary embolus   2. Scarring and traction bronchiectasis medial basal segment of the right lower lobe         XR CHEST (2 VW)   Final Result   1. No acute cardiopulmonary abnormalities.              LABS:   Results for orders placed or performed during the hospital encounter of 08/10/22   CBC with Auto Differential   Result Value Ref Range    WBC 5.5 4.0 - 11.0 K/uL    RBC 4.63 4.00 - 5.20 M/uL    Hemoglobin 13.2 12.0 - 16.0 g/dL    Hematocrit 39.4 36.0 - 48.0 %    MCV 85.2 80.0 - 100.0 fL    MCH 28.5 26.0 - 34.0 pg    MCHC 33.5 31.0 - 36.0 g/dL    RDW 14.4 12.4 - 15.4 %    Platelets 518 067 - 417 K/uL    MPV 8.4 5.0 - 10.5 fL    Neutrophils % 48.1 %    Lymphocytes % 35.9 %    Monocytes % 9.0 %    Eosinophils % 4.8 %    Basophils % 2.2 %    Neutrophils Absolute 2.7 1.7 - 7.7 K/uL    Lymphocytes Absolute 2.0 1.0 - 5.1 K/uL    Monocytes Absolute 0.5 0.0 - 1.3 K/uL    Eosinophils Absolute 0.3 0.0 - 0.6 K/uL    Basophils Absolute 0.1 0.0 - 0.2 K/uL   BMP   Result Value Ref Range    Sodium 140 136 - 145 mmol/L    Potassium 4.0 3.5 - 5.1 mmol/L    Chloride 102 99 - 110 mmol/L    CO2 23 21 - 32 mmol/L    Anion Gap 15 3 - 16    Glucose 86 70 - 99 mg/dL    BUN 16 7 - 20 mg/dL    Creatinine 0.9 0.6 - 1.2 mg/dL    GFR Non-African American >60 >60    GFR African American >60 >60    Calcium 9.3 8.3 - 10.6 mg/dL   Troponin   Result Value Ref Range    Troponin <0.01 <0.01 ng/mL   Urinalysis with Microscopic   Result Value Ref Range    Color, UA Yellow Straw/Yellow    Clarity, UA Clear Clear    Glucose, Ur Negative Negative mg/dL    Bilirubin Urine Negative Negative    Ketones, Urine Negative Negative mg/dL    Specific Gravity, UA 1.020 1.005 - 1.030    Blood, Urine Negative Negative    pH, UA 6.0 5.0 - 8.0    Protein, UA Negative Negative mg/dL Urobilinogen, Urine 0.2 <2.0 E.U./dL    Nitrite, Urine Negative Negative    Leukocyte Esterase, Urine Negative Negative    Microscopic Examination Not Indicated     Urine Type Voided     WBC, UA 0-2 0 - 5 /HPF    RBC, UA None seen 0 - 4 /HPF    Epithelial Cells, UA 2-5 0 - 5 /HPF   D-Dimer, Quantitative   Result Value Ref Range    D-Dimer, Quant 0.81 (H) 0.00 - 0.60 ug/mL FEU         RECENT VITALS:  BP: (!) 156/111, Temp: 97.6 °F (36.4 °C), Heart Rate: 64, Resp: 18, SpO2: 97 %     Procedures       ED Course     Nursing Notes, Past Medical Hx,Past Surgical Hx, Social Hx, Allergies, and Family Hx were reviewed. ED Course as of 08/10/22 2201   Wed Aug 10, 2022   1935 XR CHEST (2 VW) [CS]   1941 XR CHEST (2 VW) [CS]   2133 CT CHEST PULMONARY EMBOLISM W CONTRAST [CS]      ED Course User Index  [CS] [place], [place]       The patient was given the following medications:  Orders Placed This Encounter   Medications    DISCONTD: ketorolac (TORADOL) injection 15 mg    methocarbamol (ROBAXIN) tablet 1,000 mg    lidocaine 4 % external patch 1 patch    HYDROcodone-acetaminophen (NORCO) 5-325 MG per tablet 1 tablet    ketorolac (TORADOL) injection 15 mg    iopamidol (ISOVUE-370) 76 % injection 75 mL    methocarbamol (ROBAXIN) 500 MG tablet     Sig: Take 2 tablets by mouth 3 times daily as needed (muscle spasm)     Dispense:  30 tablet     Refill:  0    ibuprofen (ADVIL;MOTRIN) 600 MG tablet     Sig: Take 1 tablet by mouth every 6 hours as needed for Pain     Dispense:  30 tablet     Refill:  0    lidocaine (LIDODERM) 5 %     Sig: Place 1 patch onto the skin in the morning for 10 days. 12 hours on, 12 hours off. .     Dispense:  10 patch     Refill:  0    HYDROcodone-acetaminophen (NORCO) 5-325 MG per tablet     Sig: Take 1 tablet by mouth every 8 hours as needed for Pain for up to 2 days. Intended supply: 3 days.  Take lowest dose possible to manage pain     Dispense:  4 tablet     Refill:  0 CONSULTS:  None    MEDICAL DECISION MAKING / ASSESSMENT / Demar Oleary is a 63 y.o. female with history of HTN and GERD who presents with left-sided lateral chest pain for the past day. This spasm-like pain occurred while lifting laundry with the left arm with continuing dull pain to the left chest.  Unable to reproduce with palpation but still likely muscle skeletal pain of chest wall. Due to her Hx of HTN, her age and BMI, and lack of reproducible pain on physical exam, the diagnosis of  ACS and PE were considered. Labs revealed normal CBP, BMP, and UA. Troponin was undetectable, CXR revealed no acute cardiopulmonary abnormalities. EKG shows normal sinus rhythm and no ischemic change compared to previous EKG from July 2021. BMP is within normal limits with creatinine 0.9 and glucose of 86. Urinalysis was negative. Troponin less than 0.01. D-dimer was mildly elevated at 0.81, so a CT chest was ordered. This revealed no evidence of acute or chronic pulmonary embolus. Due to the mechanism and location of injury around the time of symptom onset, pain is most likely due to a MSK strain. Patient given Toradol, Robaxin and Lidoderm patch as well as Norco for pain. She is feeling better at this time. Likely muscle skeletal chest wall pain. She will be prescribed Naprosyn, Robaxin and a few Norco for pain. She is to return if chest pain, shortness of breath, cough, fevers, worsening pain or symptoms. She can follow with PCP as needed. At this point patient stable for discharge. This patient was also evaluated by the attending physician. All care plans were discussed and agreed upon. Clinical Impression     1.  Left-sided chest wall pain        Disposition     PATIENT REFERRED TO:  MD Marce Lomeli 24  [address]  [phone]    Schedule an appointment as soon as possible for a visit       [facility] Emergency Department  [address] [place] [address] [address] [facility]  [phone]    If symptoms worsen    DISCHARGE MEDICATIONS:  New Prescriptions    HYDROCODONE-ACETAMINOPHEN (NORCO) 5-325 MG PER TABLET    Take 1 tablet by mouth every 8 hours as needed for Pain for up to 2 days. Intended supply: 3 days. Take lowest dose possible to manage pain    IBUPROFEN (ADVIL;MOTRIN) 600 MG TABLET    Take 1 tablet by mouth every 6 hours as needed for Pain    LIDOCAINE (LIDODERM) 5 %    Place 1 patch onto the skin in the morning for 10 days. 12 hours on, 12 hours off. .    METHOCARBAMOL (ROBAXIN) 500 MG TABLET    Take 2 tablets by mouth 3 times daily as needed (muscle spasm)       DISPOSITION Decision To Discharge 08/10/2022 09:58:43 PM       Uzma Heath  08/10/22 [id number]

## 2022-08-11 LAB
EKG ATRIAL RATE: 64 BPM
EKG DIAGNOSIS: NORMAL
EKG P AXIS: 29 DEGREES
EKG P-R INTERVAL: 154 MS
EKG Q-T INTERVAL: 416 MS
EKG QRS DURATION: 86 MS
EKG QTC CALCULATION (BAZETT): 429 MS
EKG R AXIS: 9 DEGREES
EKG T AXIS: 10 DEGREES
EKG VENTRICULAR RATE: 64 BPM

## 2022-08-11 NOTE — DISCHARGE INSTRUCTIONS
May alternate with ice and heat. May use Lidoderm patches over-the-counter. If chest pain, shortness of breath, coughing up blood, fevers, vomiting or worsening pain or symptoms return the emergency department.

## 2022-08-11 NOTE — ED PROVIDER NOTES
ED Attending Attestation Note     Date of evaluation: 8/10/2022    This patient was seen by the advance practice provider. I have seen and examined the patient, agree with the workup, evaluation, management and diagnosis. The care plan has been discussed. I have reviewed the ECG and concur with the VANESSA's interpretation. My assessment reveals presents with pain in the left chest, reproduced on palpation on the lateral aspect approximately the anterior axillary line. Work-up is essentially unremarkable, I suspect this be musculoskeletal pain. She is tender palpation of this area, but lungs are clear. Blaine Trujillo MD  08/10/22 [id number]

## 2022-12-05 ENCOUNTER — HOSPITAL ENCOUNTER (EMERGENCY)
Age: 62
Discharge: HOME | End: 2022-12-05
Attending: EMERGENCY MEDICINE
Payer: COMMERCIAL

## 2022-12-05 ENCOUNTER — APPOINTMENT (OUTPATIENT)
Dept: GENERAL RADIOLOGY | Age: 62
End: 2022-12-05
Payer: COMMERCIAL

## 2022-12-05 VITALS
RESPIRATION RATE: 16 BRPM | HEIGHT: 65 IN | OXYGEN SATURATION: 99 % | HEART RATE: 70 BPM | SYSTOLIC BLOOD PRESSURE: 170 MMHG | WEIGHT: 251 LBS | DIASTOLIC BLOOD PRESSURE: 88 MMHG | TEMPERATURE: 98.3 F | BODY MASS INDEX: 41.82 KG/M2

## 2022-12-05 DIAGNOSIS — S90.01XA CONTUSION OF RIGHT ANKLE, INITIAL ENCOUNTER: Primary | ICD-10-CM

## 2022-12-05 PROCEDURE — 6370000000 HC RX 637 (ALT 250 FOR IP): Performed by: EMERGENCY MEDICINE

## 2022-12-05 PROCEDURE — 73610 X-RAY EXAM OF ANKLE: CPT

## 2022-12-05 PROCEDURE — 99283 EMERGENCY DEPT VISIT LOW MDM: CPT

## 2022-12-05 RX ORDER — IBUPROFEN 800 MG/1
800 TABLET ORAL ONCE
Status: COMPLETED | OUTPATIENT
Start: 2022-12-05 | End: 2022-12-05

## 2022-12-05 RX ADMIN — IBUPROFEN 800 MG: 800 TABLET, FILM COATED ORAL at 21:03

## 2022-12-05 ASSESSMENT — PAIN SCALES - GENERAL: PAINLEVEL_OUTOF10: 6

## 2022-12-06 NOTE — ED PROVIDER NOTES
Emergency Physician Note        Note Open Time: 8:51 PM EST    Chief Complaint  Ankle Pain (Right/)       History of Present Illness  Юлия Barahona is a 63 y.o. female who presents to the ED for ankle pain. Patient reports over the weekend she was moving some dishes from the  to the counter and dropped a plate that struck the right fibula at the lateral malleolus and cause severe pain. The pain has been essentially unchanged since onset. It is worsened by weightbearing and palpation. She denies any other complaints. The pain is nonradiating. 10 systems reviewed, pertinent positives per HPI otherwise noted to be negative    I have reviewed the following from the nursing documentation:      Prior to Admission medications    Medication Sig Start Date End Date Taking?  Authorizing Provider   ibuprofen (ADVIL;MOTRIN) 600 MG tablet Take 1 tablet by mouth every 6 hours as needed for Pain 8/10/22   Dirk Felty, PA   pantoprazole (PROTONIX) 40 MG tablet TAKE 1 TABLET BY MOUTH EVERY MORNING BEFORE BREAKFAST 8/5/22   Elia Storm MD   candesartan (ATACAND) 32 MG tablet TAKE 1 TABLET BY MOUTH EVERY DAY 8/5/22   Elia Storm MD   hydroCHLOROthiazide (HYDRODIURIL) 25 MG tablet TAKE 1 TABLET BY MOUTH EVERY MORNING 8/5/22   Elia Strom MD   acetaminophen (TYLENOL) 500 MG tablet Take 1 tablet by mouth every 6 hours as needed for Pain 3/9/22 3/16/22  Marco Antonio Pleitez MD   ipratropium (ATROVENT) 0.06 % nasal spray 2 sprays by Each Nostril route 4 times daily 2/14/22   Jeremiah Harkins MD   Caltrate 600+D Plus Minerals (CALTRATE) 600-800 MG-UNIT TABS tablet Take 1 tablet by mouth 2 times daily Okay to give generic equivalent and if not covered direct patient to buy OTC. 12/17/21   Elia Storm MD   candesartan cilexetil-HCTZ (ATACAND HCT) 32-25 MG TABS Take 1 tablet by mouth daily 5/18/21 7/29/21  Elia Storm MD   vitamin D3 (CHOLECALCIFEROL) 25 MCG (1000 UT) TABS tablet Take 2 tablets by mouth daily 20   Ailin Cotton MD       Allergies as of 2022 - Fully Reviewed 08/10/2022   Allergen Reaction Noted    Chloroquine  10/31/2014    Lisinopril  10/31/2014    Mefloquine  2013    Quinolones  10/31/2014       Past Medical History:   Diagnosis Date    GERD (gastroesophageal reflux disease)     Gestational diabetes     Hypertension     Morbid obesity due to excess calories ([address] 2016    Severe persistent asthma 2019    Vitamin D deficiency     Xerosis cutis         Surgical History:   Past Surgical History:   Procedure Laterality Date     SECTION          Family History:  History reviewed. No pertinent family history. Social History     Socioeconomic History    Marital status:      Spouse name: Not on file    Number of children: Not on file    Years of education: Not on file    Highest education level: Not on file   Occupational History    Not on file   Tobacco Use    Smoking status: Never    Smokeless tobacco: Never   Vaping Use    Vaping Use: Never used   Substance and Sexual Activity    Alcohol use: Yes     Alcohol/week: 0.0 standard drinks     Comment: rare    Drug use: No    Sexual activity: Yes     Partners: Male   Other Topics Concern    Not on file   Social History Narrative    Not on file     Social Determinants of Health     Financial Resource Strain: Not on file   Food Insecurity: Not on file   Transportation Needs: Not on file   Physical Activity: Not on file   Stress: Not on file   Social Connections: Not on file   Intimate Partner Violence: Not on file   Housing Stability: Not on file       Nursing notes reviewed.     ED Triage Vitals [22]   Enc Vitals Group      BP (!) 183/76      Heart Rate 70      Resp 16      Temp 98.3 °F (36.8 °C)      Temp Source Oral      SpO2 98 %      Weight 251 lb (113.9 kg)      Height 5' 5\" (1.651 m)      Head Circumference       Peak Flow       Pain Score       Pain Loc       Pain Edu? Excl. in [address]? GENERAL:  Awake, alert. Well developed, well nourished with no apparent distress. HENT:  Normocephalic, Atraumatic, moist mucous membranes. EXTREMITIES:  Normal range of motion, no edema, tender over the right lateral malleolus, no other tenderness in the right lower extremity, neurovascular intact to the toes, slight edema in this area, no deformity, distal pulses present. SKIN: Warm, dry and intact. NEUROLOGIC: Normal mental status. Moving all extremities to command. RADIOLOGY  X-RAYS:  I have reviewed radiologic plain film image(s). ALL OTHER NON-PLAIN FILM IMAGES SUCH AS CT, ULTRASOUND AND MRI HAVE BEEN READ BY THE RADIOLOGIST. XR ANKLE RIGHT (MIN 3 VIEWS)   Final Result      No sign of any acute fracture or radiopaque foreign body. Enthesopathic plantar calcaneal spurring noted             MEDICAL DECISION MAKING      I advised the patient to return to the emergency department immediately for any new or worsening symptoms, such as increased pain or swelling. The patient voiced agreement and understanding of the treatment plan. No results found for this visit on 12/05/22. I estimate there is LOW risk for COMPARTMENT SYNDROME, DEEP VENOUS THROMBOSIS, SEPTIC ARTHRITIS, TENDON OR NEUROVASCULAR INJURY, thus I consider the discharge disposition reasonable. Юлия Barahona and I have discussed the diagnosis and risks, and we agree with discharging home to follow-up with their primary doctor or the referral orthopedist. We also discussed returning to the Emergency Department immediately if new or worsening symptoms occur. We have discussed the symptoms which are most concerning (e.g., changing or worsening pain, numbness, weakness) that necessitate immediate return. Final Impression    1. Contusion of right ankle, initial encounter        Blood pressure (!) 170/88, pulse 70, temperature 98.3 °F (36.8 °C), temperature source Oral, resp.  rate 16, height 5' 5\" (1.651 m), weight 251 lb (113.9 kg), last menstrual period 10/10/2014, SpO2 99 %, not currently breastfeeding. Patient was given scripts for the following medications. I counseled patient how to take these medications. Discharge Medication List as of 12/5/2022  9:03 PM          Disposition  Pt is in good condition upon Discharge to home. This chart was generated using the [address] [place] [address] [employer] system. I created this record but it may contain dictation errors.          Rima Winter MD  12/06/22 [id number]

## 2022-12-06 NOTE — ED TRIAGE NOTES
57y/o female presents to the ED with left ankle injury. Pt states Saturday and dinner plate fell onto her ankle.  +pain -swelling

## 2022-12-21 ENCOUNTER — OFFICE VISIT (OUTPATIENT)
Dept: PRIMARY CARE CLINIC | Age: 62
End: 2022-12-21
Payer: COMMERCIAL

## 2022-12-21 VITALS
WEIGHT: 246 LBS | BODY MASS INDEX: 40.98 KG/M2 | DIASTOLIC BLOOD PRESSURE: 86 MMHG | HEART RATE: 68 BPM | HEIGHT: 65 IN | OXYGEN SATURATION: 99 % | TEMPERATURE: 98.1 F | SYSTOLIC BLOOD PRESSURE: 147 MMHG

## 2022-12-21 DIAGNOSIS — R35.0 FREQUENCY OF MICTURITION: ICD-10-CM

## 2022-12-21 DIAGNOSIS — I10 ESSENTIAL HYPERTENSION: ICD-10-CM

## 2022-12-21 DIAGNOSIS — Z23 NEED FOR SHINGLES VACCINE: ICD-10-CM

## 2022-12-21 DIAGNOSIS — K21.9 GASTROESOPHAGEAL REFLUX DISEASE WITHOUT ESOPHAGITIS: ICD-10-CM

## 2022-12-21 DIAGNOSIS — Z23 HIGH PRIORITY FOR COVID-19 VACCINATION: ICD-10-CM

## 2022-12-21 DIAGNOSIS — Z00.00 PHYSICAL EXAM: Primary | ICD-10-CM

## 2022-12-21 DIAGNOSIS — Z00.00 PHYSICAL EXAM: ICD-10-CM

## 2022-12-21 DIAGNOSIS — R25.2 MUSCLE CRAMPS: ICD-10-CM

## 2022-12-21 DIAGNOSIS — Z12.4 SCREENING FOR CERVICAL CANCER: ICD-10-CM

## 2022-12-21 LAB
A/G RATIO: 1.4 (ref 1.1–2.2)
ALBUMIN SERPL-MCNC: 4.4 G/DL (ref 3.4–5)
ALP BLD-CCNC: 137 U/L (ref 40–129)
ALT SERPL-CCNC: 12 U/L (ref 10–40)
ANION GAP SERPL CALCULATED.3IONS-SCNC: 12 MMOL/L (ref 3–16)
AST SERPL-CCNC: 20 U/L (ref 15–37)
BASOPHILS ABSOLUTE: 0 K/UL (ref 0–0.2)
BASOPHILS RELATIVE PERCENT: 0.5 %
BILIRUB SERPL-MCNC: 0.3 MG/DL (ref 0–1)
BILIRUBIN URINE: NEGATIVE
BLOOD, URINE: NEGATIVE
BUN BLDV-MCNC: 17 MG/DL (ref 7–20)
CALCIUM SERPL-MCNC: 9.8 MG/DL (ref 8.3–10.6)
CHLORIDE BLD-SCNC: 102 MMOL/L (ref 99–110)
CHOLESTEROL, TOTAL: 225 MG/DL (ref 0–199)
CLARITY: CLEAR
CO2: 27 MMOL/L (ref 21–32)
COLOR: YELLOW
CREAT SERPL-MCNC: 0.9 MG/DL (ref 0.6–1.2)
EOSINOPHILS ABSOLUTE: 0.1 K/UL (ref 0–0.6)
EOSINOPHILS RELATIVE PERCENT: 1.6 %
GFR SERPL CREATININE-BSD FRML MDRD: >60 ML/MIN/{1.73_M2}
GLUCOSE BLD-MCNC: 102 MG/DL (ref 70–99)
GLUCOSE URINE: NEGATIVE MG/DL
HCT VFR BLD CALC: 44.1 % (ref 36–48)
HDLC SERPL-MCNC: 66 MG/DL (ref 40–60)
HEMOGLOBIN: 14.3 G/DL (ref 12–16)
KETONES, URINE: NEGATIVE MG/DL
LDL CHOLESTEROL CALCULATED: 147 MG/DL
LEUKOCYTE ESTERASE, URINE: NEGATIVE
LYMPHOCYTES ABSOLUTE: 1.4 K/UL (ref 1–5.1)
LYMPHOCYTES RELATIVE PERCENT: 28.2 %
MCH RBC QN AUTO: 27.8 PG (ref 26–34)
MCHC RBC AUTO-ENTMCNC: 32.3 G/DL (ref 31–36)
MCV RBC AUTO: 86 FL (ref 80–100)
MICROSCOPIC EXAMINATION: NORMAL
MONOCYTES ABSOLUTE: 0.5 K/UL (ref 0–1.3)
MONOCYTES RELATIVE PERCENT: 10.5 %
NEUTROPHILS ABSOLUTE: 2.9 K/UL (ref 1.7–7.7)
NEUTROPHILS RELATIVE PERCENT: 59.2 %
NITRITE, URINE: NEGATIVE
PDW BLD-RTO: 14.6 % (ref 12.4–15.4)
PH UA: 6.5 (ref 5–8)
PLATELET # BLD: 246 K/UL (ref 135–450)
PMV BLD AUTO: 9.5 FL (ref 5–10.5)
POTASSIUM SERPL-SCNC: 4.5 MMOL/L (ref 3.5–5.1)
PROTEIN UA: NEGATIVE MG/DL
RBC # BLD: 5.13 M/UL (ref 4–5.2)
SODIUM BLD-SCNC: 141 MMOL/L (ref 136–145)
SPECIFIC GRAVITY UA: 1.02 (ref 1–1.03)
TOTAL PROTEIN: 7.6 G/DL (ref 6.4–8.2)
TRIGL SERPL-MCNC: 58 MG/DL (ref 0–150)
TSH SERPL DL<=0.05 MIU/L-ACNC: 0.9 UIU/ML (ref 0.27–4.2)
URINE TYPE: NORMAL
UROBILINOGEN, URINE: 0.2 E.U./DL
VITAMIN D 25-HYDROXY: 27.2 NG/ML
VLDLC SERPL CALC-MCNC: 12 MG/DL
WBC # BLD: 5 K/UL (ref 4–11)

## 2022-12-21 PROCEDURE — 3074F SYST BP LT 130 MM HG: CPT | Performed by: INTERNAL MEDICINE

## 2022-12-21 PROCEDURE — 3078F DIAST BP <80 MM HG: CPT | Performed by: INTERNAL MEDICINE

## 2022-12-21 PROCEDURE — 99396 PREV VISIT EST AGE 40-64: CPT | Performed by: INTERNAL MEDICINE

## 2022-12-21 RX ORDER — AMLODIPINE BESYLATE 5 MG/1
5 TABLET ORAL DAILY
Qty: 30 TABLET | Refills: 3 | Status: SHIPPED | OUTPATIENT
Start: 2022-12-21

## 2022-12-21 RX ORDER — CANDESARTAN 32 MG/1
32 TABLET ORAL DAILY
Qty: 90 TABLET | Refills: 4 | Status: SHIPPED | OUTPATIENT
Start: 2022-12-21

## 2022-12-21 RX ORDER — CAL/D3/MAG11/ZINC/COP/MANG/BOR 600 MG-800
1 TABLET ORAL 2 TIMES DAILY
Qty: 60 TABLET | Refills: 11 | Status: SHIPPED | OUTPATIENT
Start: 2022-12-21

## 2022-12-21 RX ORDER — PANTOPRAZOLE SODIUM 40 MG/1
40 TABLET, DELAYED RELEASE ORAL DAILY PRN
Qty: 30 TABLET | Refills: 1 | Status: SHIPPED | OUTPATIENT
Start: 2022-12-21

## 2022-12-21 RX ORDER — FLUTICASONE FUROATE 200 UG/1
POWDER RESPIRATORY (INHALATION)
COMMUNITY
Start: 2022-11-07

## 2022-12-21 SDOH — ECONOMIC STABILITY: FOOD INSECURITY: WITHIN THE PAST 12 MONTHS, YOU WORRIED THAT YOUR FOOD WOULD RUN OUT BEFORE YOU GOT MONEY TO BUY MORE.: NEVER TRUE

## 2022-12-21 SDOH — ECONOMIC STABILITY: FOOD INSECURITY: WITHIN THE PAST 12 MONTHS, THE FOOD YOU BOUGHT JUST DIDN'T LAST AND YOU DIDN'T HAVE MONEY TO GET MORE.: NEVER TRUE

## 2022-12-21 ASSESSMENT — PATIENT HEALTH QUESTIONNAIRE - PHQ9
SUM OF ALL RESPONSES TO PHQ9 QUESTIONS 1 & 2: 0
SUM OF ALL RESPONSES TO PHQ QUESTIONS 1-9: 0
1. LITTLE INTEREST OR PLEASURE IN DOING THINGS: 0
SUM OF ALL RESPONSES TO PHQ QUESTIONS 1-9: 0
2. FEELING DOWN, DEPRESSED OR HOPELESS: 0

## 2022-12-21 ASSESSMENT — ENCOUNTER SYMPTOMS
GASTROINTESTINAL NEGATIVE: 1
WHEEZING: 0
DIARRHEA: 0
ABDOMINAL PAIN: 0
CHEST TIGHTNESS: 0
ABDOMINAL DISTENTION: 0
SHORTNESS OF BREATH: 0
BLOOD IN STOOL: 0
COUGH: 0
CONSTIPATION: 0
EYES NEGATIVE: 1

## 2022-12-21 ASSESSMENT — SOCIAL DETERMINANTS OF HEALTH (SDOH): HOW HARD IS IT FOR YOU TO PAY FOR THE VERY BASICS LIKE FOOD, HOUSING, MEDICAL CARE, AND HEATING?: NOT HARD AT ALL

## 2022-12-21 NOTE — PROGRESS NOTES
Subjective:      Patient ID: Crow Abdul is a 63 y.o. female. 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 Her prior echocardiogram from 3/2021 shows  demonstrates hyperdynamic systolic function with an EF of 65%, mild concentric LVH and no regional wall abnormalities or diastolic dysfunction. Last seen  VV 3/26/2021 Patient presents with:  Results: discuss results from 12/2020  Other: cramps in legs off and on merissa after lying on leather couch ? ?               4/30/2020 Patient presents with:  Lower Back Pain: just started a few hrs ago now feels spasms and extreme pain   Knee Pain: better with Celebrex . Also got a shot from Ortho a few hrs ago                 By VV4/22/2020 Patient presents with:  Pain: rt knee and thigh > 6 mths . Getting unbearable now     Has seen Ortho . nsaids not helping . Tried PT but no relief               12/30/2019   Dr Nick Cardenas Patient presents with: Annual Exam. Needs for Work              Review of Systems   Constitutional:  Positive for fatigue. Negative for activity change, appetite change, fever and unexpected weight change. Tdap 2015     Flu Vac 12/22    Pneum Vac 12/19    Shingrex   12/21 #1    Covid Vac 5/22 #4   HENT: Negative. Dental ex reg    Eyes: Negative. Negative for visual disturbance. Eye exam    scheduled 12/22   Respiratory:  Negative for cough, chest tightness, shortness of breath and wheezing. Does not smoke . Rare wine     Asthma  >>   Cardiovascular:         HTN  2009     No known CAD .  No FH either Fairly active    Gastrointestinal: Negative. Negative for abdominal distention, abdominal pain, blood in stool, constipation and diarrhea. No FH of Ca colon     Colonoscopy 2018    Endocrine:        No FH of diabetes    Genitourinary:  Positive for frequency and urgency. Negative for dysuria, menstrual problem and vaginal discharge. Natural menopause 2017     Last pelvic / pap 2017     One 16 yr old  . Gestational Diabetes     Mammogram 6/22   Musculoskeletal:  Positive for arthralgias (left ankle injury 12/22). S/P  IA injec/ knee 2021   better     H/O Torn Meniscus rt    Allergic/Immunologic: Positive for environmental allergies. Negative for food allergies. Neurological:  Negative for dizziness, weakness and headaches. Psychiatric/Behavioral:  Negative for behavioral problems, dysphoric mood and sleep disturbance. The patient is not nervous/anxious. Objective:   Physical Exam  Constitutional:       General: She is not in acute distress. Appearance: She is obese. Eyes:      Extraocular Movements: Extraocular movements intact. Conjunctiva/sclera: Conjunctivae normal.   Cardiovascular:      Rate and Rhythm: Regular rhythm. Heart sounds: Normal heart sounds. No murmur heard. Pulmonary:      Effort: Pulmonary effort is normal.      Breath sounds: No wheezing or rales. Abdominal:      General: There is distension. Musculoskeletal:         General: Normal range of motion. Cervical back: Neck supple. Right lower leg: No edema. Left lower leg: No edema. Skin:     General: Skin is warm. Neurological:      General: No focal deficit present. Mental Status: She is oriented to person, place, and time. Mental status is at baseline. Psychiatric:         Mood and Affect: Mood normal.         Behavior: Behavior normal.       Assessment:   Iris was seen today for annual exam and urinary frequency.     Diagnoses and all orders for this visit:    Physical exam  BMI > 40  Advised low carb diet + Intermittent Fasting   -     CBC with Auto Differential; Future  -     Comprehensive Metabolic Panel; Future  -     Hemoglobin A1C; Future  -     Lipid Panel; Future  -     TSH; Future  -     Urinalysis; Future  -     Vitamin D 25 Hydroxy; Future    High priority for COVID-19 vaccination  new  Bival vac at Pharm     Need for shingles vaccine  needs #2  -     zoster recombinant adjuvanted vaccine [facility]) 50 MCG/0.5ML SUSR injection; Inject 0.5 mLs into the muscle once for 1 dose    Essential hypertension  DC Hctz / freq . Adding Norvasc  in place  -     candesartan (ATACAND) 32 MG tablet; Take 1 tablet by mouth daily  -     amLODIPine (NORVASC) 5 MG tablet; Take 1 tablet by mouth daily    Frequency of micturition  DC Hctz and see if it improves   -     Hemoglobin A1C; Future  -     Urinalysis; Future    Screening for cervical cancer  -     Raj Fitzpatrick MD, Gynecology, [facility]    Gastroesophageal reflux disease without esophagitis  -     pantoprazole (PROTONIX) 40 MG tablet; Take 1 tablet by mouth daily as needed (GERD)    Muscle cramps  -     Caltrate 600+D Plus Minerals (CALTRATE) 600-800 MG-UNIT TABS tablet; Take 1 tablet by mouth 2 times daily Okay to give generic equivalent and if not covered direct patient to buy OTC.  -     Comprehensive Metabolic Panel;  Future              Plan:           Thi Cosme MD

## 2022-12-22 LAB
ESTIMATED AVERAGE GLUCOSE: 105.4 MG/DL
HBA1C MFR BLD: 5.3 %

## 2023-01-14 DIAGNOSIS — I10 ESSENTIAL HYPERTENSION: ICD-10-CM

## 2023-01-16 RX ORDER — AMLODIPINE BESYLATE 5 MG/1
TABLET ORAL
Qty: 30 TABLET | Refills: 3 | Status: SHIPPED | OUTPATIENT
Start: 2023-01-16

## 2023-01-16 NOTE — TELEPHONE ENCOUNTER
Medication:   Requested Prescriptions     Pending Prescriptions Disp Refills    amLODIPine (NORVASC) 5 MG tablet [Pharmacy Med Name: AMLODIPINE BESYLATE 5 MG TAB] 30 tablet 3     Sig: TAKE 1 TABLET BY MOUTH EVERY DAY        Last Filled:      Patient Phone Number: [phone] (home) [phone] (work)    Last appt: 12/21/2022   Next appt: Visit date not found    Last OARRS: No flowsheet data found.

## 2023-01-23 DIAGNOSIS — K21.9 GASTROESOPHAGEAL REFLUX DISEASE WITHOUT ESOPHAGITIS: ICD-10-CM

## 2023-01-23 NOTE — TELEPHONE ENCOUNTER
Medication:   Requested Prescriptions     Pending Prescriptions Disp Refills    pantoprazole (PROTONIX) 40 MG tablet [Pharmacy Med Name: PANTOPRAZOLE SOD DR 40 MG TAB] 90 tablet 1     Sig: TAKE 1 TABLET BY MOUTH EVERY DAY BEFORE BREAKFAST        Last Filled:      Patient Phone Number: [phone] (home) [phone] (work)    Last appt: 12/21/2022   Next appt: Visit date not found    Last OARRS: No flowsheet data found.

## 2023-01-24 RX ORDER — PANTOPRAZOLE SODIUM 40 MG/1
TABLET, DELAYED RELEASE ORAL
Qty: 90 TABLET | Refills: 1 | Status: SHIPPED | OUTPATIENT
Start: 2023-01-24

## 2023-02-02 DIAGNOSIS — I10 ESSENTIAL HYPERTENSION: ICD-10-CM

## 2023-02-02 NOTE — TELEPHONE ENCOUNTER
Medication:   Requested Prescriptions     Pending Prescriptions Disp Refills    hydroCHLOROthiazide (HYDRODIURIL) 25 MG tablet [Pharmacy Med Name: HYDROCHLOROTHIAZIDE 25 MG TAB] 90 tablet 1     Sig: TAKE 1 TABLET BY MOUTH EVERY DAY IN THE MORNING        Last Filled:      Patient Phone Number: [phone] (home) [phone] (work)    Last appt: 12/21/2022   Next appt: Visit date not found    Last OARRS: No flowsheet data found.

## 2023-02-03 RX ORDER — HYDROCHLOROTHIAZIDE 25 MG/1
TABLET ORAL
Qty: 90 TABLET | Refills: 1 | Status: SHIPPED | OUTPATIENT
Start: 2023-02-03

## 2023-02-06 ENCOUNTER — APPOINTMENT (OUTPATIENT)
Dept: GENERAL RADIOLOGY | Age: 63
End: 2023-02-06
Payer: COMMERCIAL

## 2023-02-06 ENCOUNTER — HOSPITAL ENCOUNTER (EMERGENCY)
Age: 63
Discharge: HOME | End: 2023-02-06
Payer: COMMERCIAL

## 2023-02-06 VITALS
TEMPERATURE: 98.3 F | WEIGHT: 256.06 LBS | BODY MASS INDEX: 42.66 KG/M2 | SYSTOLIC BLOOD PRESSURE: 171 MMHG | DIASTOLIC BLOOD PRESSURE: 70 MMHG | RESPIRATION RATE: 20 BRPM | HEIGHT: 65 IN | HEART RATE: 80 BPM | OXYGEN SATURATION: 98 %

## 2023-02-06 DIAGNOSIS — S89.91XA RIGHT KNEE INJURY, INITIAL ENCOUNTER: Primary | ICD-10-CM

## 2023-02-06 PROCEDURE — 99283 EMERGENCY DEPT VISIT LOW MDM: CPT

## 2023-02-06 PROCEDURE — 73560 X-RAY EXAM OF KNEE 1 OR 2: CPT

## 2023-02-06 NOTE — ED PROVIDER NOTES
[address]        Pt Name: Maria Del Carmen Chiu  MRN: [id number]  [place] 1960  Date of evaluation: 2023  Provider: Connor Winters PA-C  PCP: Kareem Abbasi MD  ED Attending: Paulino MENDEZ. I have evaluated this patient. My supervising physician was available for consultation. CHIEF COMPLAINT:     Chief Complaint   Patient presents with    Knee Injury     right       HISTORY OF PRESENT ILLNESS:      History provided by the patient. No limitations. Maria Del Carmen Chiu is a 63 y.o. female who arrives to the ED by private vehicle with a family member. Patient here with right knee pain after bumping the back of her right knee on a cabinet while doing laundry on Saturday, . Patient describes pain posteriorly. She describes pain worse with movement. No other injuries sustained. Nursing Notes were reviewed     REVIEW OF SYSTEMS:     Review of Systems  Positives and pertinent negatives as per HPI.       PAST MEDICAL HISTORY:     Past Medical History:   Diagnosis Date    GERD (gastroesophageal reflux disease)     Gestational diabetes     Hypertension     Morbid obesity due to excess calories ([address] 2016    Severe persistent asthma 2019    Vitamin D deficiency     Xerosis cutis        SURGICAL HISTORY:      Past Surgical History:   Procedure Laterality Date     SECTION         CURRENT MEDICATIONS:       Discharge Medication List as of 2023  7:17 PM        CONTINUE these medications which have NOT CHANGED    Details   hydroCHLOROthiazide (HYDRODIURIL) 25 MG tablet TAKE 1 TABLET BY MOUTH EVERY DAY IN THE MORNING, Disp-90 tablet, R-1Normal      pantoprazole (PROTONIX) 40 MG tablet TAKE 1 TABLET BY MOUTH EVERY DAY BEFORE BREAKFAST, Disp-90 tablet, R-1Normal      amLODIPine (NORVASC) 5 MG tablet TAKE 1 TABLET BY MOUTH EVERY DAY, Disp-30 tablet, R-3Normal      ARNUITY ELLIPTA 200 MCG/ACT AEPB INHALE 1 PUFF BY MOUTH DAILY, DAWHistorical Med      candesartan (ATACAND) 32 MG tablet Take 1 tablet by mouth daily, Disp-90 tablet, R-4Normal      Caltrate 600+D Plus Minerals (CALTRATE) 600-800 MG-UNIT TABS tablet Take 1 tablet by mouth 2 times daily Okay to give generic equivalent and if not covered direct patient to buy OTC., Disp-60 tablet, R-11Normal      acetaminophen (TYLENOL) 500 MG tablet Take 1 tablet by mouth every 6 hours as needed for Pain, Disp-28 tablet, R-0Normal      ipratropium (ATROVENT) 0.06 % nasal spray 2 sprays by Each Nostril route 4 times daily, Disp-15 mL, R-5Normal      vitamin D3 (CHOLECALCIFEROL) 25 MCG (1000 UT) TABS tablet Take 2 tablets by mouth daily, Disp-60 tablet, R-11Normal             ALLERGIES:    Chloroquine, Lisinopril, Mefloquine, and Quinolones    FAMILY HISTORY:     @FAMX    SOCIAL HISTORY:       Social History     Socioeconomic History    Marital status:      Spouse name: None    Number of children: None    Years of education: None    Highest education level: None   Tobacco Use    Smoking status: Never    Smokeless tobacco: Never   Vaping Use    Vaping Use: Never used   Substance and Sexual Activity    Alcohol use: Yes     Alcohol/week: 0.0 standard drinks     Comment: rare    Drug use: No    Sexual activity: Yes     Partners: Male     Social Determinants of Health     Financial Resource Strain: Low Risk     Difficulty of Paying Living Expenses: Not hard at all   Food Insecurity: No Food Insecurity    Worried About Running Out of Food in the Last Year: Never true    Ran Out of Food in the Last Year: Never true       SCREENINGS:    Paula Coma Scale  Eye Opening: Spontaneous  Best Verbal Response: Oriented  Best Motor Response: Obeys commands  Paula Coma Scale Score: 15      CIWA Assessment  BP: (!) 171/70  Heart Rate: 80        PHYSICAL EXAM:       ED Triage Vitals [02/06/23 1747]   BP Temp Temp Source Heart Rate Resp SpO2 Height Weight   (!) 171/70 98.3 °F (36.8 °C) Oral 80 20 98 % 5' 5\"  (1.651 m) 256 lb 1 oz (116.1 kg)       Physical Exam    CONSTITUTIONAL: Obese. Awake and alert. Cooperative. Well-developed. Well-nourished. Non-toxic. No acute distress. HENT: Normocephalic. Atraumatic. NECK: Supple. Normal ROM. PULMONARY/CHEST WALL: Effort normal. No tachypnea. MUSKULOSKELETAL: Right lower extremity: No acute deformity. Patient maintains good flexion and extension at her knee joint including intact extensor mechanism. There is tenderness posteriorly to palpate. No crepitus. No pitting edema. No pain anteriorly over the patella. No pain to the proximal fibula. No obvious ligament laxity or joint instability. SKIN: Warm and dry. No rash. NEUROLOGICAL: Alert and oriented x 3. PSYCHIATRIC: Normal affect      DIAGNOSTICRESULTS:     LABS:    None    RADIOLOGY:  All x-ray studies are viewed/reviewed by me. My interpretation: neg for acute fracture    Formal interpretations per the radiologist are as follows:    XR KNEE RIGHT (1-2 VIEWS)    Result Date: 2/6/2023  EXAM: XR KNEE RIGHT (1-2 VIEWS). DATE: 2/6/2023 6:16 PM. INDICATION: pain after bumping it 2 days ago COMPARISON: None TECHNIQUE: Standard per department protocol. FINDINGS: Moderate osteoarthrosis. No acute fracture or dislocation. No large joint effusion. Soft tissues unremarkable. Moderate osteoarthrosis without acute osseous abnormality. PROCEDURES:   N/A    CRITICAL CARE TIME:       None      CONSULTS:  None      EMERGENCY DEPARTMENT COURSE and DIFFERENTIAL DIAGNOSIS/MDM:   Vitals:    Vitals:    02/06/23 1747   BP: (!) 171/70   Pulse: 80   Resp: 20   Temp: 98.3 °F (36.8 °C)   TempSrc: Oral   SpO2: 98%   Weight: 256 lb 1 oz (116.1 kg)   Height: 5' 5\" (1.651 m)       Patient was given the following medications:  None       CC/HPI Summary, DDx, ED course, and Reassessment: Patient here with right knee pain after bumping it, posteriorly on a cabinet. Injury occurred 2 days ago.   Differential included but was not limited to: Fracture, dislocation, sprain/strain, contusion. Screening x-ray done in the ED showed moderate osteoarthrosis without acute osseous abnormality. Patient reports some peace of mind after getting the x-ray results back. No indication for further testing at this time. She requests a topical prescription medicine    Consults: Non    Discussion with other professionals (optional): None    Social determinants (optional): None    Chronic conditions: NA    Records reviewed: NA    Disposition Considerations (include 1 test not done- why not, d/c vs admit, shared decision making): Screening x-ray done and no indication for further test though considered potential MRI in the future if symptoms persist/worsen. Consider DVT given the posterior nature of the pain (behind her knee). However, she has a very recent injury and though I discussed the possibility of pursuing venous duplex should symptoms worsen or swelling develop, that I do not see an indication at this time given the acuity of her injury. She reports having primary care and orthopedic follow-up. Will prescribe Voltaren gel for pain control. Employed shared decision making. Patient comfortable going home and continuing managing this on outpatient basis with follow-up. FINAL IMPRESSION:      1.  Right knee injury, initial encounter          DISPOSITION/PLAN:   DISPOSITION Decision To Discharge      PATIENT REFERRED TO:  Edwina Arboleda MD  [address]  [phone]          follow up with your orthopedist for persistent symptoms          DISCHARGE MEDICATIONS:  Discharge Medication List as of 2/6/2023  7:17 PM        START taking these medications    Details   diclofenac sodium (VOLTAREN) 1 % GEL Apply 4 g topically 4 times daily, Topical, 4 TIMES DAILY Starting Mon 2/6/2023, Disp-50 g, R-0, Normal                        (Please note thatportions of this note were completed with a voice recognition program.  Efforts were made to edit the dictations, but occasionally words are mis-transcribed.)    Chaparro Angel PA-C (electronicallysigned)              [place], [place]  02/06/23 5772

## 2023-02-06 NOTE — ED TRIAGE NOTES
57y/o female presents to the ED with right posterior knee pain. Pt states that she was putting laundry away, slipped, an hit the back of knee of a cabinet. 6/10. Pt able to ambulate with pain and unable to bend her knee completely.

## 2023-03-12 ENCOUNTER — HOSPITAL ENCOUNTER (EMERGENCY)
Age: 63
Discharge: HOME | End: 2023-03-12
Attending: EMERGENCY MEDICINE
Payer: COMMERCIAL

## 2023-03-12 ENCOUNTER — APPOINTMENT (OUTPATIENT)
Dept: CT IMAGING | Age: 63
End: 2023-03-12
Payer: COMMERCIAL

## 2023-03-12 VITALS
DIASTOLIC BLOOD PRESSURE: 88 MMHG | RESPIRATION RATE: 16 BRPM | HEART RATE: 70 BPM | OXYGEN SATURATION: 100 % | SYSTOLIC BLOOD PRESSURE: 170 MMHG | TEMPERATURE: 98 F

## 2023-03-12 DIAGNOSIS — R51.9 ACUTE NONINTRACTABLE HEADACHE, UNSPECIFIED HEADACHE TYPE: Primary | ICD-10-CM

## 2023-03-12 LAB
A/G RATIO: 1 (ref 1.1–2.2)
ALBUMIN SERPL-MCNC: 3.8 G/DL (ref 3.4–5)
ALP BLD-CCNC: 129 U/L (ref 40–129)
ALT SERPL-CCNC: 14 U/L (ref 10–40)
ANION GAP SERPL CALCULATED.3IONS-SCNC: 13 MMOL/L (ref 3–16)
AST SERPL-CCNC: 21 U/L (ref 15–37)
BASOPHILS ABSOLUTE: 0.1 K/UL (ref 0–0.2)
BASOPHILS RELATIVE PERCENT: 0.9 %
BILIRUB SERPL-MCNC: 0.3 MG/DL (ref 0–1)
BUN BLDV-MCNC: 10 MG/DL (ref 7–20)
CALCIUM SERPL-MCNC: 9.7 MG/DL (ref 8.3–10.6)
CHLORIDE BLD-SCNC: 103 MMOL/L (ref 99–110)
CO2: 23 MMOL/L (ref 21–32)
CREAT SERPL-MCNC: 0.8 MG/DL (ref 0.6–1.2)
EOSINOPHILS ABSOLUTE: 0.2 K/UL (ref 0–0.6)
EOSINOPHILS RELATIVE PERCENT: 3 %
GFR SERPL CREATININE-BSD FRML MDRD: >60 ML/MIN/{1.73_M2}
GLUCOSE BLD-MCNC: 77 MG/DL (ref 70–99)
HCT VFR BLD CALC: 43.5 % (ref 36–48)
HEMOGLOBIN: 14.4 G/DL (ref 12–16)
LYMPHOCYTES ABSOLUTE: 1.6 K/UL (ref 1–5.1)
LYMPHOCYTES RELATIVE PERCENT: 26.4 %
MCH RBC QN AUTO: 27.9 PG (ref 26–34)
MCHC RBC AUTO-ENTMCNC: 33.1 G/DL (ref 31–36)
MCV RBC AUTO: 84.4 FL (ref 80–100)
MONOCYTES ABSOLUTE: 0.6 K/UL (ref 0–1.3)
MONOCYTES RELATIVE PERCENT: 10.1 %
NEUTROPHILS ABSOLUTE: 3.6 K/UL (ref 1.7–7.7)
NEUTROPHILS RELATIVE PERCENT: 59.6 %
PDW BLD-RTO: 14.3 % (ref 12.4–15.4)
PLATELET # BLD: 240 K/UL (ref 135–450)
PMV BLD AUTO: 8.9 FL (ref 5–10.5)
POTASSIUM REFLEX MAGNESIUM: 4 MMOL/L (ref 3.5–5.1)
RBC # BLD: 5.16 M/UL (ref 4–5.2)
SODIUM BLD-SCNC: 139 MMOL/L (ref 136–145)
TOTAL PROTEIN: 7.8 G/DL (ref 6.4–8.2)
WBC # BLD: 6 K/UL (ref 4–11)

## 2023-03-12 PROCEDURE — 6360000002 HC RX W HCPCS

## 2023-03-12 PROCEDURE — 36415 COLL VENOUS BLD VENIPUNCTURE: CPT

## 2023-03-12 PROCEDURE — 99284 EMERGENCY DEPT VISIT MOD MDM: CPT

## 2023-03-12 PROCEDURE — 85025 COMPLETE CBC W/AUTO DIFF WBC: CPT

## 2023-03-12 PROCEDURE — 96374 THER/PROPH/DIAG INJ IV PUSH: CPT

## 2023-03-12 PROCEDURE — 96375 TX/PRO/DX INJ NEW DRUG ADDON: CPT

## 2023-03-12 PROCEDURE — 70450 CT HEAD/BRAIN W/O DYE: CPT

## 2023-03-12 PROCEDURE — 80053 COMPREHEN METABOLIC PANEL: CPT

## 2023-03-12 RX ORDER — PROCHLORPERAZINE EDISYLATE 5 MG/ML
10 INJECTION INTRAMUSCULAR; INTRAVENOUS ONCE
Status: COMPLETED | OUTPATIENT
Start: 2023-03-12 | End: 2023-03-12

## 2023-03-12 RX ORDER — DIPHENHYDRAMINE HYDROCHLORIDE 50 MG/ML
25 INJECTION INTRAMUSCULAR; INTRAVENOUS ONCE
Status: COMPLETED | OUTPATIENT
Start: 2023-03-12 | End: 2023-03-12

## 2023-03-12 RX ADMIN — PROCHLORPERAZINE EDISYLATE 10 MG: 5 INJECTION INTRAMUSCULAR; INTRAVENOUS at 19:00

## 2023-03-12 RX ADMIN — DIPHENHYDRAMINE HYDROCHLORIDE 25 MG: 50 INJECTION, SOLUTION INTRAMUSCULAR; INTRAVENOUS at 19:01

## 2023-03-12 ASSESSMENT — PAIN SCALES - GENERAL: PAINLEVEL_OUTOF10: 9

## 2023-03-12 ASSESSMENT — PAIN DESCRIPTION - LOCATION: LOCATION: HEAD

## 2023-03-12 ASSESSMENT — PAIN - FUNCTIONAL ASSESSMENT: PAIN_FUNCTIONAL_ASSESSMENT: 0-10

## 2023-03-12 ASSESSMENT — PAIN DESCRIPTION - DESCRIPTORS: DESCRIPTORS: POUNDING

## 2023-03-12 NOTE — ED PROVIDER NOTES
ED Attending Attestation Note     Date of evaluation: 3/12/2023    This patient was seen by the resident.  I have seen and examined the patient, agree with the workup, evaluation, management and diagnosis. The care plan has been discussed.  Юлия is a 62-year-old female with a past medical history of hypertension and GERD who presents to the emergency department today with a headache x1 week.  This is in the front of her head and radiating down into her eyes.  She has been trying ibuprofen at home without relief.  She is having phonophobia but no photophobia.  She denies any other complaints.  She did have a similar headache approximately 20 years ago.  No nausea or vomiting.  On exam, lungs are clear and equal.  Abdomen soft and nontender.  Radial pulses 2+.  Cranial nerves II through XII are intact.  Finger-nose, rapid alternating movements, heel-to-shin are intact.  Muscle strength is 5/5 in bilateral upper and lower extremities.  Sensation is intact in bilateral upper and lower extremities.  Romberg is negative.       Gavin Asher MD  03/12/23 1950

## 2023-03-12 NOTE — ED PROVIDER NOTES
[address] [facility] RESIDENT NOTE     Date of evaluation: 3/12/2023    ADDENDUM:      Care of this patient was assumed from Dr. Vera Fothergill. The patient was seen for Headache (Continuous pounding headache x1 wk. Ibuprofen with no relief. )  . The patient's initial evaluation and plan have been discussed with the prior provider who initially evaluated the patient. Nursing Notes, Past Medical Hx, Past Surgical Hx, Social Hx, Allergies, and Family Hx were all reviewed. [address] [facility] / FELIX / Ramon Hairston is a 63 y.o. female who presented to the emergency department for evaluation of headache for 1 week. On arrival she was afebrile and hemodynamically stable. Her vitals are notable for hypertension, however she does have a known history of this. No signs of endorgan dysfunction. On exam, she did not have any signs of meningismus, nor any neuro deficits. My responsibilities in the patient's care, was to follow-up on CMP and CT head. CMP unremarkable. CT head without intracranial abnormalities including intracranial bleeds, obvious mass, ventriculomegaly obvious areas of ischemia. On reassessment the patient, after receiving Compazine and Benadryl, she reported significant improvement in her headache. Was well-appearing, with stable examination. Given reassuring work-up and symptomatic improvement, patient will be discharged at this time. Strict return precautions provided. Is this patient to be included in the SEP-1 core measure due to severe sepsis or septic shock? No Exclusion criteria - the patient is NOT to be included for SEP-1 Core Measure due to: Infection is not suspected    Medical Decision Making  Problems Addressed:  Acute nonintractable headache, unspecified headache type: acute illness or injury    Amount and/or Complexity of Data Reviewed  Labs: ordered. Radiology: ordered.  Decision-making details documented in ED Course. Risk  Prescription drug management. This patient was also evaluated by the attending physician. All care plans were discussed and agreed upon. Clinical Impression     1. Acute nonintractable headache, unspecified headache type        Disposition     PATIENT REFERRED TO:  No follow-up provider specified. DISCHARGE MEDICATIONS:  New Prescriptions    No medications on file       DISPOSITION Decision To Discharge 03/12/2023 08:58:55 PM        Diagnostic Results and Other Data     RADIOLOGY:  CT Head W/O Contrast   Final Result   1. No acute intracranial abnormality.           LABS:   Results for orders placed or performed during the hospital encounter of 03/12/23   CBC with Auto Differential   Result Value Ref Range    WBC 6.0 4.0 - 11.0 K/uL    RBC 5.16 4.00 - 5.20 M/uL    Hemoglobin 14.4 12.0 - 16.0 g/dL    Hematocrit 43.5 36.0 - 48.0 %    MCV 84.4 80.0 - 100.0 fL    MCH 27.9 26.0 - 34.0 pg    MCHC 33.1 31.0 - 36.0 g/dL    RDW 14.3 12.4 - 15.4 %    Platelets 040 886 - 029 K/uL    MPV 8.9 5.0 - 10.5 fL    Neutrophils % 59.6 %    Lymphocytes % 26.4 %    Monocytes % 10.1 %    Eosinophils % 3.0 %    Basophils % 0.9 %    Neutrophils Absolute 3.6 1.7 - 7.7 K/uL    Lymphocytes Absolute 1.6 1.0 - 5.1 K/uL    Monocytes Absolute 0.6 0.0 - 1.3 K/uL    Eosinophils Absolute 0.2 0.0 - 0.6 K/uL    Basophils Absolute 0.1 0.0 - 0.2 K/uL   CMP w/ Reflex to MG   Result Value Ref Range    Sodium 139 136 - 145 mmol/L    Potassium reflex Magnesium 4.0 3.5 - 5.1 mmol/L    Chloride 103 99 - 110 mmol/L    CO2 23 21 - 32 mmol/L    Anion Gap 13 3 - 16    Glucose 77 70 - 99 mg/dL    BUN 10 7 - 20 mg/dL    Creatinine 0.8 0.6 - 1.2 mg/dL    Est, Glom Filt Rate >60 >60    Calcium 9.7 8.3 - 10.6 mg/dL    Total Protein 7.8 6.4 - 8.2 g/dL    Albumin 3.8 3.4 - 5.0 g/dL    Albumin/Globulin Ratio 1.0 (L) 1.1 - 2.2    Total Bilirubin 0.3 0.0 - 1.0 mg/dL    Alkaline Phosphatase 129 40 - 129 U/L    ALT 14 10 - 40 U/L    AST 21 15 - 37 U/L     EKG   No EKG was performed during this visit. RECENT VITALS:  BP: (!) 170/88, Temp: 98 °F (36.7 °C), Heart Rate: 70, Resp: 16, SpO2: 100 %     Procedures     N/A    ED Course     ED Course as of 03/12/23 2101   Sun Mar 12, 2023   1916 Pending CTH, CMP  Hx HTN, presenting w/ HA x 1 week [SH]   2040 CT Head W/O Contrast  IMPRESSION:  1.  No acute intracranial abnormality. [SH]      ED Course User Index  [SH] Livan Noriega MD       The patient was given the following medications:  Orders Placed This Encounter   Medications    prochlorperazine (COMPAZINE) injection 10 mg    diphenhydrAMINE (BENADRYL) injection 25 mg       CONSULTS:  None        Livan Noriega MD  Resident  03/12/23 2101

## 2023-03-12 NOTE — ED PROVIDER NOTES
[address] RESIDENT NOTE     Date of evaluation: 3/12/2023    Chief Complaint     Headache (Continuous pounding headache x1 wk. Ibuprofen with no relief. )    History of Present Illness     Юлия Barahona is a 63 y.o. female with a history of hypertension who presents with 1 week of gradual onset headache. She describes it as a pounding, punching sensation on the top of her head that radiates down to her eyebrows on both sides equally. She reports that the last time she had a headache like this was greater than 20 years ago. She notes some photophobia. Denies any visual changes including blurry vision or double vision. Denies issues with her gait. Denies any focal neurologic symptoms like weakness, numbness, tingling. Notes that she has been taking ibuprofen 400 mg every 6 hours for the last 1 week without much improvement in her symptoms. Denies any fevers, chills, prodromal illnesses. Denies any back pain, neck tightness neck stiffness. Denies jaw claudication. MEDICAL DECISION MAKING / ASSESSMENT / PLAN     INITIAL VITALS: BP: (!) 186/103, Temp: 98 °F (36.7 °C), Heart Rate: 72, Resp: 18, SpO2: 98 %    Юлия Barahona is a 63 y.o. female with history of hypertension who presented with 1 week of gradual onset headache. Denies history of migraines or headaches in the past.  She presented hemodynamically stable with an elevated blood pressure, but afebrile and satting 100% on room air. My examination reveals a nonfocal neurologic exam and a comfortable appearing patient with notable photophobia. Differential diagnoses includes migraine, tension type headache, cluster headache, intracranial process, IIH. The patient is not currently having any visual symptoms, making IIH less likely. Given that this is this patient's first ED visit for headache, basic labs and CT Noncon of the head will be completed.   Patient provided with Compazine and Benadryl for symptom relief. The care of this patient will be handed off to the oncoming resident physician. Please see her note for the rest of this patient's ED course. Is this patient to be included in the SEP-1 core measure due to severe sepsis or septic shock? No Exclusion criteria - the patient is NOT to be included for SEP-1 Core Measure due to: Infection is not suspected    Medical Decision Making  Amount and/or Complexity of Data Reviewed  Labs: ordered. Radiology: ordered. Risk  Prescription drug management. This patient was also evaluated by the attending physician. All care plans werediscussed and agreed upon. Clinical Impression     No diagnosis found. Disposition     PATIENT REFERRED TO:  No follow-up provider specified.     DISCHARGE MEDICATIONS:  New Prescriptions    No medications on file       DISPOSITION      Diagnostic Results and Other Data     RADIOLOGY:  CT Head W/O Contrast    (Results Pending)       LABS:   Results for orders placed or performed during the hospital encounter of 03/12/23   CBC with Auto Differential   Result Value Ref Range    WBC 6.0 4.0 - 11.0 K/uL    RBC 5.16 4.00 - 5.20 M/uL    Hemoglobin 14.4 12.0 - 16.0 g/dL    Hematocrit 43.5 36.0 - 48.0 %    MCV 84.4 80.0 - 100.0 fL    MCH 27.9 26.0 - 34.0 pg    MCHC 33.1 31.0 - 36.0 g/dL    RDW 14.3 12.4 - 15.4 %    Platelets 237 926 - 111 K/uL    MPV 8.9 5.0 - 10.5 fL    Neutrophils % 59.6 %    Lymphocytes % 26.4 %    Monocytes % 10.1 %    Eosinophils % 3.0 %    Basophils % 0.9 %    Neutrophils Absolute 3.6 1.7 - 7.7 K/uL    Lymphocytes Absolute 1.6 1.0 - 5.1 K/uL    Monocytes Absolute 0.6 0.0 - 1.3 K/uL    Eosinophils Absolute 0.2 0.0 - 0.6 K/uL    Basophils Absolute 0.1 0.0 - 0.2 K/uL   CMP w/ Reflex to MG   Result Value Ref Range    Sodium 139 136 - 145 mmol/L    Potassium reflex Magnesium 4.0 3.5 - 5.1 mmol/L    Chloride 103 99 - 110 mmol/L    CO2 23 21 - 32 mmol/L    Anion Gap 13 3 - 16    Glucose 77 70 - 99 mg/dL    BUN 10 7 - 20 mg/dL    Creatinine 0.8 0.6 - 1.2 mg/dL    Est, Glom Filt Rate >60 >60    Calcium 9.7 8.3 - 10.6 mg/dL    Total Protein 7.8 6.4 - 8.2 g/dL    Albumin 3.8 3.4 - 5.0 g/dL    Albumin/Globulin Ratio 1.0 (L) 1.1 - 2.2    Total Bilirubin 0.3 0.0 - 1.0 mg/dL    Alkaline Phosphatase 129 40 - 129 U/L    ALT 14 10 - 40 U/L    AST 21 15 - 37 U/L       EKG   Not done    ED BEDSIDE ULTRASOUND:  No results found. RECENT VITALS:  BP: (!) 170/88, Temp: 98 °F (36.7 °C), Heart Rate: 70,Resp: 16, SpO2: 100 %     Procedures       ED Course     Nursing Notes, Past Medical Hx, Past Surgical Hx, Social Hx, Allergies, and Family Hx were reviewed. ED Course as of 23   Flores Storey Mar 12, 2023   191 Pending CTH, CMP  Hx HTN, presenting w/ HA x 1 week [SH]      ED Course User Index  [SH] Charles Velez MD       The patient was given the following medications:  Orders Placed This Encounter   Medications    prochlorperazine (COMPAZINE) injection 10 mg    diphenhydrAMINE (BENADRYL) injection 25 mg       CONSULTS:  None    Review of Systems     Review of Systems    Past Medical, Surgical, Family, and Social History     She has a past medical history of GERD (gastroesophageal reflux disease), Gestational diabetes, Hypertension, Morbid obesity due to excess calories ([address] Severe persistent asthma, Vitamin D deficiency, and Xerosis cutis. She has a past surgical history that includes  section (). Her family history is not on file. She reports that she has never smoked. She has never used smokeless tobacco. She reports current alcohol use. She reports that she does not use drugs.     Medications     Previous Medications    ACETAMINOPHEN (TYLENOL) 500 MG TABLET    Take 1 tablet by mouth every 6 hours as needed for Pain    AMLODIPINE (NORVASC) 5 MG TABLET    TAKE 1 TABLET BY MOUTH EVERY DAY    ARNUITY ELLIPTA 200 MCG/ACT AEPB    INHALE 1 PUFF BY MOUTH DAILY    CALTRATE 600+D PLUS MINERALS (CALTRATE) 600-800 MG-UNIT TABS TABLET    Take 1 tablet by mouth 2 times daily Okay to give generic equivalent and if not covered direct patient to buy OTC. CANDESARTAN (ATACAND) 32 MG TABLET    Take 1 tablet by mouth daily    DICLOFENAC SODIUM (VOLTAREN) 1 % GEL    Apply 4 g topically 4 times daily    HYDROCHLOROTHIAZIDE (HYDRODIURIL) 25 MG TABLET    TAKE 1 TABLET BY MOUTH EVERY DAY IN THE MORNING    IPRATROPIUM (ATROVENT) 0.06 % NASAL SPRAY    2 sprays by Each Nostril route 4 times daily    PANTOPRAZOLE (PROTONIX) 40 MG TABLET    TAKE 1 TABLET BY MOUTH EVERY DAY BEFORE BREAKFAST    VITAMIN D3 (CHOLECALCIFEROL) 25 MCG (1000 UT) TABS TABLET    Take 2 tablets by mouth daily       Allergies     She is allergic to chloroquine, lisinopril, mefloquine, and quinolones. Physical Exam     INITIAL VITALS: BP: (!) 186/103, Temp: 98 °F (36.7 °C), Heart Rate: 72, Resp: 18, SpO2: 98 %   Physical Exam  Constitutional:       Appearance: Normal appearance. HENT:      Head: Normocephalic and atraumatic. Right Ear: External ear normal.      Left Ear: External ear normal.      Nose: Nose normal.      Mouth/Throat:      Pharynx: Oropharynx is clear. Eyes:      Pupils: Pupils are equal, round, and reactive to light. Cardiovascular:      Rate and Rhythm: Normal rate and regular rhythm. Pulses: Normal pulses. Pulmonary:      Effort: Pulmonary effort is normal.      Breath sounds: Normal breath sounds. Abdominal:      General: Abdomen is flat. Palpations: Abdomen is soft. Tenderness: There is no abdominal tenderness. Musculoskeletal:         General: Normal range of motion. Cervical back: No tenderness. Skin:     General: Skin is warm and dry. Capillary Refill: Capillary refill takes less than 2 seconds. Neurological:      Mental Status: She is alert and oriented to person, place, and time. Mental status is at baseline.    Psychiatric:         Mood and Affect: Mood normal.         Behavior: Behavior normal. Kayy Bowens MD  Resident  03/12/23 1126

## 2023-03-13 NOTE — DISCHARGE INSTRUCTIONS
You are seen in the emergency department for headache. Your work-up including a head CT was reassuring. We recommend that you continue taking ibuprofen & Tylenol for your symptoms as needed. You can take ibuprofen 600 mg every 6 hours as needed, or ibuprofen 800 mg every 8 hours as needed. You can take Tylenol 1000 mg every 8 hours 2. We recommend staggering these medications. You should also continue staying hydrated and resting as needed 2.   Return to the emergency department if you develop any the following: Sudden onset severe headaches, changes to your vision, weakness or numbness in your arms or legs, fever, chest pain, shortness of breath or confusion

## 2023-04-04 ENCOUNTER — TELEMEDICINE (OUTPATIENT)
Dept: PRIMARY CARE CLINIC | Age: 63
End: 2023-04-04
Payer: COMMERCIAL

## 2023-04-04 DIAGNOSIS — R35.0 FREQUENCY OF URINATION: ICD-10-CM

## 2023-04-04 DIAGNOSIS — E66.01 OBESITY, CLASS III, BMI 40-49.9 (MORBID OBESITY) (HCC): ICD-10-CM

## 2023-04-04 DIAGNOSIS — I10 ESSENTIAL HYPERTENSION: ICD-10-CM

## 2023-04-04 PROCEDURE — 99213 OFFICE O/P EST LOW 20 MIN: CPT | Performed by: INTERNAL MEDICINE

## 2023-04-04 RX ORDER — CANDESARTAN 32 MG/1
32 TABLET ORAL DAILY
Qty: 90 TABLET | Refills: 4 | Status: SHIPPED | OUTPATIENT
Start: 2023-04-04

## 2023-04-04 RX ORDER — LABETALOL 100 MG/1
100 TABLET, FILM COATED ORAL 2 TIMES DAILY
Qty: 60 TABLET | Refills: 3 | Status: SHIPPED | OUTPATIENT
Start: 2023-04-04

## 2023-04-04 RX ORDER — AZELAIC ACID 0.15 G/G
GEL TOPICAL
COMMUNITY
Start: 2023-02-19

## 2023-04-04 SDOH — ECONOMIC STABILITY: FOOD INSECURITY: WITHIN THE PAST 12 MONTHS, YOU WORRIED THAT YOUR FOOD WOULD RUN OUT BEFORE YOU GOT MONEY TO BUY MORE.: NEVER TRUE

## 2023-04-04 SDOH — ECONOMIC STABILITY: HOUSING INSECURITY
IN THE LAST 12 MONTHS, WAS THERE A TIME WHEN YOU DID NOT HAVE A STEADY PLACE TO SLEEP OR SLEPT IN A SHELTER (INCLUDING NOW)?: NO

## 2023-04-04 SDOH — ECONOMIC STABILITY: INCOME INSECURITY: HOW HARD IS IT FOR YOU TO PAY FOR THE VERY BASICS LIKE FOOD, HOUSING, MEDICAL CARE, AND HEATING?: NOT HARD AT ALL

## 2023-04-04 SDOH — ECONOMIC STABILITY: FOOD INSECURITY: WITHIN THE PAST 12 MONTHS, THE FOOD YOU BOUGHT JUST DIDN'T LAST AND YOU DIDN'T HAVE MONEY TO GET MORE.: NEVER TRUE

## 2023-04-04 ASSESSMENT — ENCOUNTER SYMPTOMS
DIARRHEA: 0
CHEST TIGHTNESS: 0
BLOOD IN STOOL: 0
EYES NEGATIVE: 1
CONSTIPATION: 0
GASTROINTESTINAL NEGATIVE: 1
ABDOMINAL PAIN: 0
WHEEZING: 0
SHORTNESS OF BREATH: 0
COUGH: 0
ABDOMINAL DISTENTION: 0

## 2023-04-04 ASSESSMENT — PATIENT HEALTH QUESTIONNAIRE - PHQ9
SUM OF ALL RESPONSES TO PHQ QUESTIONS 1-9: 0
2. FEELING DOWN, DEPRESSED OR HOPELESS: 0
SUM OF ALL RESPONSES TO PHQ QUESTIONS 1-9: 0
SUM OF ALL RESPONSES TO PHQ9 QUESTIONS 1 & 2: 0
1. LITTLE INTEREST OR PLEASURE IN DOING THINGS: 0
SUM OF ALL RESPONSES TO PHQ QUESTIONS 1-9: 0
SUM OF ALL RESPONSES TO PHQ QUESTIONS 1-9: 0

## 2023-04-04 NOTE — PROGRESS NOTES
encounter. Patient identification was verified at the start of the visit. She (or guardian if applicable) is aware that this is a billable service, which includes applicable co-pays. This visit was conducted with the patient's (and/or legal guardian's) verbal consent. She has not had a related appointment within my department in the past 7 days or scheduled within the next 24 hours. The patient was located at Home: [address] [place] [address]. The provider was located at [facility] (Appt Dept): [address] [facility],  [address] [facility]. Note: not billable if this call serves to triage the patient into an appointment for the relevant concern    Alisha Chandra MD    Iris was seen today for medication check. Diagnoses and all orders for this visit:    Essential hypertension     DC Norvasc . Continue  Atacand + adding Labetalol bid . Low salt diet . -     candesartan (ATACAND) 32 MG tablet; Take 1 tablet by mouth daily  -     labetalol (NORMODYNE) 100 MG tablet; Take 1 tablet by mouth 2 times daily  -     NV SPHYG/BP VANESSA W CUFF AND STET    Frequency of urination  Nl A1C in 12/22   . Off Hctz .  Ok to DC Norvasc     Obesity, Class III, BMI 40-49.9 (morbid obesity) (HCC)  BMI > 42  Advised low carb diet with Intermittent Fasting                   Plan:           Alisha Chandra MD

## 2023-04-20 DIAGNOSIS — I10 ESSENTIAL HYPERTENSION: ICD-10-CM

## 2023-04-20 RX ORDER — AMLODIPINE BESYLATE 5 MG/1
TABLET ORAL
Qty: 90 TABLET | Refills: 1 | Status: SHIPPED | OUTPATIENT
Start: 2023-04-20

## 2023-04-20 NOTE — TELEPHONE ENCOUNTER
Medication:   Requested Prescriptions     Pending Prescriptions Disp Refills    amLODIPine (NORVASC) 5 MG tablet [Pharmacy Med Name: AMLODIPINE BESYLATE 5 MG TAB] 90 tablet 1     Sig: TAKE 1 TABLET BY MOUTH EVERY DAY        Last Filled:      Patient Phone Number: [phone] (home) [phone] (work)    Last appt: 4/4/2023   Next appt: Visit date not found    Last OARRS: No flowsheet data found.

## 2023-04-26 DIAGNOSIS — I10 ESSENTIAL HYPERTENSION: ICD-10-CM

## 2023-04-26 RX ORDER — LABETALOL 100 MG/1
TABLET, FILM COATED ORAL
Qty: 60 TABLET | Refills: 3 | Status: SHIPPED | OUTPATIENT
Start: 2023-04-26

## 2023-04-26 NOTE — TELEPHONE ENCOUNTER
Medication:   Requested Prescriptions     Pending Prescriptions Disp Refills    labetalol (NORMODYNE) 100 MG tablet [Pharmacy Med Name: LABETALOL  MG TABLET] 60 tablet 3     Sig: TAKE 1 TABLET BY MOUTH TWICE A DAY        Last Filled:      Patient Phone Number: [phone] (home) [phone] (work)    Last appt: 4/4/2023   Next appt: Visit date not found    Last OARRS: No flowsheet data found.

## 2023-05-06 DIAGNOSIS — Z00.00 PHYSICAL EXAM: ICD-10-CM

## 2023-05-08 RX ORDER — FLUTICASONE FUROATE 200 UG/1
POWDER RESPIRATORY (INHALATION)
Qty: 90 EACH | Refills: 3 | Status: SHIPPED | OUTPATIENT
Start: 2023-05-08

## 2023-05-08 NOTE — TELEPHONE ENCOUNTER
Medication:   Requested Prescriptions     Pending Prescriptions Disp Refills    ARNUITY ELLIPTA 200 MCG/ACT AEPB [Pharmacy Med Name: Patricia Padillal 200 MCG INH] 90 each 3     Sig: INHALE 1 PUFF BY MOUTH DAILY        Last Filled:      Patient Phone Number: [phone] (home) [phone] (work)    Last appt: 4/4/2023   Next appt: Visit date not found    Last OARRS: No flowsheet data found.

## 2023-05-22 ENCOUNTER — HOSPITAL ENCOUNTER (EMERGENCY)
Age: 63
Discharge: HOME | End: 2023-05-23
Attending: EMERGENCY MEDICINE
Payer: COMMERCIAL

## 2023-05-22 ENCOUNTER — APPOINTMENT (OUTPATIENT)
Dept: GENERAL RADIOLOGY | Age: 63
End: 2023-05-22
Payer: COMMERCIAL

## 2023-05-22 VITALS
DIASTOLIC BLOOD PRESSURE: 91 MMHG | WEIGHT: 254 LBS | TEMPERATURE: 98.2 F | HEIGHT: 65 IN | BODY MASS INDEX: 42.32 KG/M2 | OXYGEN SATURATION: 97 % | HEART RATE: 81 BPM | RESPIRATION RATE: 20 BRPM | SYSTOLIC BLOOD PRESSURE: 182 MMHG

## 2023-05-22 DIAGNOSIS — R07.81 RIB PAIN: Primary | ICD-10-CM

## 2023-05-22 PROCEDURE — 96374 THER/PROPH/DIAG INJ IV PUSH: CPT

## 2023-05-22 PROCEDURE — 71046 X-RAY EXAM CHEST 2 VIEWS: CPT

## 2023-05-22 PROCEDURE — 6360000002 HC RX W HCPCS

## 2023-05-22 PROCEDURE — 99284 EMERGENCY DEPT VISIT MOD MDM: CPT

## 2023-05-22 PROCEDURE — 6370000000 HC RX 637 (ALT 250 FOR IP)

## 2023-05-22 RX ORDER — LIDOCAINE 4 G/G
1 PATCH TOPICAL DAILY
Status: DISCONTINUED | OUTPATIENT
Start: 2023-05-22 | End: 2023-05-23 | Stop reason: HOSPADM

## 2023-05-22 RX ORDER — METHOCARBAMOL 500 MG/1
750 TABLET, FILM COATED ORAL ONCE
Status: COMPLETED | OUTPATIENT
Start: 2023-05-22 | End: 2023-05-22

## 2023-05-22 RX ORDER — KETOROLAC TROMETHAMINE 30 MG/ML
15 INJECTION, SOLUTION INTRAMUSCULAR; INTRAVENOUS ONCE
Status: COMPLETED | OUTPATIENT
Start: 2023-05-22 | End: 2023-05-22

## 2023-05-22 RX ADMIN — KETOROLAC TROMETHAMINE 15 MG: 30 INJECTION, SOLUTION INTRAMUSCULAR; INTRAVENOUS at 21:54

## 2023-05-22 RX ADMIN — METHOCARBAMOL 750 MG: 500 TABLET ORAL at 23:59

## 2023-05-22 ASSESSMENT — PAIN - FUNCTIONAL ASSESSMENT: PAIN_FUNCTIONAL_ASSESSMENT: 0-10

## 2023-05-22 ASSESSMENT — PAIN DESCRIPTION - ORIENTATION: ORIENTATION: RIGHT;LEFT

## 2023-05-22 ASSESSMENT — PAIN DESCRIPTION - FREQUENCY: FREQUENCY: INTERMITTENT

## 2023-05-22 ASSESSMENT — PAIN SCALES - GENERAL: PAINLEVEL_OUTOF10: 8

## 2023-05-22 ASSESSMENT — PAIN DESCRIPTION - DESCRIPTORS: DESCRIPTORS: SPASM

## 2023-05-22 ASSESSMENT — PAIN DESCRIPTION - PAIN TYPE: TYPE: ACUTE PAIN

## 2023-05-22 ASSESSMENT — PAIN DESCRIPTION - LOCATION: LOCATION: RIB CAGE

## 2023-05-22 NOTE — ED TRIAGE NOTES
59y/o female presents to the ED with bilateral rib pain. Pt states she bump into the doorway and had pain since then on the left rib cage. Pt states it has now progressed to left side as well.  Pt states hurts to take a deep breath. -priyanka,

## 2023-05-23 PROCEDURE — 6370000000 HC RX 637 (ALT 250 FOR IP)

## 2023-05-23 RX ORDER — METHOCARBAMOL 500 MG/1
500 TABLET, FILM COATED ORAL 4 TIMES DAILY
Qty: 40 TABLET | Refills: 0 | Status: SHIPPED | OUTPATIENT
Start: 2023-05-23 | End: 2023-06-02

## 2023-05-23 NOTE — DISCHARGE INSTRUCTIONS
Thank you for allowing us take part in your care. You may use the Robaxin you are prescribed to help you with your muscle spasms. Please take care this medication can cause sedation. You should return to the emergency department if your symptoms worsen or do not resolve. In addition, return if:  - You have a fever (greater than 101 degrees)  - You have chest pain, shortness of breath, abdominal pain, or uncontrollable vomiting  - You are unable to eat or drink  - You pass out  - You have difficulty moving your arms or legs   - You have difficulty speaking or slurred speech  - Or you have any concern that you feel needs acute physician evaluation.

## 2023-05-27 NOTE — ED PROVIDER NOTES
ED Attending Attestation Note     Date of evaluation: 5/22/2023    This patient was seen by the resident. I have seen and examined the patient, agree with the workup, evaluation, management and diagnosis. The care plan has been discussed. My assessment reveals appearing 72-year-old female who has a left-sided chest wall tenderness on examination without crepitance or deformity. Clear lungs. Howard Watson MD  05/27/23 0389
strict return precautions on reasons to return to the emergency department. Medical Decision Making  Amount and/or Complexity of Data Reviewed  Radiology: ordered. Decision-making details documented in ED Course. ECG/medicine tests: ordered. Risk  OTC drugs. Prescription drug management. This patient was also evaluated by the attending physician. All care plans werediscussed and agreed upon. EKG Interpretation    Interpreted by emergency department physician    Rhythm: normal sinus   Rate: normal  Axis: normal  Ectopy: none  Conduction: normal  ST Segments: no acute change  T Waves: no acute change  Q Waves: none    Clinical Impression: no acute changes    Derick Hull MD     Clinical Impression     1. Rib pain        Disposition     PATIENT REFERRED TO:  No follow-up provider specified. DISCHARGE MEDICATIONS:  New Prescriptions    METHOCARBAMOL (ROBAXIN) 500 MG TABLET    Take 1 tablet by mouth 4 times daily for 10 days       DISPOSITION Decision To Discharge 05/23/2023 12:07:09 AM        Diagnostic Results and Other Data     RADIOLOGY:  XR CHEST (2 VW)   Final Result      No evidence for acute cardiopulmonary disease. LABS:   No results found for this visit on 05/22/23. ED BEDSIDE ULTRASOUND:  No results found. RECENT VITALS:  BP: (!) 182/91, Temp: 98.2 °F (36.8 °C), Pulse: 81,Respirations: 20, SpO2: 97 %     Procedures       ED Course     Nursing Notes, Past Medical Hx, Past Surgical Hx, Social Hx, Allergies, and Family Hx were reviewed.       The patient was given the following medications:  Orders Placed This Encounter   Medications    ketorolac (TORADOL) injection 15 mg    lidocaine 4 % external patch 1 patch    methocarbamol (ROBAXIN) tablet 750 mg    methocarbamol (ROBAXIN) 500 MG tablet     Sig: Take 1 tablet by mouth 4 times daily for 10 days     Dispense:  40 tablet     Refill:  0       CONSULTS:  None    Review of Systems     Review of systems was performed and

## 2023-06-01 ENCOUNTER — TELEMEDICINE (OUTPATIENT)
Dept: PRIMARY CARE CLINIC | Age: 63
End: 2023-06-01
Payer: COMMERCIAL

## 2023-06-01 DIAGNOSIS — I10 ESSENTIAL HYPERTENSION: Primary | ICD-10-CM

## 2023-06-01 PROCEDURE — 99212 OFFICE O/P EST SF 10 MIN: CPT | Performed by: INTERNAL MEDICINE

## 2023-06-01 ASSESSMENT — ENCOUNTER SYMPTOMS
COUGH: 0
ABDOMINAL PAIN: 0
DIARRHEA: 0
CHEST TIGHTNESS: 0
EYES NEGATIVE: 1
SHORTNESS OF BREATH: 0
ABDOMINAL DISTENTION: 0
CONSTIPATION: 0
GASTROINTESTINAL NEGATIVE: 1
WHEEZING: 0
BLOOD IN STOOL: 0

## 2023-06-01 NOTE — PROGRESS NOTES
identification was verified at the start of the visit. She (or guardian if applicable) is aware that this is a billable service, which includes applicable co-pays. This visit was conducted with the patient's (and/or legal guardian's) verbal consent. She has not had a related appointment within my department in the past 7 days or scheduled within the next 24 hours. The patient was located at Home: [address] [place] [address]. The provider was located at [facility] (Appt Dept): [address] [facility],  [address] [facility]. Note: not billable if this call serves to triage the patient into an appointment for the relevant concern    MD Юлия Reynaga was seen today for hypertension. Diagnoses and all orders for this visit:    Essential hypertension     off Norvasc . On  Atacand + Labetalol bid . Low salt diet . Advised to bring in all meds and f/u in office   -     candesartan (ATACAND) 32 MG tablet; Take 1 tablet by mouth daily  -     labetalol (NORMODYNE) 100 MG tablet; Take 1 tablet by mouth 2 times daily  -     RI SPHYG/BP VANESSA W CUFF AND STET    Frequency of urination  Nl A1C in 12/22   . Off Hctz .      Obesity, Class III, BMI 40-49.9 (morbid obesity) (HCC)  BMI > 42  Advised low carb diet with Intermittent Fasting                   Plan:           Orion Myers MD

## 2023-06-06 ENCOUNTER — OFFICE VISIT (OUTPATIENT)
Dept: PRIMARY CARE CLINIC | Age: 63
End: 2023-06-06
Payer: COMMERCIAL

## 2023-06-06 VITALS — SYSTOLIC BLOOD PRESSURE: 180 MMHG | DIASTOLIC BLOOD PRESSURE: 100 MMHG | BODY MASS INDEX: 42.1 KG/M2 | WEIGHT: 253 LBS

## 2023-06-06 DIAGNOSIS — R05.8 OTHER COUGH: Primary | ICD-10-CM

## 2023-06-06 DIAGNOSIS — K21.9 GASTROESOPHAGEAL REFLUX DISEASE WITHOUT ESOPHAGITIS: ICD-10-CM

## 2023-06-06 DIAGNOSIS — I10 ESSENTIAL HYPERTENSION: ICD-10-CM

## 2023-06-06 LAB
EKG ATRIAL RATE: 80 BPM
EKG DIAGNOSIS: NORMAL
EKG P AXIS: 70 DEGREES
EKG P-R INTERVAL: 148 MS
EKG Q-T INTERVAL: 390 MS
EKG QRS DURATION: 84 MS
EKG QTC CALCULATION (BAZETT): 449 MS
EKG R AXIS: 25 DEGREES
EKG T AXIS: 45 DEGREES
EKG VENTRICULAR RATE: 80 BPM

## 2023-06-06 PROCEDURE — 3077F SYST BP >= 140 MM HG: CPT | Performed by: INTERNAL MEDICINE

## 2023-06-06 PROCEDURE — 99214 OFFICE O/P EST MOD 30 MIN: CPT | Performed by: INTERNAL MEDICINE

## 2023-06-06 PROCEDURE — 3080F DIAST BP >= 90 MM HG: CPT | Performed by: INTERNAL MEDICINE

## 2023-06-06 RX ORDER — PANTOPRAZOLE SODIUM 40 MG/1
40 TABLET, DELAYED RELEASE ORAL 2 TIMES DAILY
Qty: 60 TABLET | Refills: 5 | Status: SHIPPED | OUTPATIENT
Start: 2023-06-06

## 2023-06-06 RX ORDER — HYDROCODONE BITARTRATE AND HOMATROPINE METHYLBROMIDE ORAL SOLUTION 5; 1.5 MG/5ML; MG/5ML
2.5 LIQUID ORAL 2 TIMES DAILY PRN
Qty: 5 ML | Refills: 0 | Status: SHIPPED | OUTPATIENT
Start: 2023-06-06 | End: 2023-06-11

## 2023-06-06 RX ORDER — CANDESARTAN 32 MG/1
32 TABLET ORAL DAILY
Qty: 90 TABLET | Refills: 4 | Status: SHIPPED | OUTPATIENT
Start: 2023-06-06

## 2023-06-06 RX ORDER — AMLODIPINE BESYLATE 5 MG/1
5 TABLET ORAL DAILY
Qty: 90 TABLET | Refills: 1 | Status: SHIPPED | OUTPATIENT
Start: 2023-06-06

## 2023-06-06 ASSESSMENT — ENCOUNTER SYMPTOMS
BLOOD IN STOOL: 0
WHEEZING: 0
COUGH: 0
CHEST TIGHTNESS: 0
SHORTNESS OF BREATH: 0
CONSTIPATION: 0
EYES NEGATIVE: 1
GASTROINTESTINAL NEGATIVE: 1
ABDOMINAL DISTENTION: 0
DIARRHEA: 0
ABDOMINAL PAIN: 0

## 2023-06-06 NOTE — PROGRESS NOTES
of Systems   Constitutional:  Negative for activity change, appetite change, fever and unexpected weight change. Tdap 2015     Flu Vac 12/22    Pneum Vac 12/19    Shingrex   12/21 #1    Covid Vac 5/22 #4   HENT: Negative. Dental ex reg    Eyes: Negative. Negative for visual disturbance. Eye exam    scheduled 12/22   Respiratory:  Negative for cough, chest tightness, shortness of breath and wheezing. Does not smoke . Rare wine     Asthma  >>   Cardiovascular:         HTN  2009     No known CAD . No FH either     Fairly active    Gastrointestinal: Negative. Negative for abdominal distention, abdominal pain, blood in stool, constipation and diarrhea. No FH of Ca colon     Colonoscopy 2018    Endocrine:        No FH of diabetes    Genitourinary:  Positive for frequency (with Labetalol ? ??). Negative for dysuria, menstrual problem and vaginal discharge. Natural menopause 2017     Last pelvic / pap 2017     One 16 yr old  . Gestational Diabetes     Mammogram 6/22   Musculoskeletal:  Arthralgias: left ankle injury 12/22. S/P  IA injec/ knee 2021   better     H/O Torn Meniscus rt    Allergic/Immunologic: Positive for environmental allergies. Negative for food allergies. Neurological:  Negative for dizziness, weakness and headaches. Psychiatric/Behavioral:  Negative for behavioral problems, dysphoric mood and sleep disturbance. The patient is not nervous/anxious. Objective:   Physical Exam  Vitals reviewed. Constitutional:       General: She is not in acute distress. Appearance: She is obese. Comments: Vitals as noted 3/12/23   Cardiovascular:      Rate and Rhythm: Normal rate and regular rhythm. Heart sounds: Normal heart sounds. Pulmonary:      Breath sounds: Normal breath sounds. Abdominal:      General: There is distension. Musculoskeletal:         General: Normal range of motion. Skin:     General: Skin is warm.    Neurological:

## 2023-08-03 DIAGNOSIS — I10 ESSENTIAL HYPERTENSION: ICD-10-CM

## 2023-08-03 NOTE — TELEPHONE ENCOUNTER
Medication:   Requested Prescriptions     Pending Prescriptions Disp Refills    labetalol (NORMODYNE) 100 MG tablet [Pharmacy Med Name: LABETALOL  MG TABLET] 180 tablet 1     Sig: TAKE 1 TABLET BY MOUTH TWICE A DAY        Last Filled:      Patient Phone Number: [phone] (home) [phone] (work)    Last appt: 6/13/2023   Next appt: Visit date not found    Last OARRS: No flowsheet data found.

## 2023-08-04 RX ORDER — LABETALOL 100 MG/1
TABLET, FILM COATED ORAL
Qty: 180 TABLET | Refills: 1 | Status: SHIPPED | OUTPATIENT
Start: 2023-08-04

## 2023-08-11 ENCOUNTER — TELEPHONE (OUTPATIENT)
Dept: PRIMARY CARE CLINIC | Age: 63
End: 2023-08-11

## 2023-08-11 NOTE — TELEPHONE ENCOUNTER
PT called in regarding her prescription for Analisa Brewster. She says that the pharmacy adv her that Dr. Laura Lowery only refilled for 30 days and that will cost her $60 instead of the $10 for 90 days. PT would like for us to reach out to her pharmacy to look into this.      Please call back: [phone]

## 2023-08-15 NOTE — TELEPHONE ENCOUNTER
Called Pharmacy about medication. They switched it over from 30 day to 90 day.   Called patient and left message on machine telling pt that the rx was switched for a cheaper price

## 2023-08-19 DIAGNOSIS — K21.9 GASTROESOPHAGEAL REFLUX DISEASE WITHOUT ESOPHAGITIS: ICD-10-CM

## 2023-08-19 DIAGNOSIS — R05.8 OTHER COUGH: ICD-10-CM

## 2023-08-21 RX ORDER — PANTOPRAZOLE SODIUM 40 MG/1
TABLET, DELAYED RELEASE ORAL
Qty: 60 TABLET | Refills: 5 | Status: SHIPPED | OUTPATIENT
Start: 2023-08-21

## 2023-08-21 NOTE — TELEPHONE ENCOUNTER
Medication:   Requested Prescriptions     Pending Prescriptions Disp Refills    pantoprazole (PROTONIX) 40 MG tablet [Pharmacy Med Name: PANTOPRAZOLE SOD DR 40 MG TAB] 60 tablet 5     Sig: TAKE 1 TABLET BY MOUTH IN THE MORNING AND IN THE EVENING        Last Filled:      Patient Phone Number: [phone] (home) [phone] (work)    Last appt: 6/13/2023   Next appt: Visit date not found    Last OARRS: No flowsheet data found.

## 2023-09-14 DIAGNOSIS — K21.9 GASTROESOPHAGEAL REFLUX DISEASE WITHOUT ESOPHAGITIS: ICD-10-CM

## 2023-09-14 NOTE — TELEPHONE ENCOUNTER
Medication:   Requested Prescriptions     Pending Prescriptions Disp Refills    pantoprazole (PROTONIX) 40 MG tablet [Pharmacy Med Name: PANTOPRAZOLE SOD DR 40 MG TAB] 90 tablet 1     Sig: TAKE 1 TABLET BY MOUTH EVERY DAY BEFORE BREAKFAST        Last Filled:      Patient Phone Number: [phone] (home) [phone] (work)    Last appt: 6/13/2023   Next appt: Visit date not found    Last OARRS:        No data to display

## 2023-09-15 RX ORDER — PANTOPRAZOLE SODIUM 40 MG/1
TABLET, DELAYED RELEASE ORAL
Qty: 90 TABLET | Refills: 1 | Status: SHIPPED | OUTPATIENT
Start: 2023-09-15

## 2023-09-29 ENCOUNTER — OFFICE VISIT (OUTPATIENT)
Dept: PRIMARY CARE CLINIC | Age: 63
End: 2023-09-29
Payer: COMMERCIAL

## 2023-09-29 VITALS
TEMPERATURE: 97.9 F | SYSTOLIC BLOOD PRESSURE: 136 MMHG | DIASTOLIC BLOOD PRESSURE: 77 MMHG | OXYGEN SATURATION: 98 % | HEART RATE: 65 BPM | HEIGHT: 65 IN | BODY MASS INDEX: 40.42 KG/M2 | WEIGHT: 242.6 LBS

## 2023-09-29 DIAGNOSIS — R63.8 WEIGHT DISORDER: ICD-10-CM

## 2023-09-29 DIAGNOSIS — Z29.11 NEED FOR RSV VACCINATION: ICD-10-CM

## 2023-09-29 DIAGNOSIS — R73.03 PREDIABETES: ICD-10-CM

## 2023-09-29 DIAGNOSIS — I10 ESSENTIAL HYPERTENSION: ICD-10-CM

## 2023-09-29 DIAGNOSIS — Z23 NEED FOR PNEUMOCOCCAL VACCINATION: ICD-10-CM

## 2023-09-29 DIAGNOSIS — Z23 FLU VACCINE NEED: Primary | ICD-10-CM

## 2023-09-29 DIAGNOSIS — G58.9 COMPRESSION NEUROPATHY: ICD-10-CM

## 2023-09-29 DIAGNOSIS — Z23 NEED FOR COVID-19 VACCINE: ICD-10-CM

## 2023-09-29 PROCEDURE — 3078F DIAST BP <80 MM HG: CPT | Performed by: INTERNAL MEDICINE

## 2023-09-29 PROCEDURE — 99214 OFFICE O/P EST MOD 30 MIN: CPT | Performed by: INTERNAL MEDICINE

## 2023-09-29 PROCEDURE — 3075F SYST BP GE 130 - 139MM HG: CPT | Performed by: INTERNAL MEDICINE

## 2023-09-29 RX ORDER — SEMAGLUTIDE 1.34 MG/ML
0.25 INJECTION, SOLUTION SUBCUTANEOUS WEEKLY
Qty: 6 ADJUSTABLE DOSE PRE-FILLED PEN SYRINGE | Refills: 4 | Status: SHIPPED | OUTPATIENT
Start: 2023-09-29

## 2023-09-29 ASSESSMENT — ENCOUNTER SYMPTOMS
CONSTIPATION: 0
WHEEZING: 0
EYES NEGATIVE: 1
CHEST TIGHTNESS: 0
ABDOMINAL DISTENTION: 0
SHORTNESS OF BREATH: 0
COUGH: 0
BLOOD IN STOOL: 0
DIARRHEA: 0
GASTROINTESTINAL NEGATIVE: 1
ABDOMINAL PAIN: 0

## 2023-09-29 NOTE — PROGRESS NOTES
Breath sounds: Normal breath sounds. Abdominal:      General: There is distension. Musculoskeletal:         General: Normal range of motion. Skin:     General: Skin is warm. Neurological:      Mental Status: She is oriented to person, place, and time. Sensory: Sensation is intact. No sensory deficit. Motor: Motor function is intact. Coordination: Coordination is intact. Psychiatric:         Behavior: Behavior normal.         Assessment:   Юлия was seen today for numbness and arm pain. Diagnoses and all orders for this visit:    Flu vaccine need  -     Influenza, FLUCELVAX, (age 10 mo+), IM, Preservative Free, 0.5 mL    Need for COVID-19 vaccine  Vac at Pharmacy     Need for RSV vaccination    Need for pneumococcal vaccination    Weight disorder    Continue low carb Diet + Intermittent Fasting  . Try Ozempic 9/23   -     Semaglutide,0.25 or 0.5MG/DOS, (OZEMPIC, 0.25 OR 0.5 MG/DOSE,) 2 MG/1.5ML SOPN; Inject 0.25 mg into the skin once a week    Compression neuropathy   do not sit long in same position ! Reduce wt     Prediabetes  -     Semaglutide,0.25 or 0.5MG/DOS, (OZEMPIC, 0.25 OR 0.5 MG/DOSE,) 2 MG/1.5ML SOPN; Inject 0.25 mg into the skin once a week    Essential hypertension  -     amLODIPine (NORVASC) 5 MG tablet; Take 1 tablet by mouth daily  -     candesartan (ATACAND) 32 MG tablet;  Take 1 tablet by mouth daily                            Plan:           Edson Joseph MD

## 2023-10-02 ENCOUNTER — TELEPHONE (OUTPATIENT)
Dept: ADMINISTRATIVE | Age: 63
End: 2023-10-02

## 2023-10-02 NOTE — TELEPHONE ENCOUNTER
Submitted PA for [facility]  Via CMM  (Key: [id number]) STATUS: PENDING. Follow up done daily; if no response in three days we will refax for status check. If another three days goes by with no response we will call the insurance for status.

## 2023-10-03 NOTE — TELEPHONE ENCOUNTER
The medication was DENIED; DENIAL letter uploaded to MED[employer]. If you want an APPEAL; please note in this encounter what new information you would like to APPEAL with. Once complete route back to PA POOL. If this requires a response please respond to the pool ( [phone]). Thank you please advise patient.

## 2023-10-23 ENCOUNTER — APPOINTMENT (OUTPATIENT)
Dept: GENERAL RADIOLOGY | Age: 63
End: 2023-10-23
Payer: COMMERCIAL

## 2023-10-23 ENCOUNTER — HOSPITAL ENCOUNTER (EMERGENCY)
Age: 63
Discharge: HOME | End: 2023-10-23
Attending: EMERGENCY MEDICINE
Payer: COMMERCIAL

## 2023-10-23 VITALS
HEIGHT: 64 IN | WEIGHT: 243.6 LBS | RESPIRATION RATE: 16 BRPM | OXYGEN SATURATION: 96 % | HEART RATE: 74 BPM | DIASTOLIC BLOOD PRESSURE: 87 MMHG | BODY MASS INDEX: 41.59 KG/M2 | TEMPERATURE: 98 F | SYSTOLIC BLOOD PRESSURE: 175 MMHG

## 2023-10-23 DIAGNOSIS — S40.012A CONTUSION OF LEFT SHOULDER, INITIAL ENCOUNTER: Primary | ICD-10-CM

## 2023-10-23 PROCEDURE — 6370000000 HC RX 637 (ALT 250 FOR IP): Performed by: EMERGENCY MEDICINE

## 2023-10-23 PROCEDURE — 99283 EMERGENCY DEPT VISIT LOW MDM: CPT

## 2023-10-23 PROCEDURE — 73030 X-RAY EXAM OF SHOULDER: CPT

## 2023-10-23 RX ORDER — METHOCARBAMOL 500 MG/1
1000 TABLET, FILM COATED ORAL ONCE
Status: COMPLETED | OUTPATIENT
Start: 2023-10-23 | End: 2023-10-23

## 2023-10-23 RX ORDER — IBUPROFEN 800 MG/1
800 TABLET ORAL EVERY 8 HOURS PRN
Qty: 20 TABLET | Refills: 0 | Status: SHIPPED | OUTPATIENT
Start: 2023-10-23 | End: 2023-11-02

## 2023-10-23 RX ORDER — METHOCARBAMOL 750 MG/1
750 TABLET, FILM COATED ORAL 3 TIMES DAILY PRN
Qty: 15 TABLET | Refills: 0 | Status: SHIPPED | OUTPATIENT
Start: 2023-10-23 | End: 2023-10-28

## 2023-10-23 RX ORDER — LIDOCAINE 4 G/G
1 PATCH TOPICAL ONCE
Status: DISCONTINUED | OUTPATIENT
Start: 2023-10-23 | End: 2023-10-23 | Stop reason: HOSPADM

## 2023-10-23 RX ORDER — IBUPROFEN 800 MG/1
800 TABLET ORAL ONCE
Status: COMPLETED | OUTPATIENT
Start: 2023-10-23 | End: 2023-10-23

## 2023-10-23 RX ADMIN — IBUPROFEN 800 MG: 800 TABLET, FILM COATED ORAL at 19:46

## 2023-10-23 RX ADMIN — METHOCARBAMOL 1000 MG: 500 TABLET ORAL at 19:33

## 2023-10-23 ASSESSMENT — PAIN DESCRIPTION - ORIENTATION
ORIENTATION: LEFT;ANTERIOR;UPPER
ORIENTATION: LEFT

## 2023-10-23 ASSESSMENT — PAIN SCALES - GENERAL
PAINLEVEL_OUTOF10: 8
PAINLEVEL_OUTOF10: 8

## 2023-10-23 ASSESSMENT — PAIN - FUNCTIONAL ASSESSMENT
PAIN_FUNCTIONAL_ASSESSMENT: 0-10
PAIN_FUNCTIONAL_ASSESSMENT: ACTIVITIES ARE NOT PREVENTED

## 2023-10-23 ASSESSMENT — PAIN DESCRIPTION - DESCRIPTORS: DESCRIPTORS: PRESSURE

## 2023-10-23 ASSESSMENT — PAIN DESCRIPTION - LOCATION
LOCATION: SHOULDER;NECK
LOCATION: SHOULDER

## 2023-10-23 NOTE — DISCHARGE INSTRUCTIONS
Alternate ice and heat. Return for numbness or weakness to hand or arm, increased pain, redness or increased swelling. Followup with orthopedics if not improving in 1 week.  MRI can be used to see rotator cuff or ligament injuries

## 2023-10-23 NOTE — ED NOTES
Pt dc/d with instructions in stable condition, ambulatory to lobby. home per ride.      Verónica Jones RN  10/23/23 1954

## 2023-10-23 NOTE — ED PROVIDER NOTES
[facility] EMERGENCY DEPT VISIT      Patient Identification  Annei Murphy is a 66 y.o. female. Chief Complaint   Shoulder Injury (Pt presents with L shoulder pain since Saturday. Hit L shoulder on countertop. Pain radiates to back of neck and to L bicep. Hurts to apply a backpack )      History of Present Illness:    History was obtained from patient. This is a  66 y.o. female who presents ambulatory  to the ED with complaints of left nondominant shoulder pain. Patient states that she was reaching under a countertop putting things away when she realized she was about to hit her head on the countertop so moved quickly and instead her left shoulder got caught up on the counter. Patient believes it just hit the counter rather than twisted the shoulder but she is not sure. She has had ongoing pain in the left shoulder since. She has tried ice and heat but is taken no medications. It radiates into the left scapular area and slightly into the left upper arm. No numbness or weakness to the arm or hand. No chest pain. Pain is not pleuritic. She tried to put a backpack on today and it was very painful. No previous shoulder problems. .     Past Medical History:   Diagnosis Date    GERD (gastroesophageal reflux disease)     Gestational diabetes     Hypertension     Morbid obesity due to excess calories ([address]) 2016    Severe persistent asthma 2019    Vitamin D deficiency     Xerosis cutis        Past Surgical History:   Procedure Laterality Date     SECTION           Current Facility-Administered Medications:     lidocaine 4 % external patch 1 patch, 1 patch, TransDERmal, Once, Remberto Jay MD, 1 patch at 10/23/23 1932    Current Outpatient Medications:     ibuprofen (IBU) 800 MG tablet, Take 1 tablet by mouth every 8 hours as needed for Pain, Disp: 20 tablet, Rfl: 0    methocarbamol (ROBAXIN-750) 750 MG tablet, Take 1 tablet by mouth 3 times daily as needed (muscle spasms), Disp: 15 tablet,

## 2023-12-13 ENCOUNTER — APPOINTMENT (OUTPATIENT)
Dept: CT IMAGING | Age: 63
End: 2023-12-13
Payer: COMMERCIAL

## 2023-12-13 ENCOUNTER — HOSPITAL ENCOUNTER (EMERGENCY)
Age: 63
Discharge: HOME | End: 2023-12-13
Attending: EMERGENCY MEDICINE
Payer: COMMERCIAL

## 2023-12-13 VITALS
TEMPERATURE: 98.3 F | DIASTOLIC BLOOD PRESSURE: 77 MMHG | RESPIRATION RATE: 16 BRPM | BODY MASS INDEX: 38.45 KG/M2 | HEIGHT: 67 IN | HEART RATE: 63 BPM | SYSTOLIC BLOOD PRESSURE: 129 MMHG | OXYGEN SATURATION: 98 % | WEIGHT: 245 LBS

## 2023-12-13 DIAGNOSIS — S39.012A STRAIN OF LUMBAR REGION, INITIAL ENCOUNTER: Primary | ICD-10-CM

## 2023-12-13 PROCEDURE — 72131 CT LUMBAR SPINE W/O DYE: CPT

## 2023-12-13 PROCEDURE — 99284 EMERGENCY DEPT VISIT MOD MDM: CPT

## 2023-12-13 RX ORDER — NAPROXEN 500 MG/1
500 TABLET ORAL 2 TIMES DAILY
Qty: 20 TABLET | Refills: 0 | Status: SHIPPED | OUTPATIENT
Start: 2023-12-13 | End: 2023-12-23

## 2023-12-13 RX ORDER — LIDOCAINE 50 MG/G
1 PATCH TOPICAL DAILY
Qty: 30 PATCH | Refills: 0 | Status: SHIPPED | OUTPATIENT
Start: 2023-12-13

## 2023-12-13 RX ORDER — CYCLOBENZAPRINE HCL 10 MG
10 TABLET ORAL 3 TIMES DAILY PRN
Qty: 15 TABLET | Refills: 0 | Status: SHIPPED | OUTPATIENT
Start: 2023-12-13 | End: 2023-12-23

## 2023-12-13 ASSESSMENT — LIFESTYLE VARIABLES
HOW OFTEN DO YOU HAVE A DRINK CONTAINING ALCOHOL: NEVER
HOW MANY STANDARD DRINKS CONTAINING ALCOHOL DO YOU HAVE ON A TYPICAL DAY: PATIENT DOES NOT DRINK

## 2023-12-13 ASSESSMENT — PAIN SCALES - GENERAL: PAINLEVEL_OUTOF10: 8

## 2023-12-13 ASSESSMENT — PAIN - FUNCTIONAL ASSESSMENT: PAIN_FUNCTIONAL_ASSESSMENT: 0-10

## 2023-12-14 DIAGNOSIS — I10 ESSENTIAL HYPERTENSION: ICD-10-CM

## 2023-12-14 NOTE — TELEPHONE ENCOUNTER
----- Message from Gladis Montgomery sent at 12/8/2023  1:53 PM EST -----  Subject: Refill Request    QUESTIONS  Name of Medication? amLODIPine (NORVASC) 5 MG tablet  Patient-reported dosage and instructions? Once daily   How many days do you have left? 0  [facility]? [id number]  Pharmacy phone number (if available)? [phone]  Additional Information for Provider? Patient states pharmacy will not   refill without a prior authorization.   ---------------------------------------------------------------------------  --------------  CALL BACK INFO  What is the best way for the office to contact you? OK to leave message on   voicemail  Preferred Call Back Phone Number? [phone]  ---------------------------------------------------------------------------  --------------  SCRIPT ANSWERS  Relationship to Patient?  Self

## 2023-12-14 NOTE — DISCHARGE INSTRUCTIONS
Discharge home  Naprosyn  Flexeril  Lidoderm  Alternate ice and heat  Follow-up with your family doctor

## 2023-12-14 NOTE — ED TRIAGE NOTES
Pt states she slid from couch to floor  while putting on shoes. Pt ambulatory without noted gait problems. Pt denies additional pain while ambulating. Pt c/o tailbone pain. Denies blood thinners, denies getting dizzy or light headed before sliding from couch.  Pt doing standing toe-touches during triage \"to stretch my back\"

## 2023-12-14 NOTE — TELEPHONE ENCOUNTER
Medication:   Requested Prescriptions     Pending Prescriptions Disp Refills    amLODIPine (NORVASC) 5 MG tablet 90 tablet 4     Sig: Take 1 tablet by mouth daily        Last Filled:      Patient Phone Number: [phone] (home) [phone] (work)    Last appt: 9/29/2023   Next appt: 12/22/2023    Last OARRS:        No data to display

## 2023-12-14 NOTE — ED PROVIDER NOTES
note were completed with a voice recognition program.  Efforts were made to edit the dictations but occasionally words are mis-transcribed.)    Rony Solis MD (electronically signed)       Rony Solis MD  12/13/23 0186

## 2023-12-15 RX ORDER — AMLODIPINE BESYLATE 5 MG/1
5 TABLET ORAL DAILY
Qty: 90 TABLET | Refills: 4 | Status: SHIPPED | OUTPATIENT
Start: 2023-12-15

## 2023-12-26 DIAGNOSIS — K21.9 GASTROESOPHAGEAL REFLUX DISEASE WITHOUT ESOPHAGITIS: ICD-10-CM

## 2023-12-26 RX ORDER — PANTOPRAZOLE SODIUM 40 MG/1
TABLET, DELAYED RELEASE ORAL
Qty: 90 TABLET | Refills: 1 | Status: SHIPPED | OUTPATIENT
Start: 2023-12-26

## 2023-12-26 NOTE — TELEPHONE ENCOUNTER
Medication:   Requested Prescriptions     Pending Prescriptions Disp Refills    pantoprazole (PROTONIX) 40 MG tablet [Pharmacy Med Name: PANTOPRAZOLE SOD DR 40 MG TAB] 90 tablet 1     Sig: TAKE 1 TABLET BY MOUTH EVERY DAY BEFORE BREAKFAST        Last Filled:      Patient Phone Number: [phone] (home) [phone] (work)    Last appt: 12/22/2023   Next appt: Visit date not found    Last OARRS:        No data to display

## 2023-12-27 ENCOUNTER — TELEPHONE (OUTPATIENT)
Dept: ADMINISTRATIVE | Age: 63
End: 2023-12-27

## 2023-12-27 NOTE — TELEPHONE ENCOUNTER
Submitted PA for OZEMPIC 4MG/3ML   Via [facility]  (Key: [id number]) STATUS: PENDING.    Follow up done daily; if no decision with in three days we will refax.  If another three days goes by with no decision will call the insurance for status.

## 2023-12-28 ENCOUNTER — TELEPHONE (OUTPATIENT)
Dept: PRIMARY CARE CLINIC | Age: 63
End: 2023-12-28

## 2023-12-28 NOTE — TELEPHONE ENCOUNTER
----- Message from Mika Musasch sent at 12/28/2023  3:00 PM EST -----  Subject: Message to Provider    QUESTIONS  Information for Provider? [facility] not [facility] in for her Semaglutide  ---------------------------------------------------------------------------  --------------  [address]  [phone]; OK to leave message on voicemail  ---------------------------------------------------------------------------  --------------  SCRIPT ANSWERS  Relationship to Patient?  Self

## 2024-01-29 DIAGNOSIS — R25.2 MUSCLE CRAMPS: ICD-10-CM

## 2024-01-29 RX ORDER — CAL/D3/MAG11/ZINC/COP/MANG/BOR 600 MG-800
TABLET ORAL
Qty: 60 TABLET | Refills: 11 | Status: SHIPPED | OUTPATIENT
Start: 2024-01-29

## 2024-01-29 NOTE — TELEPHONE ENCOUNTER
Medication:   Requested Prescriptions     Pending Prescriptions Disp Refills    Caltrate 600+D Plus Minerals (CALTRATE) 600-800 MG-UNIT TABS tablet [Pharmacy Med Name: CALTRATE 600+D PLUS TABLET] 60 tablet 11     Sig: TAKE 1 TABLET BY MOUTH 2 TIMES DAILY. IF STILL OOS IN 1 WEEK, REQUEST A DIFFERENT STRENGTH FROM DOC        Last Filled:      Patient Phone Number: [phone] (home) [phone] (work)    Last appt: 12/22/2023   Next appt: Visit date not found    Last OARRS:        No data to display

## 2024-01-30 NOTE — TELEPHONE ENCOUNTER
Medication:   Requested Prescriptions     Pending Prescriptions Disp Refills    diclofenac sodium (VOLTAREN) 1 % GEL [Pharmacy Med Name: DICLOFENAC SODIUM 1% GEL] 100 g      Sig: APPLY 4 G TOPICALLY 4 TIMES A DAY        Last Filled:      Patient Phone Number: [phone] (home) [phone] (work)    Last appt: 12/22/2023   Next appt: Visit date not found    Last OARRS:        No data to display

## 2024-01-31 NOTE — TELEPHONE ENCOUNTER
Medication:   Requested Prescriptions     Pending Prescriptions Disp Refills    Azelaic Acid 15 % GEL 50 g 2     Sig: APPLY TOPICALLY ONCE DAILY FOR ACNE        Last Filled:      Patient Phone Number: [phone] (home) [phone] (work)    Last appt: 12/22/2023   Next appt: Visit date not found    Last OARRS:        No data to display

## 2024-02-01 RX ORDER — AZELAIC ACID 0.15 G/G
GEL TOPICAL
Qty: 50 G | Refills: 2 | Status: SHIPPED | OUTPATIENT
Start: 2024-02-01

## 2024-03-18 ENCOUNTER — APPOINTMENT (OUTPATIENT)
Dept: GENERAL RADIOLOGY | Age: 64
End: 2024-03-18
Payer: COMMERCIAL

## 2024-03-18 ENCOUNTER — HOSPITAL ENCOUNTER (EMERGENCY)
Age: 64
Discharge: HOME | End: 2024-03-18
Attending: EMERGENCY MEDICINE
Payer: COMMERCIAL

## 2024-03-18 VITALS
DIASTOLIC BLOOD PRESSURE: 98 MMHG | BODY MASS INDEX: 40.31 KG/M2 | SYSTOLIC BLOOD PRESSURE: 184 MMHG | TEMPERATURE: 98.3 F | RESPIRATION RATE: 18 BRPM | HEART RATE: 80 BPM | WEIGHT: 257.4 LBS | OXYGEN SATURATION: 99 %

## 2024-03-18 DIAGNOSIS — S20.212A CONTUSION OF RIB ON LEFT SIDE, INITIAL ENCOUNTER: Primary | ICD-10-CM

## 2024-03-18 PROCEDURE — 6360000002 HC RX W HCPCS: Performed by: EMERGENCY MEDICINE

## 2024-03-18 PROCEDURE — 6370000000 HC RX 637 (ALT 250 FOR IP): Performed by: EMERGENCY MEDICINE

## 2024-03-18 PROCEDURE — 99284 EMERGENCY DEPT VISIT MOD MDM: CPT

## 2024-03-18 PROCEDURE — 96372 THER/PROPH/DIAG INJ SC/IM: CPT

## 2024-03-18 PROCEDURE — 71101 X-RAY EXAM UNILAT RIBS/CHEST: CPT

## 2024-03-18 RX ORDER — ACETAMINOPHEN 500 MG
1000 TABLET ORAL
Status: COMPLETED | OUTPATIENT
Start: 2024-03-18 | End: 2024-03-18

## 2024-03-18 RX ORDER — KETOROLAC TROMETHAMINE 30 MG/ML
15 INJECTION, SOLUTION INTRAMUSCULAR; INTRAVENOUS ONCE
Status: COMPLETED | OUTPATIENT
Start: 2024-03-18 | End: 2024-03-18

## 2024-03-18 RX ORDER — LIDOCAINE 4 G/G
1 PATCH TOPICAL DAILY
Status: DISCONTINUED | OUTPATIENT
Start: 2024-03-18 | End: 2024-03-18 | Stop reason: HOSPADM

## 2024-03-18 RX ADMIN — ACETAMINOPHEN 1000 MG: 500 TABLET ORAL at 19:56

## 2024-03-18 RX ADMIN — KETOROLAC TROMETHAMINE 15 MG: 30 INJECTION, SOLUTION INTRAMUSCULAR; INTRAVENOUS at 19:57

## 2024-03-18 ASSESSMENT — PAIN SCALES - GENERAL: PAINLEVEL_OUTOF10: 1

## 2024-03-19 NOTE — ED PROVIDER NOTES
THE [facility]  EMERGENCY DEPARTMENT ENCOUNTER          ATTENDING PHYSICIAN NOTE       Date of evaluation: 3/18/2024    Chief Complaint     Fall and Rib Pain (Pt. Presents to ED with c/o left sided rib pain. States she attempted to use a computer chair with wheels to help push herself up from the ground when the chair moved and her left arm got caught on it. )      History of Present Illness     Юлия Barahona is a 63 y.o. female who presents ***    ASSESSMENT / PLAN  (MEDICAL DECISION MAKING)     INITIAL VITALS: BP: (!) 184/98, Temp: 98.3 °F (36.8 °C), Pulse: 80, Respirations: 18, SpO2: 99 %      Юлия Barahona is a 63 y.o. female ***.    Is this patient to be included in the SEP-1 core measure? {Sep-1 Core Yes/No:133233}    Medical Decision Making  Amount and/or Complexity of Data Reviewed  Radiology: ordered.    Risk  OTC drugs.  Prescription drug management.        Critical Care:  Due to the immediate potential for life-threatening deterioration due to ***, I spent *** minutes providing critical care.  This time excludes time spent performing procedures but includes time spent on direct patient care, history retrieval, review of the chart, and discussions with patient, family, and consultant(s).    Clinical Impression     No diagnosis found.    Disposition     PATIENT REFERRED TO:  No follow-up provider specified.    DISCHARGE MEDICATIONS:  New Prescriptions    No medications on file       DISPOSITION          Diagnostic Results and Other Data       RADIOLOGY:  XR RIBS LEFT INCLUDE CHEST (MIN 3 VIEWS)   Final Result      No acute cardiopulmonary findings.   No displaced rib fracture.      Electronically signed by Heladio Goetz MD          LABS:   No results found for this visit on 03/18/24.  EKG   ***    ED BEDSIDE ULTRASOUND:  No results found.    MOST RECENT VITALS:  BP: (!) 184/98,Temp: 98.3 °F (36.8 °C), Pulse: 80, Respirations: 18, SpO2: 99 %     Procedures     ***    ED Course     Nursing Notes, Past  020

## 2024-03-27 ENCOUNTER — TELEPHONE (OUTPATIENT)
Dept: PRIMARY CARE CLINIC | Age: 64
End: 2024-03-27

## 2024-03-27 NOTE — TELEPHONE ENCOUNTER
----- Message from Chelly John Jr. sent at 3/27/2024 10:03 AM EDT -----  Regarding: ECC Appointment Request  ECC Appointment Request    Patient needs appointment for ECC Appointment Type: Existing Condition Follow Up.    Reason for Appointment Request: Available appointments did not meet patient need  Patient wants to have an appointment for Friday anytime in the morning   --------------------------------------------------------------------------------------------------------------------------    Relationship to Patient: Self     Call Back Information: OK to leave message on voicemail  Preferred Call Back Number: Phone [phone]

## 2024-04-18 SDOH — ECONOMIC STABILITY: INCOME INSECURITY: HOW HARD IS IT FOR YOU TO PAY FOR THE VERY BASICS LIKE FOOD, HOUSING, MEDICAL CARE, AND HEATING?: NOT HARD AT ALL

## 2024-04-18 SDOH — ECONOMIC STABILITY: FOOD INSECURITY: WITHIN THE PAST 12 MONTHS, THE FOOD YOU BOUGHT JUST DIDN'T LAST AND YOU DIDN'T HAVE MONEY TO GET MORE.: NEVER TRUE

## 2024-04-18 SDOH — ECONOMIC STABILITY: FOOD INSECURITY: WITHIN THE PAST 12 MONTHS, YOU WORRIED THAT YOUR FOOD WOULD RUN OUT BEFORE YOU GOT MONEY TO BUY MORE.: NEVER TRUE

## 2024-04-18 SDOH — ECONOMIC STABILITY: TRANSPORTATION INSECURITY
IN THE PAST 12 MONTHS, HAS LACK OF TRANSPORTATION KEPT YOU FROM MEETINGS, WORK, OR FROM GETTING THINGS NEEDED FOR DAILY LIVING?: NO

## 2024-04-18 ASSESSMENT — PATIENT HEALTH QUESTIONNAIRE - PHQ9
2. FEELING DOWN, DEPRESSED OR HOPELESS: NOT AT ALL
SUM OF ALL RESPONSES TO PHQ QUESTIONS 1-9: 0
SUM OF ALL RESPONSES TO PHQ9 QUESTIONS 1 & 2: 0
SUM OF ALL RESPONSES TO PHQ QUESTIONS 1-9: 0
1. LITTLE INTEREST OR PLEASURE IN DOING THINGS: NOT AT ALL
SUM OF ALL RESPONSES TO PHQ QUESTIONS 1-9: 0
2. FEELING DOWN, DEPRESSED OR HOPELESS: NOT AT ALL
SUM OF ALL RESPONSES TO PHQ9 QUESTIONS 1 & 2: 0
SUM OF ALL RESPONSES TO PHQ QUESTIONS 1-9: 0
1. LITTLE INTEREST OR PLEASURE IN DOING THINGS: NOT AT ALL

## 2024-04-19 ENCOUNTER — OFFICE VISIT (OUTPATIENT)
Dept: PRIMARY CARE CLINIC | Age: 64
End: 2024-04-19
Payer: COMMERCIAL

## 2024-04-19 VITALS
OXYGEN SATURATION: 99 % | HEART RATE: 64 BPM | BODY MASS INDEX: 39.94 KG/M2 | SYSTOLIC BLOOD PRESSURE: 135 MMHG | WEIGHT: 255 LBS | DIASTOLIC BLOOD PRESSURE: 70 MMHG

## 2024-04-19 DIAGNOSIS — E66.01 SEVERE OBESITY (BMI 35.0-39.9) WITH COMORBIDITY (HCC): ICD-10-CM

## 2024-04-19 DIAGNOSIS — K21.9 GASTROESOPHAGEAL REFLUX DISEASE WITHOUT ESOPHAGITIS: ICD-10-CM

## 2024-04-19 DIAGNOSIS — Z23 NEED FOR PNEUMOCOCCAL VACCINATION: Primary | ICD-10-CM

## 2024-04-19 DIAGNOSIS — M94.0 COSTOCHONDRITIS: ICD-10-CM

## 2024-04-19 DIAGNOSIS — I10 ESSENTIAL HYPERTENSION: ICD-10-CM

## 2024-04-19 PROBLEM — J45.50 SEVERE PERSISTENT ASTHMA: Status: RESOLVED | Noted: 2019-08-02 | Resolved: 2024-04-19

## 2024-04-19 PROBLEM — E11.9 TYPE 2 DIABETES MELLITUS (HCC): Status: ACTIVE | Noted: 2024-04-19

## 2024-04-19 PROCEDURE — 3075F SYST BP GE 130 - 139MM HG: CPT | Performed by: INTERNAL MEDICINE

## 2024-04-19 PROCEDURE — 99214 OFFICE O/P EST MOD 30 MIN: CPT | Performed by: INTERNAL MEDICINE

## 2024-04-19 PROCEDURE — 3078F DIAST BP <80 MM HG: CPT | Performed by: INTERNAL MEDICINE

## 2024-04-19 RX ORDER — CELECOXIB 200 MG/1
200 CAPSULE ORAL DAILY
Qty: 30 CAPSULE | Refills: 1 | Status: SHIPPED | OUTPATIENT
Start: 2024-04-19

## 2024-04-19 RX ORDER — PANTOPRAZOLE SODIUM 40 MG/1
40 TABLET, DELAYED RELEASE ORAL
Qty: 90 TABLET | Refills: 1 | Status: SHIPPED | OUTPATIENT
Start: 2024-04-19

## 2024-04-19 RX ORDER — ACETAMINOPHEN 500 MG
500 TABLET ORAL EVERY 6 HOURS PRN
Qty: 90 TABLET | Refills: 0 | Status: SHIPPED | OUTPATIENT
Start: 2024-04-19

## 2024-04-19 RX ORDER — AMLODIPINE BESYLATE 5 MG/1
5 TABLET ORAL DAILY
Qty: 90 TABLET | Refills: 4 | Status: SHIPPED | OUTPATIENT
Start: 2024-04-19

## 2024-04-19 RX ORDER — CANDESARTAN 32 MG/1
32 TABLET ORAL DAILY
Qty: 90 TABLET | Refills: 4 | Status: SHIPPED | OUTPATIENT
Start: 2024-04-19

## 2024-04-19 SDOH — ECONOMIC STABILITY: FOOD INSECURITY: WITHIN THE PAST 12 MONTHS, THE FOOD YOU BOUGHT JUST DIDN'T LAST AND YOU DIDN'T HAVE MONEY TO GET MORE.: NEVER TRUE

## 2024-04-19 SDOH — ECONOMIC STABILITY: FOOD INSECURITY: WITHIN THE PAST 12 MONTHS, YOU WORRIED THAT YOUR FOOD WOULD RUN OUT BEFORE YOU GOT MONEY TO BUY MORE.: NEVER TRUE

## 2024-04-19 ASSESSMENT — ENCOUNTER SYMPTOMS
GASTROINTESTINAL NEGATIVE: 1
WHEEZING: 0
ABDOMINAL PAIN: 0
CONSTIPATION: 0
SHORTNESS OF BREATH: 0
EYES NEGATIVE: 1
BLOOD IN STOOL: 0
CHEST TIGHTNESS: 0
ABDOMINAL DISTENTION: 0
COUGH: 0
DIARRHEA: 0

## 2024-04-19 NOTE — PATIENT INSTRUCTIONS
[facility]   What they offer:  Rent and utility assistance     Phone Number: [phone]  Address: [address] [address]   Website: [email or web address]      [facility]  What they offer:  Rent and utility assistance     Phone Number: [phone]  Address: [address] [place], [place] [address]   Website: [email or web address]://[email or web address].org/sitepages/HOME.html           Financial Resources        Emergency Rental Assistance:   Coalition on Homelessness and Housing in [place]: Cite provides list of Community Resources that assist with emergency rental assistance for all [facility] in [place]  https://Booktropehio.org/            How to apply for Medicaid or Health Insurance   Online:  Apply online at: https://benefits.[place].gov/    Phone:  By calling your area Job and Family Services:   [email or web address]/[email or web address]    Online:  Apply at Health Insurance Marketplace:     Mail or drop off a paper application to your local Department of .  Applications can be downloaded and printed here: https://benefits.[place].gov/   Mailing may take longer than other methods of applying.   Find your nearest local Department of  by visiting [email or web address]     Please contact your local job and family services or check online to get updates on the status of your application. Your local [facility] will not contact you with updates.                  [place] Health Insurance Information Program (OSHIIP)   What they offer: The department's [place] Health Insurance Information Program (OSHIIP) provides Medicare beneficiaries with free, objective health insurance information, one-on-one counseling, and educates consumers about Medicare, Medicare prescription drug coverage (Part D), Medicare Advantage options, Medicare supplement insurance.  Website:

## 2024-04-19 NOTE — PROGRESS NOTES
Subjective:      Patient ID: Юлия Barahona is a 63 y.o. female.    4/19/2024  Patient presents with:  Arm Pain: Pain under armpit                 12/22/2023 9/29/2023 Patient presents with:  Numbness: In toes and feet with cramping  Arm Pain: Left arm pain when raising arm               6/13/2023  Patient presents with:  Follow-up: 1 week                 6/6/2023 Patient presents with:  Hypertension  does not like labetalol !causes freq of urination !   Takes it only once daily   Cough  ch annoying . States has had complete ENT evaluation reportedly nl   Spasms: got muscle relaxant from Urgent care               Last seen  VV   6/1/2023  Patient presents with:  Hypertension:               VV 4/4/2023 Patient presents with:  Medication Check: Pt she would like to change her Amlodipine to something else, pt states she is constantly going to the bathroom    Frequency of urination worse with Norvasc !               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     Her prior echocardiogram from 3/2021 shows  demonstrates hyperdynamic systolic function with an EF of 65%, mild concentric LVH and no regional wall abnormalities or diastolic dysfunction.          Last seen  VV 3/26/2021 Patient presents with:  Results: discuss results from 12/2020  Other: cramps in legs off and on merissa after lying on leather couch ??               4/30/2020 Patient presents with:  Lower Back Pain: just started a few hrs ago now feels spasms and extreme pain   Knee Pain: better with Celebrex . Also got a shot from Ortho a few

## 2024-05-16 NOTE — TELEPHONE ENCOUNTER
Medication:   Requested Prescriptions     Pending Prescriptions Disp Refills    diclofenac sodium (VOLTAREN) 1 % GEL [Pharmacy Med Name: DICLOFENAC SODIUM 1% GEL] 100 g 3     Sig: APPLY 4 GRAMS TOPICALLY 4 TIMES A DAY        Last Filled:      Patient Phone Number: [phone] (home) [phone] (work)    Last appt: 4/19/2024   Next appt: Visit date not found    Last OARRS:        No data to display

## 2024-06-15 DIAGNOSIS — M94.0 COSTOCHONDRITIS: ICD-10-CM

## 2024-06-17 RX ORDER — CELECOXIB 200 MG/1
CAPSULE ORAL DAILY
Qty: 30 CAPSULE | Refills: 1 | Status: SHIPPED | OUTPATIENT
Start: 2024-06-17

## 2024-06-17 NOTE — TELEPHONE ENCOUNTER
Medication:   Requested Prescriptions     Pending Prescriptions Disp Refills    celecoxib (CELEBREX) 200 MG capsule [Pharmacy Med Name: CELECOXIB 200 MG CAPSULE] 30 capsule 1     Sig: TAKE 1 CAPSULE BY MOUTH EVERY DAY        Last Filled:      Patient Phone Number: [phone] (home) [phone] (work)    Last appt: 4/19/2024   Next appt: Visit date not found    Last OARRS:        No data to display

## 2024-07-03 ENCOUNTER — OFFICE VISIT (OUTPATIENT)
Dept: PRIMARY CARE CLINIC | Age: 64
End: 2024-07-03
Payer: COMMERCIAL

## 2024-07-03 VITALS
HEIGHT: 67 IN | TEMPERATURE: 97.3 F | HEART RATE: 57 BPM | OXYGEN SATURATION: 98 % | DIASTOLIC BLOOD PRESSURE: 90 MMHG | SYSTOLIC BLOOD PRESSURE: 150 MMHG | BODY MASS INDEX: 39.83 KG/M2 | WEIGHT: 253.8 LBS

## 2024-07-03 DIAGNOSIS — I10 ESSENTIAL HYPERTENSION: ICD-10-CM

## 2024-07-03 DIAGNOSIS — Z12.31 ENCOUNTER FOR SCREENING MAMMOGRAM FOR HIGH-RISK PATIENT: ICD-10-CM

## 2024-07-03 DIAGNOSIS — J98.01 COUGH DUE TO BRONCHOSPASM: Primary | ICD-10-CM

## 2024-07-03 PROBLEM — E11.9 TYPE 2 DIABETES MELLITUS (HCC): Status: ACTIVE | Noted: 2024-07-03

## 2024-07-03 PROBLEM — E11.9 TYPE 2 DIABETES MELLITUS (HCC): Status: RESOLVED | Noted: 2024-04-19 | Resolved: 2024-07-03

## 2024-07-03 PROBLEM — E11.9 TYPE 2 DIABETES MELLITUS (HCC): Status: RESOLVED | Noted: 2024-07-03 | Resolved: 2024-07-03

## 2024-07-03 PROCEDURE — 3078F DIAST BP <80 MM HG: CPT | Performed by: INTERNAL MEDICINE

## 2024-07-03 PROCEDURE — 3077F SYST BP >= 140 MM HG: CPT | Performed by: INTERNAL MEDICINE

## 2024-07-03 PROCEDURE — 99214 OFFICE O/P EST MOD 30 MIN: CPT | Performed by: INTERNAL MEDICINE

## 2024-07-03 RX ORDER — ALBUTEROL SULFATE 90 UG/1
2 AEROSOL, METERED RESPIRATORY (INHALATION) EVERY 4 HOURS PRN
Qty: 18 G | Refills: 3 | Status: SHIPPED | OUTPATIENT
Start: 2024-07-03

## 2024-07-03 ASSESSMENT — ENCOUNTER SYMPTOMS
ABDOMINAL DISTENTION: 0
ABDOMINAL PAIN: 0
DIARRHEA: 0
CHEST TIGHTNESS: 0
SHORTNESS OF BREATH: 0
GASTROINTESTINAL NEGATIVE: 1
EYES NEGATIVE: 1
BLOOD IN STOOL: 0
CONSTIPATION: 0
WHEEZING: 0
COUGH: 0

## 2024-07-03 NOTE — PROGRESS NOTES
mouth daily    Gastroesophageal reflux disease without esophagitis  take as needed   -     pantoprazole (PROTONIX) 40 MG tablet; Take 1 tablet by mouth every morning (before breakfast)      Severe obesity (BMI 35.0-39.9) with comorbidity (HCC)  GLP-1 Agonists not covered!  Advised low carb diet with Intermittent Fasting             Prediabetes  -                  Plan:           NII PATTEN MD

## 2024-07-22 RX ORDER — AZELAIC ACID 0.15 G/G
GEL TOPICAL
Qty: 50 G | Refills: 2 | Status: SHIPPED | OUTPATIENT
Start: 2024-07-22

## 2024-07-22 NOTE — TELEPHONE ENCOUNTER
Medication:   Requested Prescriptions     Pending Prescriptions Disp Refills    Azelaic Acid 15 % GEL [Pharmacy Med Name: AZELAIC ACID 15% GEL] 50 g 2     Sig: APPLY TOPICALLY ONCE DAILY FOR ACNE        Last Filled:      Patient Phone Number: [phone] (home) [phone] (work)    Last appt: 7/3/2024   Next appt: 7/31/2024    Last OARRS:        No data to display

## 2024-07-31 ENCOUNTER — APPOINTMENT (OUTPATIENT)
Dept: GENERAL RADIOLOGY | Age: 64
End: 2024-07-31
Payer: COMMERCIAL

## 2024-07-31 ENCOUNTER — HOSPITAL ENCOUNTER (EMERGENCY)
Age: 64
Discharge: HOME | End: 2024-07-31
Attending: EMERGENCY MEDICINE
Payer: COMMERCIAL

## 2024-07-31 VITALS
SYSTOLIC BLOOD PRESSURE: 151 MMHG | TEMPERATURE: 98.7 F | DIASTOLIC BLOOD PRESSURE: 83 MMHG | HEART RATE: 83 BPM | RESPIRATION RATE: 18 BRPM | HEIGHT: 65 IN | OXYGEN SATURATION: 97 % | BODY MASS INDEX: 42.15 KG/M2 | WEIGHT: 253 LBS

## 2024-07-31 DIAGNOSIS — M79.672 PAIN OF LEFT HEEL: Primary | ICD-10-CM

## 2024-07-31 PROCEDURE — 6370000000 HC RX 637 (ALT 250 FOR IP)

## 2024-07-31 PROCEDURE — 73630 X-RAY EXAM OF FOOT: CPT

## 2024-07-31 PROCEDURE — 99283 EMERGENCY DEPT VISIT LOW MDM: CPT

## 2024-07-31 RX ORDER — ACETAMINOPHEN 500 MG
1000 TABLET ORAL
Status: COMPLETED | OUTPATIENT
Start: 2024-07-31 | End: 2024-07-31

## 2024-07-31 RX ORDER — IBUPROFEN 400 MG/1
800 TABLET ORAL ONCE
Status: COMPLETED | OUTPATIENT
Start: 2024-07-31 | End: 2024-07-31

## 2024-07-31 RX ADMIN — ACETAMINOPHEN 1000 MG: 500 TABLET ORAL at 20:24

## 2024-07-31 RX ADMIN — IBUPROFEN 800 MG: 400 TABLET, FILM COATED ORAL at 20:25

## 2024-07-31 ASSESSMENT — PAIN SCALES - GENERAL
PAINLEVEL_OUTOF10: 9
PAINLEVEL_OUTOF10: 9

## 2024-07-31 ASSESSMENT — PAIN DESCRIPTION - LOCATION
LOCATION: OTHER (COMMENT)
LOCATION: FOOT

## 2024-07-31 ASSESSMENT — LIFESTYLE VARIABLES
HOW MANY STANDARD DRINKS CONTAINING ALCOHOL DO YOU HAVE ON A TYPICAL DAY: 1 OR 2
HOW OFTEN DO YOU HAVE A DRINK CONTAINING ALCOHOL: MONTHLY OR LESS

## 2024-07-31 ASSESSMENT — ENCOUNTER SYMPTOMS
EYE ITCHING: 0
ABDOMINAL PAIN: 0
CONSTIPATION: 0
SORE THROAT: 0
EYE DISCHARGE: 0
BACK PAIN: 0
DIARRHEA: 0
SHORTNESS OF BREATH: 0
EYE PAIN: 0
NAUSEA: 0
WHEEZING: 0
RHINORRHEA: 0
COUGH: 0

## 2024-07-31 ASSESSMENT — PAIN DESCRIPTION - ORIENTATION
ORIENTATION: LOWER;LEFT
ORIENTATION: LEFT

## 2024-07-31 ASSESSMENT — PAIN - FUNCTIONAL ASSESSMENT: PAIN_FUNCTIONAL_ASSESSMENT: 0-10

## 2024-07-31 ASSESSMENT — PAIN DESCRIPTION - DESCRIPTORS: DESCRIPTORS: ITCHING

## 2024-08-01 NOTE — ED PROVIDER NOTES
%  Physical Exam  Constitutional:       General: She is not in acute distress.     Appearance: Normal appearance. She is normal weight. She is not ill-appearing, toxic-appearing or diaphoretic.   HENT:      Head: Normocephalic and atraumatic.      Right Ear: External ear normal.      Left Ear: External ear normal.      Nose: Nose normal.      Mouth/Throat:      Mouth: Mucous membranes are moist.   Eyes:      Extraocular Movements: Extraocular movements intact.      Pupils: Pupils are equal, round, and reactive to light.   Cardiovascular:      Rate and Rhythm: Normal rate and regular rhythm.      Pulses: Normal pulses.      Heart sounds: Normal heart sounds. No murmur heard.     No gallop.   Pulmonary:      Effort: Pulmonary effort is normal. No respiratory distress.      Breath sounds: Normal breath sounds. No wheezing, rhonchi or rales.   Abdominal:      General: Abdomen is flat. Bowel sounds are normal. There is no distension.      Palpations: Abdomen is soft.      Tenderness: There is no abdominal tenderness. There is no guarding or rebound.   Musculoskeletal:      Cervical back: Normal range of motion and neck supple.      Right lower leg: No edema.      Left lower leg: No edema.      Comments: Left foot: No gross deformities.  No edema, erythema, lacerations or abrasions.  The foot is grossly nontender with the exception of a focal area of tenderness on the plantar foot, approximately 3 to 4 cm from the most posterior portion of the foot, overlying the calcaneus.  2+ DP pulse.    Skin:     General: Skin is warm and dry.      Capillary Refill: Capillary refill takes less than 2 seconds.   Neurological:      Mental Status: She is alert and oriented to person, place, and time.   Psychiatric:         Mood and Affect: Mood normal.         Behavior: Behavior normal.         Diagnostic Results       RADIOLOGY:  XR FOOT LEFT (MIN 3 VIEWS)   Final Result   No acute osseous injury.         Electronically signed by Jose

## 2024-08-01 NOTE — DISCHARGE INSTRUCTIONS
Iris, you were seen in the emergency department for left heel pain after a fall.  Your x-rays are negative.  However given that you are having continued worsening pain and x-rays of the feet are not the best, I do think that you would actually benefit from seeing a foot specialist.  In the meantime you were given a walking boot and I want you to have this on at all times when you are out of bed.  I want you to continue using your diclofenac gel.  You can try ibuprofen and Tylenol orally as well for the pain.  I do recommend that you keep the extremity elevated above the level of your heart.  Please make an appointment with Dr. Castanon, I recommend that you call for first thing in the morning to make this appointment.

## 2024-08-07 ENCOUNTER — OFFICE VISIT (OUTPATIENT)
Dept: ORTHOPEDIC SURGERY | Age: 64
End: 2024-08-07

## 2024-08-07 VITALS — BODY MASS INDEX: 42.15 KG/M2 | WEIGHT: 253 LBS | HEIGHT: 65 IN

## 2024-08-07 DIAGNOSIS — S92.002A CLOSED NONDISPLACED FRACTURE OF LEFT CALCANEUS, UNSPECIFIED PORTION OF CALCANEUS, INITIAL ENCOUNTER: Primary | ICD-10-CM

## 2024-08-07 DIAGNOSIS — M79.672 PAIN OF LEFT HEEL: ICD-10-CM

## 2024-08-07 NOTE — PROGRESS NOTES
[facility]  Office Visit    Chief Complaint    Foot Pain (Left foot)      History of Present Illness:  Юлия Barahona is a 64 y.o. female who presents for left heel pain after a fall 1 week ago.  She tripped down some steps and impacted her left heel pad directly.  She went to the emergency room, she was placed in a boot for comfort.  Since then she has pain with walking.  Some pain with rest.  No numbness tingling.  No prior ankle injuries.  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          Past Medical History:   Diagnosis Date    GERD (gastroesophageal reflux disease)     Gestational diabetes     Hypertension     Morbid obesity due to excess calories ([facility]) 2016    Severe persistent asthma 2019    Vitamin D deficiency     Xerosis cutis         Past Surgical History:   Procedure Laterality Date     SECTION         No family history on file.    Social History     Socioeconomic History    Marital status:      Spouse name: None    Number of children: None    Years of education: None    Highest education level: None   Tobacco Use    Smoking status: Never    Smokeless tobacco: Never   Vaping Use    Vaping Use: Never used   Substance and Sexual Activity    Alcohol use: Yes     Alcohol/week: 0.0 standard drinks of alcohol     Comment: rare    Drug use: No    Sexual activity: Yes     Partners: Male     Social Determinants of Health     Financial Resource Strain: Low Risk  (2023)    Overall Financial Resource Strain (CARDIA)     Difficulty of Paying Living Expenses: Not hard at all   Food Insecurity: No Food Insecurity (2024)    Hunger Vital Sign     Worried About Running Out of Food in the Last Year: Never true     Ran Out of Food in the Last Year: Never true   Transportation Needs: Unknown (2024)    PRAPARE - Transportation     Lack of Transportation (Non-Medical): No   Housing Stability: Unknown

## 2024-08-08 ENCOUNTER — TELEPHONE (OUTPATIENT)
Dept: ORTHOPEDIC SURGERY | Age: 64
End: 2024-08-08

## 2024-08-08 NOTE — TELEPHONE ENCOUNTER
S/w Юлия Barahona  regarding MRI Left Foot approval and authorization being valid until 09/05/2024.  Patient was instructed that their MRI needs to be scheduled at [facility] . The patient was instructed to contact the facility to schedule  at [phone].    Per our records, it appears they have received a call from [facility] regarding the authorization and order our office has faxed over. The MRI appears to be scheduled for 08/13/2024.     A follow up appointment will need to be scheduled to review the results and treatment plan.     The patient has elected to contact the office at a later time to schedule a follow up appointment.

## 2024-08-13 ENCOUNTER — HOSPITAL ENCOUNTER (OUTPATIENT)
Dept: MRI IMAGING | Age: 64
Discharge: HOME | End: 2024-08-13
Attending: ORTHOPAEDIC SURGERY
Payer: COMMERCIAL

## 2024-08-13 DIAGNOSIS — M79.672 PAIN OF LEFT HEEL: ICD-10-CM

## 2024-08-13 DIAGNOSIS — S92.002A CLOSED NONDISPLACED FRACTURE OF LEFT CALCANEUS, UNSPECIFIED PORTION OF CALCANEUS, INITIAL ENCOUNTER: ICD-10-CM

## 2024-08-13 PROCEDURE — 73718 MRI LOWER EXTREMITY W/O DYE: CPT

## 2024-08-25 DIAGNOSIS — M94.0 COSTOCHONDRITIS: ICD-10-CM

## 2024-08-26 RX ORDER — CELECOXIB 200 MG/1
CAPSULE ORAL DAILY
Qty: 30 CAPSULE | Refills: 1 | Status: SHIPPED | OUTPATIENT
Start: 2024-08-26

## 2024-08-26 NOTE — TELEPHONE ENCOUNTER
Medication:   Requested Prescriptions     Pending Prescriptions Disp Refills    celecoxib (CELEBREX) 200 MG capsule [Pharmacy Med Name: CELECOXIB 200 MG CAPSULE] 30 capsule 1     Sig: TAKE 1 CAPSULE BY MOUTH EVERY DAY        Last Filled:      Patient Phone Number: [phone] (home)     Last appt: 7/3/2024   Next appt: 12/24/2024    Last OARRS:        No data to display                   4 = No assist / stand by assistance

## 2024-08-29 ENCOUNTER — OFFICE VISIT (OUTPATIENT)
Dept: ORTHOPEDIC SURGERY | Age: 64
End: 2024-08-29
Payer: COMMERCIAL

## 2024-08-29 VITALS — WEIGHT: 253 LBS | BODY MASS INDEX: 42.15 KG/M2 | HEIGHT: 65 IN | RESPIRATION RATE: 12 BRPM

## 2024-08-29 DIAGNOSIS — M72.2 PLANTAR FASCIITIS OF LEFT FOOT: Primary | ICD-10-CM

## 2024-08-29 PROCEDURE — 99213 OFFICE O/P EST LOW 20 MIN: CPT | Performed by: ORTHOPAEDIC SURGERY

## 2024-08-29 NOTE — PROGRESS NOTES
[facility]  Office Visit    Chief Complaint    Foot Pain (TR MRI Left foot )      History of Present Illness:  Юлия Barahona is a 64 y.o. female who presents to review her MRI regarding left heel pain after a fall on .  She tripped down some steps and impacted her left heel pad directly.  Since then, she has been in a CAM boot and her symptoms have been improving.  She does report that her pain is worse in the morning and gets better as she ambulates throughout the day.    The patient denies any setbacks.    Pain Assessment  Location of Pain: Foot  Location Modifiers: Left  Severity of Pain: 6  Quality of Pain: Aching  Duration of Pain: Persistent  Frequency of Pain: Intermittent  Date Pain First Started: 24  Aggravating Factors: Walking  Limiting Behavior: Some  Relieving Factors: Nsaids, Rest  Result of Injury: Yes  Work-Related Injury: No  Are there other pain locations you wish to document?: No    Past Medical History:   Diagnosis Date    GERD (gastroesophageal reflux disease)     Gestational diabetes     Hypertension     Morbid obesity due to excess calories ([facility]) 2016    Severe persistent asthma 2019    Vitamin D deficiency     Xerosis cutis         Past Surgical History:   Procedure Laterality Date     SECTION  2004       History reviewed. No pertinent family history.    Social History     Socioeconomic History    Marital status:      Spouse name: None    Number of children: None    Years of education: None    Highest education level: None   Tobacco Use    Smoking status: Never    Smokeless tobacco: Never   Vaping Use    Vaping status: Never Used   Substance and Sexual Activity    Alcohol use: Yes     Alcohol/week: 0.0 standard drinks of alcohol     Comment: rare    Drug use: No    Sexual activity: Yes     Partners: Male     Social Determinants of Health     Financial Resource Strain: Low Risk  (2023)    Overall Financial

## 2024-09-12 ENCOUNTER — OFFICE VISIT (OUTPATIENT)
Dept: ORTHOPEDIC SURGERY | Age: 64
End: 2024-09-12
Payer: COMMERCIAL

## 2024-09-12 VITALS — BODY MASS INDEX: 42.15 KG/M2 | WEIGHT: 253 LBS | HEIGHT: 65 IN

## 2024-09-12 DIAGNOSIS — M72.2 PLANTAR FASCIITIS OF LEFT FOOT: Primary | ICD-10-CM

## 2024-09-12 PROCEDURE — 99213 OFFICE O/P EST LOW 20 MIN: CPT | Performed by: ORTHOPAEDIC SURGERY

## 2024-09-20 ENCOUNTER — HOSPITAL ENCOUNTER (OUTPATIENT)
Dept: PHYSICAL THERAPY | Age: 64
Setting detail: THERAPIES SERIES
Discharge: HOME | End: 2024-09-20
Payer: COMMERCIAL

## 2024-09-20 DIAGNOSIS — M79.672 LEFT FOOT PAIN: Primary | ICD-10-CM

## 2024-09-20 PROCEDURE — 97110 THERAPEUTIC EXERCISES: CPT

## 2024-09-20 PROCEDURE — 97161 PT EVAL LOW COMPLEX 20 MIN: CPT

## 2024-09-27 ENCOUNTER — APPOINTMENT (OUTPATIENT)
Dept: PHYSICAL THERAPY | Age: 64
End: 2024-09-27
Payer: COMMERCIAL

## 2024-10-04 ENCOUNTER — HOSPITAL ENCOUNTER (OUTPATIENT)
Dept: PHYSICAL THERAPY | Age: 64
Setting detail: THERAPIES SERIES
Discharge: HOME | End: 2024-10-04
Payer: COMMERCIAL

## 2024-10-04 PROCEDURE — 97140 MANUAL THERAPY 1/> REGIONS: CPT

## 2024-10-04 PROCEDURE — 97110 THERAPEUTIC EXERCISES: CPT

## 2024-10-04 NOTE — FLOWSHEET NOTE
[facility] [place]- Outpatient Rehabilitation and Therapy [address], [address], [place] [address] office: [phone] fax: [phone]     Physical Therapy: TREATMENT/PROGRESS NOTE   Patient: Юлия Barahona (64 y.o. female)   Examination Date: 10/04/2024   :  1960 MRN: [id number]   Visit #: 2   Insurance Allowable Auth Needed   BMN []Yes    [x]No    Insurance: Payor: BCBS / Plan: BCBS - [place] PPO / Product Type: *No Product type* /   Insurance ID: [id number] - ([place] [employer])  Secondary Insurance (if applicable):    Treatment Diagnosis:     ICD-10-CM    1. Left foot pain  M79.672          Medical Diagnosis:  Pain of left heel [M79.672]   Referring Physician: Matt Castanon MD  PCP: Villa Dela Cruz MD     Plan of care signed (Y/N):     Date of Patient follow up with Physician:      Plan of Care Report: NO  POC update due: (10 visits /OR AUTH LIMITS, whichever is less) 10/20/2024                                             Medical History:  Comorbidities:  Hypertension  Other: asthma  Relevant Medical History: none noted by pt.                                          Precautions/ Contra-indications:           Latex allergy:  NO  Pacemaker:    NO  Contraindications for Manipulation: None  Date of Surgery: N/A  Other:    Red Flags:  None    Suicide Screening:   The patient did not verbalize a primary behavioral concern, suicidal ideation, suicidal intent, or demonstrate suicidal behaviors.    Preferred Language for Healthcare:   [x] English       [] other:    SUBJECTIVE EXAMINATION     Patient stated complaint:       EVAL: Pt presents to physical therapy with complaints of L foot pain. Her pain began after a fall on . She had an MRI close to this fall, which revealed a calcaneal contusion. She ambulated in a boot, which seemed to help her pain, but now she has pain when ambulating outside of the boot.        Test used Initial score  9/18/24 10/04/2024   Pain Summary VAS 3-8 1/10

## 2024-10-27 DIAGNOSIS — K21.9 GASTROESOPHAGEAL REFLUX DISEASE WITHOUT ESOPHAGITIS: ICD-10-CM

## 2024-10-28 RX ORDER — PANTOPRAZOLE SODIUM 40 MG/1
40 TABLET, DELAYED RELEASE ORAL
Qty: 90 TABLET | Refills: 1 | Status: SHIPPED | OUTPATIENT
Start: 2024-10-28

## 2024-10-28 NOTE — TELEPHONE ENCOUNTER
Medication:   Requested Prescriptions     Pending Prescriptions Disp Refills    pantoprazole (PROTONIX) 40 MG tablet [Pharmacy Med Name: PANTOPRAZOLE SOD DR 40 MG TAB] 90 tablet 1     Sig: TAKE 1 TABLET BY MOUTH EVERY DAY BEFORE BREAKFAST        Last Filled:      Patient Phone Number: [phone] (home)     Last appt: 7/3/2024   Next appt: 12/24/2024    Last OARRS:        No data to display

## 2024-11-06 ENCOUNTER — HOSPITAL ENCOUNTER (EMERGENCY)
Age: 64
Discharge: HOME | End: 2024-11-06
Attending: EMERGENCY MEDICINE
Payer: COMMERCIAL

## 2024-11-06 ENCOUNTER — APPOINTMENT (OUTPATIENT)
Dept: GENERAL RADIOLOGY | Age: 64
End: 2024-11-06
Payer: COMMERCIAL

## 2024-11-06 VITALS
RESPIRATION RATE: 20 BRPM | SYSTOLIC BLOOD PRESSURE: 159 MMHG | DIASTOLIC BLOOD PRESSURE: 68 MMHG | BODY MASS INDEX: 41.43 KG/M2 | WEIGHT: 248.68 LBS | HEART RATE: 67 BPM | TEMPERATURE: 99.1 F | HEIGHT: 65 IN | OXYGEN SATURATION: 98 %

## 2024-11-06 DIAGNOSIS — J45.41 MODERATE PERSISTENT ASTHMA WITH ACUTE EXACERBATION: Primary | ICD-10-CM

## 2024-11-06 PROCEDURE — 6370000000 HC RX 637 (ALT 250 FOR IP)

## 2024-11-06 PROCEDURE — 94640 AIRWAY INHALATION TREATMENT: CPT

## 2024-11-06 PROCEDURE — 6360000002 HC RX W HCPCS

## 2024-11-06 PROCEDURE — 71045 X-RAY EXAM CHEST 1 VIEW: CPT

## 2024-11-06 PROCEDURE — 99283 EMERGENCY DEPT VISIT LOW MDM: CPT

## 2024-11-06 RX ORDER — PREDNISONE 20 MG/1
60 TABLET ORAL ONCE
Status: COMPLETED | OUTPATIENT
Start: 2024-11-06 | End: 2024-11-06

## 2024-11-06 RX ORDER — ALBUTEROL SULFATE 0.83 MG/ML
2.5 SOLUTION RESPIRATORY (INHALATION) ONCE
Status: COMPLETED | OUTPATIENT
Start: 2024-11-06 | End: 2024-11-06

## 2024-11-06 RX ORDER — PREDNISONE 20 MG/1
20 TABLET ORAL 2 TIMES DAILY
Qty: 10 TABLET | Refills: 0 | Status: SHIPPED | OUTPATIENT
Start: 2024-11-06 | End: 2024-11-11

## 2024-11-06 RX ORDER — IPRATROPIUM BROMIDE AND ALBUTEROL SULFATE 2.5; .5 MG/3ML; MG/3ML
1 SOLUTION RESPIRATORY (INHALATION)
Status: DISCONTINUED | OUTPATIENT
Start: 2024-11-07 | End: 2024-11-07 | Stop reason: HOSPADM

## 2024-11-06 RX ADMIN — ALBUTEROL SULFATE 2.5 MG: 2.5 SOLUTION RESPIRATORY (INHALATION) at 23:04

## 2024-11-06 RX ADMIN — ALBUTEROL SULFATE 2.5 MG: 2.5 SOLUTION RESPIRATORY (INHALATION) at 20:33

## 2024-11-06 RX ADMIN — ALBUTEROL SULFATE 2.5 MG: 2.5 SOLUTION RESPIRATORY (INHALATION) at 21:42

## 2024-11-06 RX ADMIN — ALBUTEROL SULFATE 2.5 MG: 2.5 SOLUTION RESPIRATORY (INHALATION) at 21:00

## 2024-11-06 RX ADMIN — PREDNISONE 60 MG: 20 TABLET ORAL at 20:31

## 2024-11-06 ASSESSMENT — ENCOUNTER SYMPTOMS
COUGH: 1
WHEEZING: 1
SHORTNESS OF BREATH: 1
GASTROINTESTINAL NEGATIVE: 1
EYES NEGATIVE: 1
ALLERGIC/IMMUNOLOGIC NEGATIVE: 1

## 2024-11-06 ASSESSMENT — PAIN - FUNCTIONAL ASSESSMENT: PAIN_FUNCTIONAL_ASSESSMENT: 0-10

## 2024-11-06 ASSESSMENT — PAIN DESCRIPTION - DESCRIPTORS: DESCRIPTORS: ACHING

## 2024-11-06 ASSESSMENT — PAIN SCALES - GENERAL: PAINLEVEL_OUTOF10: 4

## 2024-11-07 ENCOUNTER — CARE COORDINATION (OUTPATIENT)
Dept: PRIMARY CARE CLINIC | Age: 64
End: 2024-11-07

## 2024-11-07 NOTE — CARE COORDINATION
Ambulatory Care Coordination Note     11/7/2024 1:49 PM     ACM outreach attempt by this ACM today to offer care management services. ACM was unable to reach the patient by telephone today; left voice message requesting a return phone call to this ACM.     ACM: Ciera Burrell RN     Care Summary Note:  initial acm out reach attempted, HIPAA compliant message left  on vm    PCP/Specialist follow up:   Future Appointments         Provider Specialty Dept Phone    12/24/2024 11:40 AM Villa Dela Cruz MD Primary Care [phone]            Follow Up:   Plan for next ACM outreach in approximately 1-2 days  to complete:  - CC Protocol assessments.

## 2024-11-07 NOTE — ED PROVIDER NOTES
ED Attending Attestation Note     Date of evaluation: 2023    This patient was seen by the advance practice provider.  I have seen and examined the patient, agree with the workup, evaluation, management and diagnosis. The care plan has been discussed.      Patient History     Chief Complaint: Asthma (Pt presents to the ED with complaints of asthma related coughing. Pt states she began wheezing on Monday but cough off and on all the time. Pt states she took albuterol inhaler and delsym at home. Pt reports she has had some increased shortness of breath and wheezing tonight. )      HPI: Юлия Barahona is a 64 y.o. who presents to the Emergency Department with complaints of increasing cough, shortness of breath, and wheezing.  The patient has a reported history of asthma, but only has an albuterol rescue Hailer at home, and typically only experiences flares around this time of year.  She states that her flares are typically mostly related to increasing cough, and that she has never had significant shortness of breath and wheezing before.  Her home albuterol inhaler is no longer sufficiently controlling her symptoms.  Denies productive cough, denies fevers or chills.  Denies anterior chest pain, but does describe bilateral lower rib cage pain, particularly when she coughs.      She has a past medical history of GERD (gastroesophageal reflux disease), Gestational diabetes, Hypertension, Morbid obesity due to excess calories, Severe persistent asthma, Vitamin D deficiency, and Xerosis cutis.    She has a past surgical history that includes  section ().    family history is not on file.    She reports that she has never smoked. She has never used smokeless tobacco. She reports that she does not currently use alcohol. She reports that she does not use drugs.    Pertinent Physical Exam Findings:   On my examination, after the patient's initial bronchodilator nebulizer treatment, she has reasonable air entry, 
(CALTRATE) 600-800 MG-UNIT TABS tablet TAKE 1 TABLET BY MOUTH 2 TIMES DAILY. IF STILL OOS IN 1 WEEK, REQUEST A DIFFERENT STRENGTH FROM DOC, Disp-60 tablet, R-11Normal             Allergies     She is allergic to chloroquine, lisinopril, mefloquine, and quinolones.    Physical Exam     INITIAL VITALS: BP: (!) 150/86, Temp: 99.1 °F (37.3 °C), Pulse: 73, Respirations: 24, SpO2: 98 %   Physical Exam  Vitals and nursing note reviewed.   Constitutional:       General: She is in acute distress.      Appearance: Normal appearance. She is not ill-appearing or toxic-appearing.   HENT:      Head: Normocephalic and atraumatic.      Right Ear: External ear normal.      Left Ear: External ear normal.      Mouth/Throat:      Mouth: Mucous membranes are moist.      Pharynx: Oropharynx is clear. No oropharyngeal exudate or posterior oropharyngeal erythema.   Eyes:      Extraocular Movements: Extraocular movements intact.      Pupils: Pupils are equal, round, and reactive to light.   Cardiovascular:      Rate and Rhythm: Normal rate and regular rhythm.   Pulmonary:      Effort: Respiratory distress present.      Breath sounds: Wheezing present.      Comments: Wheezes noted throughout lung fields  Musculoskeletal:         General: Normal range of motion.      Cervical back: Normal range of motion and neck supple.   Skin:     General: Skin is warm and dry.      Capillary Refill: Capillary refill takes less than 2 seconds.   Neurological:      General: No focal deficit present.      Mental Status: She is alert and oriented to person, place, and time.   Psychiatric:         Mood and Affect: Mood normal.         Behavior: Behavior normal.              Adonay Viveros APRN - NP  11/07/24 0007

## 2024-11-07 NOTE — ED NOTES
RT at bedside   Patient having intermittent periods of hypoxia in the high 80's Spo2  Provider made aware      Lionel Germain, RN  11/06/24 7896

## 2024-11-07 NOTE — PROGRESS NOTES
11/06/24 2030   Treatment   Treatment Type HHN   $Treatment Type $Inhaled Therapy/Meds   Medications Albuterol   Pre-Tx Pulse 83   Pre-Tx Resps 22   Breath Sounds Pre-Tx RHONDA Wheezing   Breath Sounds Pre-Tx LLL Wheezing   Breath Sounds Pre-Tx RUL Wheezing   Breath Sounds Pre-Tx RML Wheezes   Breath Sounds Pre-Tx RLL Wheezes   Breath Sounds Post-Tx RHONDA End expiratory wheezes   Breath Sounds Post-Tx LLL End expiratory wheezes   Breath Sounds Post-Tx RUL End expiratory wheezes   Breath Sounds Post-Tx RML End expiratory wheezes   Breath Sounds Post-Tx RLL End expiratory wheezes   Post-Tx Pulse 88   Post-Tx Resps 22   Delivery Source Oxygen;Mouthpiece   Position Semi-Stanley's   Treatment Tolerance Well   Duration 7   Is patient on O2? N   Breath Sounds   Respiratory Pattern Regular   Breath Sounds Bilateral Expiratory wheezing   Oxygen Therapy/Pulse Ox   O2 Therapy Room air   O2 Device None (Room air)   Pulse 88   Respirations 22   SpO2 96 %   Cough/Sputum   Cough Non-productive;Strong;Dry   Frequency Frequent   Sputum Amount CHARLES   Sputum Color CHARLES   Tenacity CHARLES   Patient Observation   Patient Observations frequent coughing during tx     Pt states she has a relatively recent diagnosis of asthma that has manifested as a chronic and persistent cough and wheezing especially during HS. Admits that she is on no maintenance medications with the exception of Albuterol PRN at home. Pt wheezing improved with bronchodilator tx. Currently on RA and maintaining saturation.

## 2024-11-07 NOTE — ED NOTES
Patient prepared for and ready to be discharged. Patient discharged at this time in no acute distress after verbalizing understanding of discharge instructions. Patient left after receiving After Visit Summary instructions.        Lionel Germain RN  11/06/24 2646

## 2024-11-08 ENCOUNTER — CARE COORDINATION (OUTPATIENT)
Dept: PRIMARY CARE CLINIC | Age: 64
End: 2024-11-08

## 2024-11-08 NOTE — CARE COORDINATION
Ambulatory Care Coordination Note     11/8/2024 12:00 PM     patient outreach attempt by this AC today to offer care management services. AC was unable to reach the patient by telephone today; left voice message requesting a return phone call to this ACM.     Patient closed (unable to reach patient) from the High Risk Care Management program on 11/8/2024.  Patient has the ability to self manage at this time..  Care management goals have been completed. No further Ambulatory Care Manager follow up scheduled.

## 2024-11-30 ENCOUNTER — HOSPITAL ENCOUNTER (EMERGENCY)
Age: 64
Discharge: HOME | End: 2024-11-30
Attending: EMERGENCY MEDICINE
Payer: COMMERCIAL

## 2024-11-30 ENCOUNTER — APPOINTMENT (OUTPATIENT)
Dept: GENERAL RADIOLOGY | Age: 64
End: 2024-11-30
Payer: COMMERCIAL

## 2024-11-30 VITALS
BODY MASS INDEX: 43.05 KG/M2 | SYSTOLIC BLOOD PRESSURE: 178 MMHG | HEIGHT: 65 IN | RESPIRATION RATE: 15 BRPM | OXYGEN SATURATION: 99 % | WEIGHT: 258.4 LBS | HEART RATE: 83 BPM | DIASTOLIC BLOOD PRESSURE: 77 MMHG

## 2024-11-30 DIAGNOSIS — J45.40 MODERATE PERSISTENT ASTHMA, UNSPECIFIED WHETHER COMPLICATED: Primary | ICD-10-CM

## 2024-11-30 LAB
ANION GAP SERPL CALCULATED.3IONS-SCNC: 11 MMOL/L (ref 3–16)
BASE EXCESS BLDV CALC-SCNC: 2.6 MMOL/L (ref -2–3)
BASOPHILS # BLD: 0 K/UL (ref 0–0.2)
BASOPHILS NFR BLD: 0.5 %
BUN SERPL-MCNC: 11 MG/DL (ref 7–20)
CALCIUM SERPL-MCNC: 9 MG/DL (ref 8.3–10.6)
CHLORIDE SERPL-SCNC: 106 MMOL/L (ref 99–110)
CO2 BLDV-SCNC: 30 MMOL/L
CO2 SERPL-SCNC: 25 MMOL/L (ref 21–32)
COHGB MFR BLDV: 1.1 % (ref 0–1.5)
CREAT SERPL-MCNC: 0.9 MG/DL (ref 0.6–1.2)
DEPRECATED RDW RBC AUTO: 14.2 % (ref 12.4–15.4)
DO-HGB MFR BLDV: 16.4 %
EOSINOPHIL # BLD: 1.3 K/UL (ref 0–0.6)
EOSINOPHIL NFR BLD: 18.6 %
FLUAV RNA RESP QL NAA+PROBE: NOT DETECTED
FLUBV RNA RESP QL NAA+PROBE: NOT DETECTED
GFR SERPLBLD CREATININE-BSD FMLA CKD-EPI: 72 ML/MIN/{1.73_M2}
GLUCOSE SERPL-MCNC: 110 MG/DL (ref 70–99)
HCO3 BLDV-SCNC: 28.7 MMOL/L (ref 24–28)
HCT VFR BLD AUTO: 38.6 % (ref 36–48)
HGB BLD-MCNC: 12.7 G/DL (ref 12–16)
LYMPHOCYTES # BLD: 1.4 K/UL (ref 1–5.1)
LYMPHOCYTES NFR BLD: 20.5 %
MCH RBC QN AUTO: 28.2 PG (ref 26–34)
MCHC RBC AUTO-ENTMCNC: 32.8 G/DL (ref 31–36)
MCV RBC AUTO: 86 FL (ref 80–100)
METHGB MFR BLDV: 0.3 % (ref 0–1.5)
MONOCYTES # BLD: 0.6 K/UL (ref 0–1.3)
MONOCYTES NFR BLD: 8.9 %
NEUTROPHILS # BLD: 3.5 K/UL (ref 1.7–7.7)
NEUTROPHILS NFR BLD: 51.5 %
PCO2 BLDV: 49.1 MMHG (ref 41–51)
PH BLDV: 7.38 [PH] (ref 7.35–7.45)
PLATELET # BLD AUTO: 223 K/UL (ref 135–450)
PMV BLD AUTO: 8.7 FL (ref 5–10.5)
PO2 BLDV: 47.6 MMHG (ref 25–40)
POTASSIUM SERPL-SCNC: 4.2 MMOL/L (ref 3.5–5.1)
RBC # BLD AUTO: 4.49 M/UL (ref 4–5.2)
SAO2 % BLDV: 83 %
SARS-COV-2 RNA RESP QL NAA+PROBE: NOT DETECTED
SODIUM SERPL-SCNC: 142 MMOL/L (ref 136–145)
WBC # BLD AUTO: 6.8 K/UL (ref 4–11)

## 2024-11-30 PROCEDURE — 36415 COLL VENOUS BLD VENIPUNCTURE: CPT

## 2024-11-30 PROCEDURE — 85025 COMPLETE CBC W/AUTO DIFF WBC: CPT

## 2024-11-30 PROCEDURE — 93005 ELECTROCARDIOGRAM TRACING: CPT | Performed by: INTERNAL MEDICINE

## 2024-11-30 PROCEDURE — 80048 BASIC METABOLIC PNL TOTAL CA: CPT

## 2024-11-30 PROCEDURE — 6370000000 HC RX 637 (ALT 250 FOR IP): Performed by: INTERNAL MEDICINE

## 2024-11-30 PROCEDURE — 94761 N-INVAS EAR/PLS OXIMETRY MLT: CPT

## 2024-11-30 PROCEDURE — 87636 SARSCOV2 & INF A&B AMP PRB: CPT

## 2024-11-30 PROCEDURE — 71046 X-RAY EXAM CHEST 2 VIEWS: CPT

## 2024-11-30 PROCEDURE — 96374 THER/PROPH/DIAG INJ IV PUSH: CPT

## 2024-11-30 PROCEDURE — 6360000002 HC RX W HCPCS: Performed by: INTERNAL MEDICINE

## 2024-11-30 PROCEDURE — 99285 EMERGENCY DEPT VISIT HI MDM: CPT

## 2024-11-30 PROCEDURE — 2580000003 HC RX 258: Performed by: INTERNAL MEDICINE

## 2024-11-30 PROCEDURE — 94640 AIRWAY INHALATION TREATMENT: CPT

## 2024-11-30 PROCEDURE — 82803 BLOOD GASES ANY COMBINATION: CPT

## 2024-11-30 RX ORDER — MONTELUKAST SODIUM 10 MG/1
10 TABLET ORAL NIGHTLY
Qty: 30 TABLET | Refills: 3 | Status: SHIPPED | OUTPATIENT
Start: 2024-11-30

## 2024-11-30 RX ORDER — ALBUTEROL SULFATE 0.83 MG/ML
2.5 SOLUTION RESPIRATORY (INHALATION) ONCE
Status: COMPLETED | OUTPATIENT
Start: 2024-11-30 | End: 2024-11-30

## 2024-11-30 RX ORDER — ALBUTEROL SULFATE 0.83 MG/ML
5 SOLUTION RESPIRATORY (INHALATION) 2 TIMES DAILY
Status: DISCONTINUED | OUTPATIENT
Start: 2024-11-30 | End: 2024-11-30

## 2024-11-30 RX ORDER — PREDNISONE 10 MG/1
TABLET ORAL
Qty: 20 TABLET | Refills: 0 | Status: SHIPPED | OUTPATIENT
Start: 2024-11-30 | End: 2024-12-10

## 2024-11-30 RX ORDER — IPRATROPIUM BROMIDE AND ALBUTEROL SULFATE 2.5; .5 MG/3ML; MG/3ML
1 SOLUTION RESPIRATORY (INHALATION) ONCE
Status: COMPLETED | OUTPATIENT
Start: 2024-11-30 | End: 2024-11-30

## 2024-11-30 RX ADMIN — ALBUTEROL SULFATE 2.5 MG: 2.5 SOLUTION RESPIRATORY (INHALATION) at 19:50

## 2024-11-30 RX ADMIN — IPRATROPIUM BROMIDE AND ALBUTEROL SULFATE 1 DOSE: 2.5; .5 SOLUTION RESPIRATORY (INHALATION) at 18:51

## 2024-11-30 RX ADMIN — ALBUTEROL SULFATE 2.5 MG: 2.5 SOLUTION RESPIRATORY (INHALATION) at 19:44

## 2024-11-30 RX ADMIN — METHYLPREDNISOLONE SODIUM SUCCINATE 125 MG: 125 INJECTION INTRAMUSCULAR; INTRAVENOUS at 18:45

## 2024-11-30 ASSESSMENT — ENCOUNTER SYMPTOMS
BACK PAIN: 0
SHORTNESS OF BREATH: 1
SINUS PRESSURE: 0
DIARRHEA: 0
CHOKING: 0
SORE THROAT: 0
RHINORRHEA: 0
EYE DISCHARGE: 0
WHEEZING: 1
CHEST TIGHTNESS: 0
ABDOMINAL PAIN: 0
NAUSEA: 0
CONSTIPATION: 0
EYE ITCHING: 0
COLOR CHANGE: 0

## 2024-11-30 ASSESSMENT — LIFESTYLE VARIABLES
HOW MANY STANDARD DRINKS CONTAINING ALCOHOL DO YOU HAVE ON A TYPICAL DAY: PATIENT DOES NOT DRINK
HOW OFTEN DO YOU HAVE A DRINK CONTAINING ALCOHOL: NEVER

## 2024-11-30 NOTE — ED PROVIDER NOTES
[facility]  EMERGENCY DEPARTMENT ENCOUNTER          PHYSICIAN ASSISTANT NOTE       Date of evaluation: 11/30/2024    Chief Complaint     Shortness of Breath (Patient stated that she has had SOB and expiratory wheezing since last night, hx of asthma, tried her inhaler with no relief. )      History of Present Illness     Юлия Barahona is a 64 y.o. female who presents with a PMH of asthma, sleep apnea, GERD who presents to the emergency department with wheezing and shortness of breath.  Patient states that she has been having an asthma exacerbation over the past couple of days.  Patient uses an albuterol inhaler a couple of times per day however is not on any maintenance medications.  Patient has been, in contact with her  pulmonologist however it does not appear that she has an appointment scheduled.  Patient denies any increase in flulike symptoms, nasal congestion, chest pain, lower extremity swelling, recent travel or recent surgeries.    ASSESSMENT / PLAN  (MEDICAL DECISION MAKING)     INITIAL VITALS: BP: (!) 178/77,  , Pulse: 87, Respirations: 22, SpO2: 95 %    Юлия Barahona is a 64 y.o. female who presents with a PMH of asthma, sleep apnea, GERD who presents to the emergency department with wheezing and shortness of breath.  Patient states that she has been having an asthma exacerbation over the past couple of days.  Patient uses an albuterol inhaler a couple of times per day however is not on any maintenance medications.  Patient has been, in contact with her  pulmonologist however it does not appear that she has an appointment scheduled.  Patient denies any increase in flulike symptoms, nasal congestion, chest pain, lower extremity swelling, recent travel or recent surgeries.        On physical exam, patient is a 64-year-old female hemodynamically stable.  Patient's lungs have diffuse wheezing and poor aeration.  Initial workup included blood work CXR COVID flu.  Unremarkable workup.  Patient

## 2024-12-01 LAB
EKG ATRIAL RATE: 74 BPM
EKG DIAGNOSIS: NORMAL
EKG P-R INTERVAL: 136 MS
EKG Q-T INTERVAL: 372 MS
EKG QRS DURATION: 82 MS
EKG QTC CALCULATION (BAZETT): 412 MS
EKG R AXIS: -21 DEGREES
EKG T AXIS: -15 DEGREES
EKG VENTRICULAR RATE: 74 BPM

## 2024-12-01 NOTE — DISCHARGE INSTRUCTIONS
-You are seen in the emergency department due to an asthma exacerbation.  You were given breathing treatments which did improve your symptoms.  -I have prescribed you 2 medications, 1 is Singulair to take at night 1 tablet.  The other 1 is a prednisone burst, 40 mg once daily x 5 days.  -It is very important that you follow-up with your pulmonology doctor at .  Please make sure you call to clarify your appointment.  -Return to the ER with concerning symptoms such as fever, chills, chest pain, shortness of breath or other concerning symptoms

## 2024-12-01 NOTE — ED NOTES
Walking pulse oximetry was tested without any complication, patient stayed between 96-97%.       Kristan Lynn, RN  11/30/24 6233

## 2024-12-01 NOTE — ED PROVIDER NOTES
ED Attending Attestation Note     Date of evaluation: 2023    This patient was seen by the advance practice provider.  I have seen and examined the patient, agree with the workup, evaluation, management and diagnosis. The care plan has been discussed.  I have reviewed the ECG and concur with the VANESSA's interpretation.      Patient History     Chief Complaint: Shortness of Breath (Patient stated that she has had SOB and expiratory wheezing since last night, hx of asthma, tried her inhaler with no relief. )      HPI: Юлия Barahona is a 64 y.o. with a past medical history of asthma, although not having been on any maintenance inhalers for some time, as well as other medical comorbidities, who presents to the Emergency Department with complaints of increasing wheezing and shortness of breath over the past couple of days.  Denies any upper or lower respiratory infectious symptoms.    She has a past medical history of GERD (gastroesophageal reflux disease), Gestational diabetes, Hypertension, Morbid obesity due to excess calories, Severe persistent asthma, Vitamin D deficiency, and Xerosis cutis.    She has a past surgical history that includes  section ().    family history is not on file.    She reports that she has never smoked. She has never used smokeless tobacco. She reports that she does not currently use alcohol. She reports that she does not use drugs.    Pertinent Physical Exam Findings:   Generally well-appearing patient, mildly tachypneic, but able to speak in essentially complete sentences.  On exam, she has reasonable air entry, with diffuse expiratory wheezing throughout.  No focal crackles.          Jazmine Kerr MD  [phone]

## 2024-12-03 DIAGNOSIS — J98.01 COUGH DUE TO BRONCHOSPASM: ICD-10-CM

## 2024-12-03 RX ORDER — ALBUTEROL SULFATE 90 UG/1
2 INHALANT RESPIRATORY (INHALATION) EVERY 4 HOURS PRN
Qty: 18 EACH | Refills: 3 | Status: SHIPPED | OUTPATIENT
Start: 2024-12-03

## 2024-12-03 NOTE — TELEPHONE ENCOUNTER
Medication:   Requested Prescriptions     Pending Prescriptions Disp Refills    albuterol sulfate HFA (PROVENTIL;VENTOLIN;PROAIR) 108 (90 Base) MCG/ACT inhaler [Pharmacy Med Name: ALBUTEROL HFA (VENTOLIN) INH] 18 each 3     Sig: INHALE 2 PUFFS INTO THE LUNGS EVERY 4 HOURS AS NEEDED (COUGH)        Last Filled:      Patient Phone Number: [phone] (home)     Last appt: 7/3/2024   Next appt: 12/24/2024    Last OARRS:        No data to display

## 2024-12-13 ENCOUNTER — HOSPITAL ENCOUNTER (EMERGENCY)
Age: 64
Discharge: HOME | End: 2024-12-13
Attending: STUDENT IN AN ORGANIZED HEALTH CARE EDUCATION/TRAINING PROGRAM
Payer: COMMERCIAL

## 2024-12-13 ENCOUNTER — APPOINTMENT (OUTPATIENT)
Dept: GENERAL RADIOLOGY | Age: 64
End: 2024-12-13
Payer: COMMERCIAL

## 2024-12-13 VITALS
OXYGEN SATURATION: 98 % | HEIGHT: 65 IN | BODY MASS INDEX: 43 KG/M2 | TEMPERATURE: 98.7 F | DIASTOLIC BLOOD PRESSURE: 85 MMHG | SYSTOLIC BLOOD PRESSURE: 136 MMHG | RESPIRATION RATE: 15 BRPM | HEART RATE: 78 BPM

## 2024-12-13 DIAGNOSIS — S46.911A STRAIN OF RIGHT SHOULDER, INITIAL ENCOUNTER: Primary | ICD-10-CM

## 2024-12-13 DIAGNOSIS — Z12.31 ENCOUNTER FOR SCREENING MAMMOGRAM FOR HIGH-RISK PATIENT: ICD-10-CM

## 2024-12-13 PROCEDURE — 77067 SCR MAMMO BI INCL CAD: CPT

## 2024-12-13 PROCEDURE — 6370000000 HC RX 637 (ALT 250 FOR IP)

## 2024-12-13 PROCEDURE — 73060 X-RAY EXAM OF HUMERUS: CPT

## 2024-12-13 PROCEDURE — 6370000000 HC RX 637 (ALT 250 FOR IP): Performed by: STUDENT IN AN ORGANIZED HEALTH CARE EDUCATION/TRAINING PROGRAM

## 2024-12-13 PROCEDURE — 99283 EMERGENCY DEPT VISIT LOW MDM: CPT

## 2024-12-13 PROCEDURE — 73030 X-RAY EXAM OF SHOULDER: CPT

## 2024-12-13 RX ORDER — METHOCARBAMOL 750 MG/1
750 TABLET, FILM COATED ORAL 4 TIMES DAILY
Qty: 40 TABLET | Refills: 0 | Status: SHIPPED | OUTPATIENT
Start: 2024-12-13 | End: 2024-12-23

## 2024-12-13 RX ORDER — METHOCARBAMOL 500 MG/1
750 TABLET, FILM COATED ORAL 4 TIMES DAILY
Status: DISCONTINUED | OUTPATIENT
Start: 2024-12-13 | End: 2024-12-13

## 2024-12-13 RX ORDER — METHOCARBAMOL 500 MG/1
750 TABLET, FILM COATED ORAL 4 TIMES DAILY
Status: DISCONTINUED | OUTPATIENT
Start: 2024-12-13 | End: 2024-12-13 | Stop reason: HOSPADM

## 2024-12-13 RX ORDER — ACETAMINOPHEN 325 MG/1
650 TABLET ORAL ONCE
Status: COMPLETED | OUTPATIENT
Start: 2024-12-13 | End: 2024-12-13

## 2024-12-13 RX ADMIN — METHOCARBAMOL 750 MG: 500 TABLET ORAL at 18:08

## 2024-12-13 RX ADMIN — ACETAMINOPHEN 650 MG: 325 TABLET ORAL at 17:50

## 2024-12-13 ASSESSMENT — ENCOUNTER SYMPTOMS
DIARRHEA: 0
SHORTNESS OF BREATH: 0
WHEEZING: 0
CHEST TIGHTNESS: 0
BACK PAIN: 0
ABDOMINAL DISTENTION: 0
VOMITING: 0
NAUSEA: 0

## 2024-12-13 ASSESSMENT — PAIN - FUNCTIONAL ASSESSMENT: PAIN_FUNCTIONAL_ASSESSMENT: 0-10

## 2024-12-13 ASSESSMENT — PAIN SCALES - GENERAL: PAINLEVEL_OUTOF10: 8
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
## 2024-12-24 ENCOUNTER — OFFICE VISIT (OUTPATIENT)
Dept: PRIMARY CARE CLINIC | Age: 64
End: 2024-12-24
Payer: COMMERCIAL

## 2024-12-24 VITALS
TEMPERATURE: 97.5 F | HEIGHT: 65 IN | BODY MASS INDEX: 42.02 KG/M2 | HEART RATE: 72 BPM | OXYGEN SATURATION: 98 % | WEIGHT: 252.2 LBS | SYSTOLIC BLOOD PRESSURE: 135 MMHG | DIASTOLIC BLOOD PRESSURE: 75 MMHG

## 2024-12-24 DIAGNOSIS — Z12.4 SCREENING FOR CERVICAL CANCER: ICD-10-CM

## 2024-12-24 DIAGNOSIS — Z23 FLU VACCINE NEED: ICD-10-CM

## 2024-12-24 DIAGNOSIS — I10 ESSENTIAL HYPERTENSION: ICD-10-CM

## 2024-12-24 DIAGNOSIS — J34.89 RHINORRHEA: ICD-10-CM

## 2024-12-24 DIAGNOSIS — Z23 NEED FOR COVID-19 VACCINE: ICD-10-CM

## 2024-12-24 DIAGNOSIS — Z00.00 PE (PHYSICAL EXAM), ANNUAL: Primary | ICD-10-CM

## 2024-12-24 DIAGNOSIS — Z29.11 NEED FOR RSV VACCINATION: ICD-10-CM

## 2024-12-24 DIAGNOSIS — Z23 NEED FOR PNEUMOCOCCAL VACCINATION: ICD-10-CM

## 2024-12-24 DIAGNOSIS — M25.511 RIGHT SHOULDER PAIN, UNSPECIFIED CHRONICITY: ICD-10-CM

## 2024-12-24 DIAGNOSIS — M81.0 AGE-RELATED OSTEOPOROSIS WITHOUT CURRENT PATHOLOGICAL FRACTURE: ICD-10-CM

## 2024-12-24 PROCEDURE — 99396 PREV VISIT EST AGE 40-64: CPT | Performed by: INTERNAL MEDICINE

## 2024-12-24 PROCEDURE — 3078F DIAST BP <80 MM HG: CPT | Performed by: INTERNAL MEDICINE

## 2024-12-24 PROCEDURE — 3075F SYST BP GE 130 - 139MM HG: CPT | Performed by: INTERNAL MEDICINE

## 2024-12-24 RX ORDER — FEXOFENADINE HCL 180 MG/1
180 TABLET ORAL DAILY
Qty: 30 TABLET | Refills: 0 | Status: SHIPPED | OUTPATIENT
Start: 2024-12-24 | End: 2025-01-23

## 2024-12-24 RX ORDER — AMLODIPINE BESYLATE 5 MG/1
5 TABLET ORAL DAILY
Qty: 90 TABLET | Refills: 4 | Status: SHIPPED | OUTPATIENT
Start: 2024-12-24

## 2024-12-24 RX ORDER — CELECOXIB 200 MG/1
200 CAPSULE ORAL DAILY
Qty: 30 CAPSULE | Refills: 1 | Status: SHIPPED | OUTPATIENT
Start: 2024-12-24

## 2024-12-24 RX ORDER — CANDESARTAN 32 MG/1
32 TABLET ORAL DAILY
Qty: 90 TABLET | Refills: 4 | Status: SHIPPED | OUTPATIENT
Start: 2024-12-24

## 2024-12-24 RX ORDER — BUDESONIDE AND FORMOTEROL FUMARATE DIHYDRATE 80; 4.5 UG/1; UG/1
2 AEROSOL RESPIRATORY (INHALATION) 2 TIMES DAILY
COMMUNITY
Start: 2024-12-13

## 2024-12-24 ASSESSMENT — ENCOUNTER SYMPTOMS
ABDOMINAL PAIN: 0
CONSTIPATION: 0
ABDOMINAL DISTENTION: 0
SHORTNESS OF BREATH: 0
DIARRHEA: 0
CHEST TIGHTNESS: 0
EYES NEGATIVE: 1
GASTROINTESTINAL NEGATIVE: 1
WHEEZING: 0
COUGH: 0
RHINORRHEA: 1
BLOOD IN STOOL: 0
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
## 2024-12-27 DIAGNOSIS — Z00.00 PE (PHYSICAL EXAM), ANNUAL: ICD-10-CM

## 2024-12-27 LAB
BACTERIA URNS QL MICRO: NORMAL /HPF
BILIRUB UR QL STRIP.AUTO: NEGATIVE
CHOLEST SERPL-MCNC: 231 MG/DL (ref 0–199)
CLARITY UR: CLEAR
COLOR UR: YELLOW
EPI CELLS #/AREA URNS AUTO: 5 /HPF (ref 0–5)
GLUCOSE UR STRIP.AUTO-MCNC: NEGATIVE MG/DL
HDLC SERPL-MCNC: 65 MG/DL (ref 40–60)
HGB UR QL STRIP.AUTO: NEGATIVE
HYALINE CASTS #/AREA URNS AUTO: 0 /LPF (ref 0–8)
KETONES UR STRIP.AUTO-MCNC: NEGATIVE MG/DL
LDLC SERPL CALC-MCNC: 151 MG/DL
LEUKOCYTE ESTERASE UR QL STRIP.AUTO: NEGATIVE
NITRITE UR QL STRIP.AUTO: NEGATIVE
PH UR STRIP.AUTO: 8 [PH] (ref 5–8)
PROT UR STRIP.AUTO-MCNC: ABNORMAL MG/DL
RBC CLUMPS #/AREA URNS AUTO: 1 /HPF (ref 0–4)
SP GR UR STRIP.AUTO: 1.02 (ref 1–1.03)
TRIGL SERPL-MCNC: 75 MG/DL (ref 0–150)
TSH SERPL DL<=0.005 MIU/L-ACNC: 1.46 UIU/ML (ref 0.27–4.2)
UA DIPSTICK W REFLEX MICRO PNL UR: YES
URN SPEC COLLECT METH UR: ABNORMAL
UROBILINOGEN UR STRIP-ACNC: 1 E.U./DL
VLDLC SERPL CALC-MCNC: 15 MG/DL
WBC #/AREA URNS AUTO: 0 /HPF (ref 0–5)

## 2024-12-28 LAB
EST. AVERAGE GLUCOSE BLD GHB EST-MCNC: 108.3 MG/DL
HBA1C MFR BLD: 5.4 %

## 2025-01-22 DIAGNOSIS — J34.89 RHINORRHEA: ICD-10-CM

## 2025-01-23 NOTE — TELEPHONE ENCOUNTER
Medication:   Requested Prescriptions     Pending Prescriptions Disp Refills    fexofenadine (ALLEGRA) 180 MG tablet [Pharmacy Med Name: FEXOFENADINE  MG TABLET] 30 tablet 0     Sig: TAKE 1 TABLET BY MOUTH EVERY DAY        Last Filled:      Patient Phone Number: [phone] (home)     Last appt: 12/24/2024   Next appt: Visit date not found    Last OARRS:        No data to display

## 2025-01-24 RX ORDER — FEXOFENADINE HCL 180 MG/1
180 TABLET ORAL DAILY
Qty: 30 TABLET | Refills: 0 | Status: SHIPPED | OUTPATIENT
Start: 2025-01-24

## 2025-04-16 ENCOUNTER — HOSPITAL ENCOUNTER (EMERGENCY)
Age: 65
Discharge: HOME | End: 2025-04-16
Attending: EMERGENCY MEDICINE
Payer: COMMERCIAL

## 2025-04-16 ENCOUNTER — APPOINTMENT (OUTPATIENT)
Dept: GENERAL RADIOLOGY | Age: 65
End: 2025-04-16
Payer: COMMERCIAL

## 2025-04-16 VITALS
OXYGEN SATURATION: 99 % | RESPIRATION RATE: 18 BRPM | BODY MASS INDEX: 42.48 KG/M2 | DIASTOLIC BLOOD PRESSURE: 78 MMHG | TEMPERATURE: 98.1 F | HEART RATE: 60 BPM | WEIGHT: 255.29 LBS | SYSTOLIC BLOOD PRESSURE: 169 MMHG

## 2025-04-16 DIAGNOSIS — M79.604 RIGHT LEG PAIN: Primary | ICD-10-CM

## 2025-04-16 PROCEDURE — 99283 EMERGENCY DEPT VISIT LOW MDM: CPT

## 2025-04-16 PROCEDURE — 73552 X-RAY EXAM OF FEMUR 2/>: CPT

## 2025-04-16 PROCEDURE — 6370000000 HC RX 637 (ALT 250 FOR IP): Performed by: EMERGENCY MEDICINE

## 2025-04-16 RX ORDER — ACETAMINOPHEN 500 MG
500 TABLET ORAL ONCE
Status: COMPLETED | OUTPATIENT
Start: 2025-04-16 | End: 2025-04-16

## 2025-04-16 RX ORDER — IBUPROFEN 600 MG/1
600 TABLET, FILM COATED ORAL EVERY 6 HOURS PRN
Qty: 30 TABLET | Refills: 0 | Status: SHIPPED | OUTPATIENT
Start: 2025-04-16

## 2025-04-16 RX ORDER — METHOCARBAMOL 750 MG/1
750 TABLET, FILM COATED ORAL 4 TIMES DAILY
Qty: 40 TABLET | Refills: 0 | Status: SHIPPED | OUTPATIENT
Start: 2025-04-16 | End: 2025-04-26

## 2025-04-16 RX ORDER — IBUPROFEN 400 MG/1
800 TABLET, FILM COATED ORAL ONCE
Status: COMPLETED | OUTPATIENT
Start: 2025-04-16 | End: 2025-04-16

## 2025-04-16 RX ADMIN — ACETAMINOPHEN 500 MG: 500 TABLET ORAL at 21:06

## 2025-04-16 RX ADMIN — IBUPROFEN 800 MG: 400 TABLET, FILM COATED ORAL at 21:06

## 2025-04-16 ASSESSMENT — PAIN SCALES - GENERAL
PAINLEVEL_OUTOF10: 10
PAINLEVEL_OUTOF10: 10

## 2025-04-16 ASSESSMENT — PAIN DESCRIPTION - ORIENTATION: ORIENTATION: RIGHT

## 2025-04-16 ASSESSMENT — PAIN DESCRIPTION - LOCATION: LOCATION: KNEE

## 2025-04-16 ASSESSMENT — PAIN DESCRIPTION - DESCRIPTORS: DESCRIPTORS: ACHING

## 2025-04-17 ENCOUNTER — OFFICE VISIT (OUTPATIENT)
Dept: ORTHOPEDIC SURGERY | Age: 65
End: 2025-04-17

## 2025-04-17 VITALS — HEIGHT: 65 IN | BODY MASS INDEX: 42.48 KG/M2

## 2025-04-17 DIAGNOSIS — M25.561 ACUTE PAIN OF RIGHT KNEE: ICD-10-CM

## 2025-04-17 DIAGNOSIS — S70.11XA CONTUSION OF RIGHT THIGH, INITIAL ENCOUNTER: Primary | ICD-10-CM

## 2025-04-17 DIAGNOSIS — K21.9 GASTROESOPHAGEAL REFLUX DISEASE WITHOUT ESOPHAGITIS: ICD-10-CM

## 2025-04-17 DIAGNOSIS — M17.11 PRIMARY OSTEOARTHRITIS OF RIGHT KNEE: ICD-10-CM

## 2025-04-17 DIAGNOSIS — J34.89 RHINORRHEA: ICD-10-CM

## 2025-04-17 RX ORDER — LIDOCAINE HYDROCHLORIDE 10 MG/ML
2 INJECTION, SOLUTION INFILTRATION; PERINEURAL ONCE
Status: COMPLETED | OUTPATIENT
Start: 2025-04-17 | End: 2025-04-17

## 2025-04-17 RX ORDER — FEXOFENADINE HCL 180 MG/1
180 TABLET ORAL DAILY
Qty: 30 TABLET | Refills: 0 | Status: SHIPPED | OUTPATIENT
Start: 2025-04-17

## 2025-04-17 RX ORDER — PANTOPRAZOLE SODIUM 40 MG/1
40 TABLET, DELAYED RELEASE ORAL
Qty: 90 TABLET | Refills: 1 | Status: SHIPPED | OUTPATIENT
Start: 2025-04-17

## 2025-04-17 RX ADMIN — LIDOCAINE HYDROCHLORIDE 2 ML: 10 INJECTION, SOLUTION INFILTRATION; PERINEURAL at 11:19

## 2025-04-17 NOTE — TELEPHONE ENCOUNTER
Please advise Dr Dela Cruz out of the office    Medication:   Requested Prescriptions     Pending Prescriptions Disp Refills    fexofenadine (ALLEGRA) 180 MG tablet [Pharmacy Med Name: FEXOFENADINE  MG TABLET] 30 tablet 0     Sig: TAKE 1 TABLET BY MOUTH EVERY DAY    pantoprazole (PROTONIX) 40 MG tablet [Pharmacy Med Name: PANTOPRAZOLE SOD DR 40 MG TAB] 90 tablet 1     Sig: TAKE 1 TABLET BY MOUTH EVERY DAY BEFORE BREAKFAST        Last Filled:      Patient Phone Number: [phone] (home)     Last appt: 12/24/2024   Next appt: Visit date not found    Last OARRS:        No data to display

## 2025-04-17 NOTE — DISCHARGE INSTRUCTIONS
Return to emergency department for worsening symptoms or other concerns.  Follow-up with your primary care doctor in 2 to 3 days for recheck.

## 2025-04-17 NOTE — ED PROVIDER NOTES
Medications    ALBUTEROL SULFATE HFA (PROVENTIL;VENTOLIN;PROAIR) 108 (90 BASE) MCG/ACT INHALER    INHALE 2 PUFFS INTO THE LUNGS EVERY 4 HOURS AS NEEDED (COUGH)    AMLODIPINE (NORVASC) 5 MG TABLET    Take 1 tablet by mouth daily    AZELAIC ACID 15 % GEL    APPLY TOPICALLY ONCE DAILY FOR ACNE    BUDESONIDE-FORMOTEROL (SYMBICORT) 80-4.5 MCG/ACT AERO    Inhale 2 puffs into the lungs 2 times daily    CALTRATE 600+D PLUS MINERALS (CALTRATE) 600-800 MG-UNIT TABS TABLET    TAKE 1 TABLET BY MOUTH 2 TIMES DAILY. IF STILL OOS IN 1 WEEK, REQUEST A DIFFERENT STRENGTH FROM DOC    CANDESARTAN (ATACAND) 32 MG TABLET    Take 1 tablet by mouth daily    CELECOXIB (CELEBREX) 200 MG CAPSULE    Take 1 capsule by mouth daily    DICLOFENAC SODIUM (VOLTAREN) 1 % GEL    APPLY 4 GRAMS TOPICALLY 4 TIMES A DAY    FEXOFENADINE (ALLEGRA) 180 MG TABLET    TAKE 1 TABLET BY MOUTH EVERY DAY    PANTOPRAZOLE (PROTONIX) 40 MG TABLET    TAKE 1 TABLET BY MOUTH EVERY DAY BEFORE BREAKFAST    SPACER/AERO-HOLDING CHAMBERS FABIO    1 Device by Does not apply route daily as needed (when using albuterol MDI)       Allergies     She is allergic to chloroquine, lisinopril, mefloquine, and quinolones.    Physical Exam     INITIAL VITALS: BP: (!) 169/78, Temp: 98.1 °F (36.7 °C), Pulse: 60, Respirations: 18, SpO2: 99 %   Physical Exam  Constitutional:       Appearance: Normal appearance.   HENT:      Head: Normocephalic and atraumatic.   Cardiovascular:      Rate and Rhythm: Normal rate and regular rhythm.   Pulmonary:      Effort: Pulmonary effort is normal.   Abdominal:      General: Abdomen is flat.      Palpations: Abdomen is soft.   Musculoskeletal:      Cervical back: Normal range of motion.      Comments: Tenderness to palpation in the anterior over the lateral right thigh approximately the midline.  No gross deformities appreciated.  No swelling is appreciated.  Full range of motion at the knee, able to keep the leg elevated at the knee against resistance,

## 2025-04-22 NOTE — PROGRESS NOTES
exhibits moderate complexity for obtaining an independent history, reviewing past medical records, independent interpretation/review of diagnostic imaging, and further coordinating care.  The patient exhibits low risk given that the patient is being treated with conservative therapy.  Approximately 30 minutes were spent reviewing past medical records, imaging, educating the patient, and coordinating care.            Yahir Witt PA-C, AT    Physician Assistant - Certified  Orthopedic Surgery   [facility]    04/22/25 9:09 AM

## 2025-05-06 ENCOUNTER — OFFICE VISIT (OUTPATIENT)
Dept: ORTHOPEDIC SURGERY | Age: 65
End: 2025-05-06
Payer: COMMERCIAL

## 2025-05-06 DIAGNOSIS — M79.604 PAIN OF RIGHT LOWER EXTREMITY: Primary | ICD-10-CM

## 2025-05-06 DIAGNOSIS — I10 ESSENTIAL HYPERTENSION: ICD-10-CM

## 2025-05-06 DIAGNOSIS — M51.361 DEGENERATION OF INTERVERTEBRAL DISC OF LUMBAR REGION WITH LOWER EXTREMITY PAIN: ICD-10-CM

## 2025-05-06 DIAGNOSIS — G90.521 COMPLEX REGIONAL PAIN SYNDROME TYPE 1 OF RIGHT LOWER EXTREMITY: ICD-10-CM

## 2025-05-06 DIAGNOSIS — M43.16 SPONDYLOLISTHESIS AT L4-L5 LEVEL: ICD-10-CM

## 2025-05-06 DIAGNOSIS — R20.2 PARESTHESIAS: ICD-10-CM

## 2025-05-06 DIAGNOSIS — J34.89 RHINORRHEA: ICD-10-CM

## 2025-05-06 PROCEDURE — 99214 OFFICE O/P EST MOD 30 MIN: CPT | Performed by: PHYSICIAN ASSISTANT

## 2025-05-06 RX ORDER — GABAPENTIN 300 MG/1
300 CAPSULE ORAL 2 TIMES DAILY
Qty: 28 CAPSULE | Refills: 0 | Status: SHIPPED | OUTPATIENT
Start: 2025-05-06 | End: 2025-05-20

## 2025-05-06 NOTE — PROGRESS NOTES
Referring Provider:  No ref. provider found    CHIEF COMPLAINT:    Chief Complaint   Patient presents with    Follow-up     Leg pain (thigh pain)       HISTORY OF PRESENT ILLNESS:    Ms. Юлия Barahona  is a pleasant 64 y.o. -American female here for consultation regarding her right leg pain.  To recap, this patient is well-known to [facility] and is being treated with conservative therapy for the arthritis in both of her knees.  This conservative treatment includes: rest, ice, elevation, home exercises, activity modification, NSAIDs as needed, corticosteroid injections and visco supplementation injections.       The patient presented on 04-17-25 due to new pain that she is experiencing over the lateral aspect of the right thigh.  She had this pain in the past which resolved with the use of topical Voltaren.  However, the patient attests that she struck an ironing board on that side on 04/14/2025 and had pain that she described as \"sizzling\" sensation in the lateral thigh that was so intense she went to the emergency department.  Workup was unremarkable and she was discharged.  She has been utilizing methocarbamol and ibuprofen for this pain.  She was also prescribed a combo cream that contained gabapentin, Toradol and lidocaine which she states has not helped her symptoms.  She presents today as they are getting worse.    The patient attest to sharp, \"sizzling\" sensations over the lateral aspect of the thigh.  She denies any pain in the low back, the groin or the knee at this time.  Symptoms are worse with any palpation.  She denies that symptoms are exacerbated with any movement of the back or lower extremities.  She also denies any saddle anesthesia, change in her bowel or bladder habits and no lower extremity weakness.        Current/Past Treatment:   Physical Therapy: None  Chiropractic: None  Injection: None  Medications:    NSAIDS:  Voltaren orally and topically  Muscle relaxer:

## 2025-05-07 RX ORDER — FEXOFENADINE HCL 180 MG/1
180 TABLET ORAL DAILY
Qty: 30 TABLET | Refills: 0 | Status: SHIPPED | OUTPATIENT
Start: 2025-05-07

## 2025-05-07 RX ORDER — CANDESARTAN 32 MG/1
32 TABLET ORAL DAILY
Qty: 90 TABLET | Refills: 4 | Status: SHIPPED | OUTPATIENT
Start: 2025-05-07

## 2025-05-07 NOTE — TELEPHONE ENCOUNTER
Medication:   Requested Prescriptions     Pending Prescriptions Disp Refills    fexofenadine (ALLEGRA) 180 MG tablet [Pharmacy Med Name: FEXOFENADINE  MG TABLET] 30 tablet 0     Sig: TAKE 1 TABLET BY MOUTH EVERY DAY        Last Filled:      Patient Phone Number: [phone] (home)     Last appt: 12/24/2024   Next appt: 5/6/2025    Last OARRS:        No data to display

## 2025-05-07 NOTE — TELEPHONE ENCOUNTER
Medication:   Requested Prescriptions     Pending Prescriptions Disp Refills    candesartan (ATACAND) 32 MG tablet [Pharmacy Med Name: CANDESARTAN CILEXETIL 32 MG TB] 90 tablet 4     Sig: TAKE 1 TABLET BY MOUTH EVERY DAY        Last Filled:      Patient Phone Number: [phone] (home)     Last appt: 12/24/2024   Next appt: Visit date not found    Last OARRS:        No data to display

## 2025-05-08 ENCOUNTER — TELEPHONE (OUTPATIENT)
Dept: ORTHOPEDIC SURGERY | Age: 65
End: 2025-05-08

## 2025-05-08 NOTE — TELEPHONE ENCOUNTER
----- Message from Lindy GUNN sent at 5/7/2025  3:44 PM EDT -----  Regarding: Specialty Message to Provider  Specialty Message to Provider    Relationship to Patient: Self     Patient Message: Pt HAD A MESSAGE FROM INSURANCE THAT MRI IS APPROVED. IS Pt OK TO SCHEDULE NOW? PLEASE CALL TO ADVISE.    --------------------------------------------------------------------------------------------------------------------------    Call Back Information: OK to leave message on voicemail  Preferred Call Back Number:  [phone]

## 2025-05-09 DIAGNOSIS — M25.511 RIGHT SHOULDER PAIN, UNSPECIFIED CHRONICITY: ICD-10-CM

## 2025-05-09 RX ORDER — CELECOXIB 200 MG/1
CAPSULE ORAL DAILY
Qty: 30 CAPSULE | Refills: 1 | Status: SHIPPED | OUTPATIENT
Start: 2025-05-09

## 2025-05-09 NOTE — TELEPHONE ENCOUNTER
Medication:   Requested Prescriptions     Pending Prescriptions Disp Refills    celecoxib (CELEBREX) 200 MG capsule [Pharmacy Med Name: CELECOXIB 200 MG CAPSULE] 30 capsule 1     Sig: TAKE 1 CAPSULE BY MOUTH EVERY DAY        Last Filled:      Patient Phone Number: [phone] (home)     Last appt: 12/24/2024   Next appt: Visit date not found    Last OARRS:        No data to display

## 2025-05-10 ENCOUNTER — HOSPITAL ENCOUNTER (OUTPATIENT)
Dept: MRI IMAGING | Age: 65
Discharge: HOME | End: 2025-05-10
Payer: COMMERCIAL

## 2025-05-10 DIAGNOSIS — M79.604 PAIN OF RIGHT LOWER EXTREMITY: ICD-10-CM

## 2025-05-10 DIAGNOSIS — R20.2 PARESTHESIAS: ICD-10-CM

## 2025-05-10 PROCEDURE — 72148 MRI LUMBAR SPINE W/O DYE: CPT

## 2025-05-20 ENCOUNTER — OFFICE VISIT (OUTPATIENT)
Dept: ORTHOPEDIC SURGERY | Age: 65
End: 2025-05-20

## 2025-05-20 DIAGNOSIS — R20.2 PARESTHESIAS: ICD-10-CM

## 2025-05-20 DIAGNOSIS — M43.16 SPONDYLOLISTHESIS AT L4-L5 LEVEL: ICD-10-CM

## 2025-05-20 DIAGNOSIS — M51.361 DEGENERATION OF INTERVERTEBRAL DISC OF LUMBAR REGION WITH LOWER EXTREMITY PAIN: Primary | ICD-10-CM

## 2025-05-20 RX ORDER — PREGABALIN 50 MG/1
50 CAPSULE ORAL 2 TIMES DAILY
Qty: 60 CAPSULE | Refills: 0 | Status: SHIPPED | OUTPATIENT
Start: 2025-05-20 | End: 2025-06-19

## 2025-05-20 NOTE — PROGRESS NOTES
CHIEF COMPLAINT:    Chief Complaint   Patient presents with    Follow-up     Mri results       HISTORY OF PRESENT ILLNESS:    Ms. Юлия Barahona  is a pleasant 64 y.o. -American female here for consultation regarding her right leg pain and review of her MRI results.  To recap, this patient is well-known to [facility] and is being treated with conservative therapy for the arthritis in both of her knees.  This conservative treatment includes: rest, ice, elevation, home exercises, activity modification, NSAIDs as needed, corticosteroid injections and visco supplementation injections.       The patient presented on 04-17-25 due to new pain that she is experiencing over the lateral aspect of the right thigh.  She had this pain in the past which resolved with the use of topical Voltaren.  However, the patient attests that she struck an ironing board on that side on 04/14/2025 and had pain that she described as \"sizzling\" sensation in the lateral thigh that was so intense she went to the emergency department.  Workup was unremarkable and she was discharged.  She has been utilizing methocarbamol and ibuprofen for this pain.  She was also prescribed a combo cream that contained gabapentin, Toradol and lidocaine which she states has not helped her symptoms.  She presents today as they are getting worse.    The patient attest to sharp, \"sizzling\" sensations over the lateral aspect of the thigh.  She denies any pain in the low back, the groin or the knee at this time.  Symptoms are worse with any palpation.  She denies that symptoms are exacerbated with any movement of the back or lower extremities.  She also denies any saddle anesthesia, change in her bowel or bladder habits and no lower extremity weakness.    She states that her symptoms have not changed since her last visit.  However, she does get some mild relief from gabapentin though she states it makes her drowsy.        Current/Past

## 2025-05-28 ENCOUNTER — OFFICE VISIT (OUTPATIENT)
Dept: PRIMARY CARE CLINIC | Age: 65
End: 2025-05-28
Payer: COMMERCIAL

## 2025-05-28 VITALS
SYSTOLIC BLOOD PRESSURE: 142 MMHG | BODY MASS INDEX: 43.25 KG/M2 | HEART RATE: 80 BPM | DIASTOLIC BLOOD PRESSURE: 76 MMHG | TEMPERATURE: 97.6 F | OXYGEN SATURATION: 98 % | HEIGHT: 65 IN | WEIGHT: 259.6 LBS

## 2025-05-28 DIAGNOSIS — I10 ESSENTIAL HYPERTENSION: ICD-10-CM

## 2025-05-28 DIAGNOSIS — M48.061 SPINAL STENOSIS OF LUMBAR REGION, UNSPECIFIED WHETHER NEUROGENIC CLAUDICATION PRESENT: ICD-10-CM

## 2025-05-28 DIAGNOSIS — M79.651 PAIN OF RIGHT THIGH: Primary | ICD-10-CM

## 2025-05-28 DIAGNOSIS — R63.8 WEIGHT DISORDER: ICD-10-CM

## 2025-05-28 DIAGNOSIS — R73.03 PREDIABETES: ICD-10-CM

## 2025-05-28 PROCEDURE — 3077F SYST BP >= 140 MM HG: CPT | Performed by: INTERNAL MEDICINE

## 2025-05-28 PROCEDURE — 99214 OFFICE O/P EST MOD 30 MIN: CPT | Performed by: INTERNAL MEDICINE

## 2025-05-28 PROCEDURE — 3078F DIAST BP <80 MM HG: CPT | Performed by: INTERNAL MEDICINE

## 2025-05-28 SDOH — ECONOMIC STABILITY: FOOD INSECURITY: WITHIN THE PAST 12 MONTHS, YOU WORRIED THAT YOUR FOOD WOULD RUN OUT BEFORE YOU GOT MONEY TO BUY MORE.: NEVER TRUE

## 2025-05-28 SDOH — ECONOMIC STABILITY: FOOD INSECURITY: WITHIN THE PAST 12 MONTHS, THE FOOD YOU BOUGHT JUST DIDN'T LAST AND YOU DIDN'T HAVE MONEY TO GET MORE.: NEVER TRUE

## 2025-05-28 ASSESSMENT — PATIENT HEALTH QUESTIONNAIRE - PHQ9
SUM OF ALL RESPONSES TO PHQ QUESTIONS 1-9: 0
2. FEELING DOWN, DEPRESSED OR HOPELESS: NOT AT ALL
SUM OF ALL RESPONSES TO PHQ QUESTIONS 1-9: 0
SUM OF ALL RESPONSES TO PHQ QUESTIONS 1-9: 0
1. LITTLE INTEREST OR PLEASURE IN DOING THINGS: NOT AT ALL
SUM OF ALL RESPONSES TO PHQ QUESTIONS 1-9: 0

## 2025-05-28 ASSESSMENT — ENCOUNTER SYMPTOMS
ABDOMINAL PAIN: 0
CHEST TIGHTNESS: 0
SHORTNESS OF BREATH: 0
RHINORRHEA: 1
CONSTIPATION: 0
DIARRHEA: 0
WHEEZING: 0
GASTROINTESTINAL NEGATIVE: 1
BLOOD IN STOOL: 0
ABDOMINAL DISTENTION: 0
EYES NEGATIVE: 1
COUGH: 0

## 2025-05-28 NOTE — PROGRESS NOTES
Subjective:      Patient ID: Юлия Barahona is a 64 y.o. female.      5/28/2025  Patient presents with:  Leg Pain: Right thigh severe pain  Referral - General: Need referral for the [facility] for epidural injections- Dr Perez Garcia    MRI  done 5/11/25 confirms significant lumbar disease . To see Spine centre / Neurosurgery for further w/u          12/24/2024  Patient presents with:  Annual Exam                    7/3/2024  Patient presents with:  1 Month Follow-Up: Arm pain- feeling better    Cough bothersome! Occurs if temp too hot or too cold . Certain smells trigger it              4/19/2024  Patient presents with:  Arm Pain: Pain under armpit                 12/22/2023 9/29/2023 Patient presents with:  Numbness: In toes and feet with cramping  Arm Pain: Left arm pain when raising arm               6/13/2023  Patient presents with:  Follow-up: 1 week                 6/6/2023 Patient presents with:  Hypertension  does not like labetalol !causes freq of urination !   Takes it only once daily   Cough  ch annoying . States has had complete ENT evaluation reportedly nl   Spasms: got muscle relaxant from Urgent care               Last seen  VV   6/1/2023  Patient presents with:  Hypertension:               VV 4/4/2023 Patient presents with:  Medication Check: Pt she would like to change her Amlodipine to something else, pt states she is constantly going to the bathroom    Frequency of urination worse with Norvasc !               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     Her prior

## 2025-05-29 ENCOUNTER — TELEPHONE (OUTPATIENT)
Dept: ADMINISTRATIVE | Age: 65
End: 2025-05-29

## 2025-05-29 NOTE — TELEPHONE ENCOUNTER
Submitted PA for Mounjaro 2.5MG/0.5ML auto-injectors  Via [facility]  (Key: [id number]) STATUS: PENDING.    Follow up done daily; if no decision with in three days we will refax.  If another three days goes by with no decision will call the insurance for status.

## 2025-06-22 DIAGNOSIS — R20.2 PARESTHESIAS: ICD-10-CM

## 2025-06-23 RX ORDER — PREGABALIN 50 MG/1
50 CAPSULE ORAL 2 TIMES DAILY
Qty: 60 CAPSULE | Refills: 0 | Status: SHIPPED | OUTPATIENT
Start: 2025-06-23 | End: 2025-07-23

## 2025-06-27 NOTE — TELEPHONE ENCOUNTER
"History Of Present Illness  Fani Garcia is a 52 y.o. female presenting with low back pain here today for medial branch block.     Past Medical History  Medical History[1]    Surgical History  Surgical History[2]     Social History  She reports that she has never smoked. She has never used smokeless tobacco. She reports that she does not drink alcohol and does not use drugs.    Family History  Family History[3]     Allergies  Lisinopril and Meperidine    Review of Systems   Constitutional: Negative.    HENT: Negative.     Eyes: Negative.    Respiratory: Negative.     Cardiovascular: Negative.    Gastrointestinal: Negative.    Endocrine: Negative.    Genitourinary: Negative.    Musculoskeletal:  Positive for back pain.   Skin: Negative.    Allergic/Immunologic: Negative.    Neurological: Negative.    Hematological: Negative.    Psychiatric/Behavioral: Negative.        Physical Exam  Vitals and nursing note reviewed.   Constitutional:       Appearance: Normal appearance.   HENT:      Head: Normocephalic.   Cardiovascular:      Rate and Rhythm: Normal rate.   Pulmonary:      Effort: Pulmonary effort is normal.   Neurological:      Mental Status: She is alert.      Last Recorded Vitals  Blood pressure 107/90, pulse 98, temperature 35.9 °C (96.6 °F), temperature source Temporal, resp. rate 16, height 1.626 m (5' 4\"), weight 86.6 kg (191 lb), SpO2 100%.    Relevant Results             Assessment & Plan  Lumbosacral spondylosis without myelopathy      Today for bilateral L5-S1 medial branch block under fluoroscopic guidance.  Risks, benefit, and alternatives of the procedure were discussed with the patient.  Oswestry score has been compelted and recorded.    I spent 5 minutes in the professional and overall care of this patient.      Leander Pompa MD           [1]  Past Medical History:  Diagnosis Date   • Acetabular labrum tear 10/05/2023   • Acute pharyngitis, unspecified 03/19/2014    Sorethroat   • Acute vaginitis " Medication:   Requested Prescriptions     Pending Prescriptions Disp Refills    candesartan (ATACAND) 32 MG tablet [Pharmacy Med Name: CANDESARTAN CILEXETIL 32 MG TB] 30 tablet 3     Sig: Take 1 tablet by mouth daily        Last Filled:      Patient Phone Number: [phone] (home) [phone] (work)    Last appt: 3/26/2021   Next appt: Visit date not found    Last OARRS: No flowsheet data found. 01/15/2015    Bacterial vaginosis   • Benign essential hypertension 10/05/2023   • Encounter for gynecological examination (general) (routine) without abnormal findings 12/11/2020    Encounter for gynecological examination   • Myopia, unspecified eye 11/11/2016    Myopia with astigmatism and presbyopia   • Nystagmus    • Obesity, unspecified 09/02/2020    Obesity (BMI 30-39.9)   • Obstructive sleep apnea (adult) (pediatric) 10/02/2015    Obstructive sleep apnea   • Other pulmonary embolism without acute cor pulmonale 07/20/2013    Pulmonary embolism   • Otitis media, unspecified, left ear 03/19/2014    Acute left otitis media   • Personal history of other diseases of the circulatory system     History of hypertension   • Personal history of other diseases of the digestive system 03/26/2019    History of gastroesophageal reflux (GERD)   • Personal history of other diseases of the musculoskeletal system and connective tissue 07/24/2020    History of fibromyalgia   • Personal history of other diseases of the musculoskeletal system and connective tissue 01/20/2020    History of muscle weakness   • Personal history of other diseases of the nervous system and sense organs 04/28/2014    History of earache   • Personal history of other diseases of the respiratory system 03/25/2015    History of influenza   • Personal history of other diseases of the respiratory system 03/19/2014    History of streptococcal pharyngitis   • Personal history of other diseases of the respiratory system 07/22/2013    History of acute pharyngitis   • Personal history of other endocrine, nutritional and metabolic disease 08/12/2021    History of morbid obesity   • Personal history of other endocrine, nutritional and metabolic disease 12/04/2020    History of morbid obesity   • Personal history of other infectious and parasitic diseases     History of herpes simplex infection   • Personal history of other specified conditions 07/30/2020    History  of palpitations   • Personal history of other specified conditions 2019    History of dizziness   • Personal history of other specified conditions 2020    History of fatigue   • Plantar fascial fibromatosis 2015    Plantar fasciitis of left foot   • Pulmonary embolism    • Rash and other nonspecific skin eruption 2020    Rash of neck   • Shortness of breath 2020    SOB (shortness of breath) on exertion   [2]  Past Surgical History:  Procedure Laterality Date   • BREAST BIOPSY Left 2008    lt [id number]   •  SECTION, CLASSIC  10/02/2015     Section   • DILATION AND CURETTAGE OF UTERUS  2013    Dilation And Curettage   • LAPAROSCOPY DIAGNOSTIC / BIOPSY / ASPIRATION / LYSIS  2013    Exploratory Laparoscopy   [3]  Family History  Problem Relation Name Age of Onset   • Other (Anxiety) Mother Alisson Austin    • Other (Cardiac disorder) Mother Alisson Austin    • Depression Mother Alisson Austin    • Hyperlipidemia Mother Alisson Austin    • Hypertension Mother Alisson Austin    • Heart attack Mother Alisson Austin    • Osteoporosis Mother Alisson Austin    • COPD Mother Alisson Austin    • Heart disease Mother Alisson Austin    • ALS Father     • Colon cancer Brother Sathya Goetzlli 40 - 49   • Diabetes Brother Sathya Austin    • Hyperlipidemia Brother Sathya Austin    • Hypertension Brother Sathya Austin    • Cancer Brother Sathya Austin    • Depression Brother Sathya Austin    • Heart disease Brother Sathya Austin    • Kidney disease Brother Sathya Austin    • Other (Cardiac disorder) Brother Sathya Goetzlli 50 - 59   • Diabetes Maternal Grandmother     • Ovarian cancer Maternal Grandmother     • Diabetes Maternal Grandfather     • Liver cancer Maternal Grandfather     • Diabetes Paternal Grandmother     • Diabetes Paternal Grandfather     • Hypertension Brother Mayco Austin    • Heart disease Brother Mayco Austin    •  Hyperlipidemia Brother Mayco Austin    • Hypertension Brother Tereso Austin    • Hyperlipidemia Brother Tereso Austin

## 2025-07-20 DIAGNOSIS — M25.511 RIGHT SHOULDER PAIN, UNSPECIFIED CHRONICITY: ICD-10-CM

## 2025-07-21 RX ORDER — CELECOXIB 200 MG/1
CAPSULE ORAL DAILY
Qty: 30 CAPSULE | Refills: 1 | Status: SHIPPED | OUTPATIENT
Start: 2025-07-21

## 2025-07-21 NOTE — TELEPHONE ENCOUNTER
Medication:   Requested Prescriptions     Pending Prescriptions Disp Refills    celecoxib (CELEBREX) 200 MG capsule [Pharmacy Med Name: CELECOXIB 200 MG CAPSULE] 30 capsule 1     Sig: TAKE 1 CAPSULE BY MOUTH EVERY DAY        Last Filled:      Patient Phone Number: [phone] (home)     Last appt: 5/28/2025   Next appt: Visit date not found    Last OARRS:        No data to display